# Patient Record
Sex: MALE | Race: WHITE | Employment: FULL TIME | URBAN - METROPOLITAN AREA
[De-identification: names, ages, dates, MRNs, and addresses within clinical notes are randomized per-mention and may not be internally consistent; named-entity substitution may affect disease eponyms.]

---

## 2017-12-01 ENCOUNTER — HOSPITAL ENCOUNTER (EMERGENCY)
Facility: HOSPITAL | Age: 52
Discharge: HOME | End: 2017-12-01
Attending: EMERGENCY MEDICINE
Payer: COMMERCIAL

## 2017-12-01 VITALS
HEART RATE: 89 BPM | WEIGHT: 231 LBS | RESPIRATION RATE: 20 BRPM | SYSTOLIC BLOOD PRESSURE: 175 MMHG | BODY MASS INDEX: 32.34 KG/M2 | TEMPERATURE: 98 F | OXYGEN SATURATION: 97 % | HEIGHT: 71 IN | DIASTOLIC BLOOD PRESSURE: 111 MMHG

## 2017-12-01 DIAGNOSIS — R43.8 METALLIC TASTE: Primary | ICD-10-CM

## 2017-12-01 PROCEDURE — 99282 EMERGENCY DEPT VISIT SF MDM: CPT

## 2017-12-01 RX ORDER — METHYLPREDNISOLONE 4 MG/1
TABLET ORAL AS DIRECTED
COMMUNITY
End: 2018-12-19

## 2017-12-01 RX ORDER — HYDROCODONE BITARTRATE AND ACETAMINOPHEN 5; 325 MG/1; MG/1
1 TABLET ORAL EVERY 6 HOURS PRN
COMMUNITY
End: 2018-12-19 | Stop reason: ALTCHOICE

## 2017-12-02 NOTE — ED PROVIDER NOTES
Patient Seen in: [facility] AND [facility] Emergency Department    History   Patient presents with:   Allergic Rxn Allergies (immune)    Stated Complaint: possible allergic reaction to steroid- c/o tongue and lip numbness     HPI    47 yo M with PMH HL, cervical st numbness  Other systems are as noted in HPI. Constitutional and vital signs reviewed. All other systems reviewed and negative except as noted above. PSFH elements reviewed from today and agreed except as otherwise stated in HPI.     Physical Exam

## 2017-12-02 NOTE — ED INITIAL ASSESSMENT (HPI)
Pt to ER with c/o numbness to tongue and lips that started prior to arrival. Pt started on Medrol dose pack and [employer] recently for cervical spine discomfort. No respiratory distress. 97% on room air. Lungs clear bilaterally.  Pt able to speak in full senten

## 2018-12-19 ENCOUNTER — TELEPHONE (OUTPATIENT)
Dept: INTERNAL MEDICINE CLINIC | Facility: CLINIC | Age: 53
End: 2018-12-19

## 2018-12-19 ENCOUNTER — LAB ENCOUNTER (OUTPATIENT)
Dept: LAB | Age: 53
End: 2018-12-19
Attending: INTERNAL MEDICINE
Payer: COMMERCIAL

## 2018-12-19 ENCOUNTER — OFFICE VISIT (OUTPATIENT)
Dept: INTERNAL MEDICINE CLINIC | Facility: CLINIC | Age: 53
End: 2018-12-19
Payer: COMMERCIAL

## 2018-12-19 VITALS
DIASTOLIC BLOOD PRESSURE: 84 MMHG | TEMPERATURE: 99 F | SYSTOLIC BLOOD PRESSURE: 126 MMHG | BODY MASS INDEX: 32.2 KG/M2 | HEIGHT: 71 IN | WEIGHT: 230 LBS | HEART RATE: 80 BPM

## 2018-12-19 DIAGNOSIS — D11.0: ICD-10-CM

## 2018-12-19 DIAGNOSIS — Z00.00 ROUTINE GENERAL MEDICAL EXAMINATION AT A HEALTH CARE FACILITY: Primary | ICD-10-CM

## 2018-12-19 DIAGNOSIS — R53.83 FATIGUE: ICD-10-CM

## 2018-12-19 DIAGNOSIS — E78.5 HYPERLIPEMIA: ICD-10-CM

## 2018-12-19 DIAGNOSIS — Z12.5 SCREENING FOR PROSTATE CANCER: ICD-10-CM

## 2018-12-19 DIAGNOSIS — R53.83 FATIGUE, UNSPECIFIED TYPE: ICD-10-CM

## 2018-12-19 DIAGNOSIS — M54.12 CERVICAL RADICULOPATHY: ICD-10-CM

## 2018-12-19 DIAGNOSIS — Z00.00 ANNUAL PHYSICAL EXAM: Primary | ICD-10-CM

## 2018-12-19 DIAGNOSIS — E78.5 DYSLIPIDEMIA: ICD-10-CM

## 2018-12-19 DIAGNOSIS — Z00.00 ANNUAL PHYSICAL EXAM: ICD-10-CM

## 2018-12-19 DIAGNOSIS — N52.9 ERECTILE DYSFUNCTION, UNSPECIFIED ERECTILE DYSFUNCTION TYPE: ICD-10-CM

## 2018-12-19 DIAGNOSIS — Z82.49 FAMILY HISTORY OF EARLY CAD: ICD-10-CM

## 2018-12-19 PROCEDURE — 80053 COMPREHEN METABOLIC PANEL: CPT

## 2018-12-19 PROCEDURE — 99386 PREV VISIT NEW AGE 40-64: CPT | Performed by: INTERNAL MEDICINE

## 2018-12-19 PROCEDURE — 85025 COMPLETE CBC W/AUTO DIFF WBC: CPT

## 2018-12-19 PROCEDURE — 80061 LIPID PANEL: CPT

## 2018-12-19 PROCEDURE — 36415 COLL VENOUS BLD VENIPUNCTURE: CPT

## 2018-12-19 RX ORDER — ATORVASTATIN CALCIUM 40 MG/1
TABLET, FILM COATED ORAL
COMMUNITY
Start: 2018-12-10 | End: 2020-05-14

## 2018-12-19 RX ORDER — SILDENAFIL 25 MG/1
25 TABLET, FILM COATED ORAL
Qty: 10 TABLET | Refills: 3 | Status: SHIPPED | OUTPATIENT
Start: 2018-12-19 | End: 2021-09-03

## 2018-12-19 NOTE — TELEPHONE ENCOUNTER
Please notify patient that I reviewed his records;    I would recommend that he do the heart scan in February. Mary Gold his MRI results of his cervical spine, I would recommend seeing a spine surgeon for evaluation as he still has L arm weakness.  I wo

## 2018-12-19 NOTE — PROGRESS NOTES
Maycol Whipple is a 48year old male. Patient presents with:  Establish Care: Previous patient of Dr. Soraya Blum seeking PCP closer to home. Hx of High Cholesterol. Last routine labs 1+ years ago, pt has been fasting this morning.  Pts last COLON May '1 SYSTEMS:   GENERAL: feels well otherwise  SKIN: denies any unusual skin lesions  HEENT: denies nasal congestion, sinus pain, ST, sore throat, ear pain  LUNGS: denies shortness of breath with exertion, wheezing, cough, or sputum production  CARDIOVASCULAR:

## 2018-12-19 NOTE — TELEPHONE ENCOUNTER
Elias chavarria \"[facility]\"  was Full and CANNOT accept messages. Attempted to reach pt. Clinical Staff to f/up 12/20.     PLEASE SEE MD MSG BELOW ALONG WITH DR Dumont Pretty CONTACT INFO    Geno James MD   Neurological Surgery, Spine Surgery   Neurological Surger

## 2018-12-20 ENCOUNTER — TELEPHONE (OUTPATIENT)
Dept: INTERNAL MEDICINE CLINIC | Facility: CLINIC | Age: 53
End: 2018-12-20

## 2018-12-26 NOTE — TELEPHONE ENCOUNTER
Patient called back - relayed DR. GAONA message and gave DR. Castano information - verbalized understanding

## 2020-02-28 ENCOUNTER — APPOINTMENT (OUTPATIENT)
Dept: LAB | Age: 55
End: 2020-02-28
Attending: INTERNAL MEDICINE
Payer: COMMERCIAL

## 2020-02-28 ENCOUNTER — OFFICE VISIT (OUTPATIENT)
Dept: INTERNAL MEDICINE CLINIC | Facility: CLINIC | Age: 55
End: 2020-02-28
Payer: COMMERCIAL

## 2020-02-28 VITALS
WEIGHT: 242 LBS | HEIGHT: 71.1 IN | OXYGEN SATURATION: 99 % | TEMPERATURE: 99 F | BODY MASS INDEX: 33.51 KG/M2 | DIASTOLIC BLOOD PRESSURE: 92 MMHG | SYSTOLIC BLOOD PRESSURE: 140 MMHG | HEART RATE: 71 BPM

## 2020-02-28 DIAGNOSIS — Z00.00 ANNUAL PHYSICAL EXAM: Primary | ICD-10-CM

## 2020-02-28 DIAGNOSIS — D11.0: ICD-10-CM

## 2020-02-28 DIAGNOSIS — Z00.00 ANNUAL PHYSICAL EXAM: ICD-10-CM

## 2020-02-28 DIAGNOSIS — M54.12 CERVICAL RADICULOPATHY: ICD-10-CM

## 2020-02-28 DIAGNOSIS — R03.0 ELEVATED BLOOD PRESSURE READING: ICD-10-CM

## 2020-02-28 DIAGNOSIS — Z82.49 FAMILY HISTORY OF EARLY CAD: ICD-10-CM

## 2020-02-28 DIAGNOSIS — E78.5 DYSLIPIDEMIA: ICD-10-CM

## 2020-02-28 LAB
ALBUMIN SERPL-MCNC: 4.1 G/DL (ref 3.4–5)
ALBUMIN/GLOB SERPL: 1.2 {RATIO} (ref 1–2)
ALP LIVER SERPL-CCNC: 98 U/L (ref 45–117)
ALT SERPL-CCNC: 68 U/L (ref 16–61)
ANION GAP SERPL CALC-SCNC: 5 MMOL/L (ref 0–18)
AST SERPL-CCNC: 21 U/L (ref 15–37)
BASOPHILS # BLD AUTO: 0.06 X10(3) UL (ref 0–0.2)
BASOPHILS NFR BLD AUTO: 0.9 %
BILIRUB SERPL-MCNC: 1.1 MG/DL (ref 0.1–2)
BUN BLD-MCNC: 14 MG/DL (ref 7–18)
BUN/CREAT SERPL: 13.1 (ref 10–20)
CALCIUM BLD-MCNC: 9.4 MG/DL (ref 8.5–10.1)
CHLORIDE SERPL-SCNC: 108 MMOL/L (ref 98–112)
CHOLEST SMN-MCNC: 192 MG/DL (ref ?–200)
CO2 SERPL-SCNC: 27 MMOL/L (ref 21–32)
COMPLEXED PSA SERPL-MCNC: 1.51 NG/ML (ref ?–4)
CREAT BLD-MCNC: 1.07 MG/DL (ref 0.7–1.3)
DEPRECATED RDW RBC AUTO: 40.9 FL (ref 35.1–46.3)
EOSINOPHIL # BLD AUTO: 0.22 X10(3) UL (ref 0–0.7)
EOSINOPHIL NFR BLD AUTO: 3.2 %
ERYTHROCYTE [DISTWIDTH] IN BLOOD BY AUTOMATED COUNT: 12.5 % (ref 11–15)
GLOBULIN PLAS-MCNC: 3.3 G/DL (ref 2.8–4.4)
GLUCOSE BLD-MCNC: 99 MG/DL (ref 70–99)
HCT VFR BLD AUTO: 45.6 % (ref 39–53)
HDLC SERPL-MCNC: 63 MG/DL (ref 40–59)
HGB BLD-MCNC: 16 G/DL (ref 13–17.5)
IMM GRANULOCYTES # BLD AUTO: 0.04 X10(3) UL (ref 0–1)
IMM GRANULOCYTES NFR BLD: 0.6 %
LDLC SERPL CALC-MCNC: 115 MG/DL (ref ?–100)
LYMPHOCYTES # BLD AUTO: 2.31 X10(3) UL (ref 1–4)
LYMPHOCYTES NFR BLD AUTO: 33.4 %
M PROTEIN MFR SERPL ELPH: 7.4 G/DL (ref 6.4–8.2)
MCH RBC QN AUTO: 31.8 PG (ref 26–34)
MCHC RBC AUTO-ENTMCNC: 35.1 G/DL (ref 31–37)
MCV RBC AUTO: 90.7 FL (ref 80–100)
MONOCYTES # BLD AUTO: 0.68 X10(3) UL (ref 0.1–1)
MONOCYTES NFR BLD AUTO: 9.8 %
NEUTROPHILS # BLD AUTO: 3.61 X10 (3) UL (ref 1.5–7.7)
NEUTROPHILS # BLD AUTO: 3.61 X10(3) UL (ref 1.5–7.7)
NEUTROPHILS NFR BLD AUTO: 52.1 %
NONHDLC SERPL-MCNC: 129 MG/DL (ref ?–130)
OSMOLALITY SERPL CALC.SUM OF ELEC: 291 MOSM/KG (ref 275–295)
PATIENT FASTING Y/N/NP: YES
PATIENT FASTING Y/N/NP: YES
PLATELET # BLD AUTO: 228 10(3)UL (ref 150–450)
POTASSIUM SERPL-SCNC: 4.5 MMOL/L (ref 3.5–5.1)
RBC # BLD AUTO: 5.03 X10(6)UL (ref 4.3–5.7)
SODIUM SERPL-SCNC: 140 MMOL/L (ref 136–145)
TRIGL SERPL-MCNC: 70 MG/DL (ref 30–149)
VLDLC SERPL CALC-MCNC: 14 MG/DL (ref 0–30)
WBC # BLD AUTO: 6.9 X10(3) UL (ref 4–11)

## 2020-02-28 PROCEDURE — 80053 COMPREHEN METABOLIC PANEL: CPT | Performed by: INTERNAL MEDICINE

## 2020-02-28 PROCEDURE — 99396 PREV VISIT EST AGE 40-64: CPT | Performed by: INTERNAL MEDICINE

## 2020-02-28 PROCEDURE — 36415 COLL VENOUS BLD VENIPUNCTURE: CPT | Performed by: INTERNAL MEDICINE

## 2020-02-28 PROCEDURE — 80061 LIPID PANEL: CPT | Performed by: INTERNAL MEDICINE

## 2020-02-28 PROCEDURE — 85025 COMPLETE CBC W/AUTO DIFF WBC: CPT | Performed by: INTERNAL MEDICINE

## 2020-02-28 NOTE — PROGRESS NOTES
Joshua Flores is a 47year old male. Patient presents with:  Physical: Pt here for annual physical exam. Last colonoscopy done in 2016. Cough: Pt c/o productive cough that began 2 weeks ago. Recent travel to [place], [place] for work.  Taking OTC Mucinex, has History   Problem Relation Age of Onset   • Cancer Father         bladder, prostate   • Heart Disorder Father         MI at age 36   • Cancer Mother 80        lung, smoker   • Other (hypothyroidism) Mother    • Cancer Sister    • Other (Rheumatoid Arthriti patient asymptomatic today. 7. Elevated blood pressure reading  Advised to check at home for the next 2 weeks, then send me [employer] message with the readings.        Patria Whitmore, DO

## 2020-03-02 ENCOUNTER — TELEPHONE (OUTPATIENT)
Dept: INTERNAL MEDICINE CLINIC | Facility: CLINIC | Age: 55
End: 2020-03-02

## 2020-03-02 ENCOUNTER — PATIENT MESSAGE (OUTPATIENT)
Dept: INTERNAL MEDICINE CLINIC | Facility: CLINIC | Age: 55
End: 2020-03-02

## 2020-03-02 DIAGNOSIS — R74.01 ELEVATED TRANSAMINASE LEVEL: Primary | ICD-10-CM

## 2020-03-02 NOTE — TELEPHONE ENCOUNTER
From: Karolina Dao  Sent: 3/2/2020 2:41 PM CST  To: Em [facility] Clinical Staff  Subject: RE:results    Hi Dr. Reddy Balbuena,     Thanks for the response.  In regard to my liver test I did have a few cocktails the evening before the test. For the new blood test carlin

## 2020-03-05 ENCOUNTER — APPOINTMENT (OUTPATIENT)
Dept: LAB | Age: 55
End: 2020-03-05
Attending: INTERNAL MEDICINE
Payer: COMMERCIAL

## 2020-03-05 ENCOUNTER — PATIENT MESSAGE (OUTPATIENT)
Dept: INTERNAL MEDICINE CLINIC | Facility: CLINIC | Age: 55
End: 2020-03-05

## 2020-03-05 DIAGNOSIS — R74.01 ELEVATED TRANSAMINASE LEVEL: ICD-10-CM

## 2020-03-05 LAB
ALBUMIN SERPL-MCNC: 4.1 G/DL (ref 3.4–5)
ALP LIVER SERPL-CCNC: 83 U/L (ref 45–117)
ALT SERPL-CCNC: 57 U/L (ref 16–61)
AST SERPL-CCNC: 18 U/L (ref 15–37)
BILIRUB DIRECT SERPL-MCNC: 0.3 MG/DL (ref 0–0.2)
BILIRUB SERPL-MCNC: 1.2 MG/DL (ref 0.1–2)
M PROTEIN MFR SERPL ELPH: 7.2 G/DL (ref 6.4–8.2)

## 2020-03-05 PROCEDURE — 36415 COLL VENOUS BLD VENIPUNCTURE: CPT

## 2020-03-05 PROCEDURE — 80076 HEPATIC FUNCTION PANEL: CPT

## 2020-03-05 NOTE — TELEPHONE ENCOUNTER
From: Paul Blas  Sent: 3/5/2020 5:13 PM CST  To: [facility] Clinical Staff  Subject: RE:results    Will do.  Thanks  ----- Message -----  From: Chuy Garcia DO  Sent: 3/5/2020 5:05 PM CST  To: Yasmine Shah  Subject: results  Valerie Marcial,  Your liver annette

## 2020-03-20 RX ORDER — AMLODIPINE BESYLATE 2.5 MG/1
2.5 TABLET ORAL DAILY
Qty: 30 TABLET | Refills: 1 | Status: SHIPPED | OUTPATIENT
Start: 2020-03-20 | End: 2020-05-14

## 2020-05-11 NOTE — TELEPHONE ENCOUNTER
Please ask pt for recent blood pressure readings; need this in order to determine whether to refill same dose or if we need to increase

## 2020-05-14 RX ORDER — AMLODIPINE BESYLATE 2.5 MG/1
TABLET ORAL
Qty: 90 TABLET | Refills: 3 | Status: SHIPPED | OUTPATIENT
Start: 2020-05-14 | End: 2021-05-13

## 2020-05-14 RX ORDER — ATORVASTATIN CALCIUM 40 MG/1
40 TABLET, FILM COATED ORAL DAILY
Qty: 90 TABLET | Refills: 3 | Status: SHIPPED | OUTPATIENT
Start: 2020-05-14 | End: 2021-05-24

## 2020-05-14 NOTE — TELEPHONE ENCOUNTER
Patient reports SBP ranges from 127-133; DBP 74-83. He's been taking his blood pressure periodically.      Patient also request refill for Atorvastatin; last filled by former PCP, RX pended    To Dr. Valerie Carson

## 2020-05-14 NOTE — TELEPHONE ENCOUNTER
Left message to call back  Left detailed message asking patient to please call back with BP readings so correct dose for amlodipine can be refilled

## 2021-05-12 NOTE — TELEPHONE ENCOUNTER
Patient last saw Ignacio Collins on 2/28/2020. Needs appointment for annual PE.  To EMA  to please call patient and assist with scheduling then back to Rx group for refill

## 2021-05-13 RX ORDER — AMLODIPINE BESYLATE 2.5 MG/1
TABLET ORAL
Qty: 30 TABLET | Refills: 0 | Status: SHIPPED | OUTPATIENT
Start: 2021-05-13 | End: 2021-05-28

## 2021-05-13 NOTE — TELEPHONE ENCOUNTER
Patient was called per request below. Patient was given Dr Rodger Gonzalez next availably (July 1, 2021) but patient did not want to wait that long to come into the office. Patient scheduled an annual physical with Dr Oxana Mackey on Friday, 5/28/2021 at 0900.     Routed

## 2021-05-23 NOTE — H&P
Isaac Benjamin is a 54year old male. HPI:   Patient presents with:  Physical: Annual Px    Mr. Ruth Joy is a 54year old male coming in for annual physical examination. Has been having issues with his hip, ongoing for 4-5 years. Intermittent.  May b Surgical History:   Procedure Laterality Date   • OTHER SURGICAL HISTORY  02/10/2018    tumor removal carotid      Family History   Problem Relation Age of Onset   • Cancer Father         bladder, prostate   • Heart Disorder Father         MI at age 36   • Pain  Musculoskeletal: Arthralgias, Myalgias, Joint Swelling, Joint Stiffness, Back Pain, Neck Pain, Joint pain radiating groin pain  Integumentary: Skin Lesions, Pruritis, Hair Changes, Jaundice, Nail changes  Neuro: Weakness, Numbness, Paresthesias, Loss 61  –PSA 1.51  –CBC within normal limits    3/5/2020  –LFTs within normal limits    Imaging:  MRI cervical spine 12/28/2017  FINDINGS:   The cranio-cervical junction is unremarkable. There is mild grade 1 anterolisthesis of C3 on C4.  There is trace   r soft tissue neck with contrast would be helpful   for further assessment. Multilevel spondylosis is most advanced at C3-4, C5-6 and C6-7.  No   abnormal cord signal.     Pathology:  Surgical pathology 2/13/2018 -care everywhere  FINAL DIAGNOSIS   RIGHT discussed nutrition counseling including portion control, opting for high-fiber foods such as fruits and vegetables  -He is motivated to lower the weight over time, this can take weeks to months and patient is aware that the the goal is gradual weight loss mcg/0.5 ml 04/04/2021, 05/02/2021   • FLUZONE 6 months and older PFS 0.5 ml (33679) 09/26/2018, 10/26/2019   • Flucelvax 0.5ml Vaccine 10/04/2017   • Fluvirin, 3 Years & >, Im 12/11/2012, 12/18/2013, 10/05/2017   • Influenza 01/05/2013, 10/23/2019   • TDAP

## 2021-05-23 NOTE — PATIENT INSTRUCTIONS
- You were seen in clinic for regular annual check-up.  We have ordered labs for you and we will call you with the results.  -Check your blood pressures at home, and let us know if they are persistently elevated  -For your hip pain, we should obtain an x-ra

## 2021-05-24 RX ORDER — ATORVASTATIN CALCIUM 40 MG/1
TABLET, FILM COATED ORAL
Qty: 30 TABLET | Refills: 0 | Status: SHIPPED | OUTPATIENT
Start: 2021-05-24 | End: 2021-06-18

## 2021-05-28 ENCOUNTER — LAB ENCOUNTER (OUTPATIENT)
Dept: LAB | Age: 56
End: 2021-05-28
Attending: INTERNAL MEDICINE
Payer: COMMERCIAL

## 2021-05-28 ENCOUNTER — OFFICE VISIT (OUTPATIENT)
Dept: INTERNAL MEDICINE CLINIC | Facility: CLINIC | Age: 56
End: 2021-05-28
Payer: COMMERCIAL

## 2021-05-28 ENCOUNTER — TELEPHONE (OUTPATIENT)
Dept: INTERNAL MEDICINE CLINIC | Facility: CLINIC | Age: 56
End: 2021-05-28

## 2021-05-28 VITALS
DIASTOLIC BLOOD PRESSURE: 86 MMHG | BODY MASS INDEX: 34.41 KG/M2 | WEIGHT: 245.81 LBS | OXYGEN SATURATION: 98 % | HEART RATE: 81 BPM | HEIGHT: 71 IN | TEMPERATURE: 98 F | SYSTOLIC BLOOD PRESSURE: 132 MMHG

## 2021-05-28 DIAGNOSIS — Z00.00 ANNUAL PHYSICAL EXAM: ICD-10-CM

## 2021-05-28 DIAGNOSIS — M54.12 CERVICAL RADICULOPATHY: ICD-10-CM

## 2021-05-28 DIAGNOSIS — M25.559 HIP PAIN: ICD-10-CM

## 2021-05-28 DIAGNOSIS — Z13.220 SCREENING FOR LIPOID DISORDERS: ICD-10-CM

## 2021-05-28 DIAGNOSIS — Z00.00 ROUTINE GENERAL MEDICAL EXAMINATION AT A HEALTH CARE FACILITY: Primary | ICD-10-CM

## 2021-05-28 DIAGNOSIS — R03.0 ELEVATED BLOOD PRESSURE READING: ICD-10-CM

## 2021-05-28 DIAGNOSIS — D11.0 BENIGN TUMOR OF PAROTID GLAND: ICD-10-CM

## 2021-05-28 DIAGNOSIS — Z13.0 SCREENING FOR IRON DEFICIENCY ANEMIA: ICD-10-CM

## 2021-05-28 DIAGNOSIS — Z12.5 SPECIAL SCREENING FOR MALIGNANT NEOPLASM OF PROSTATE: ICD-10-CM

## 2021-05-28 DIAGNOSIS — E78.5 DYSLIPIDEMIA: ICD-10-CM

## 2021-05-28 DIAGNOSIS — Z13.29 SCREENING FOR THYROID DISORDER: ICD-10-CM

## 2021-05-28 DIAGNOSIS — D11.0: ICD-10-CM

## 2021-05-28 DIAGNOSIS — Z12.11 SPECIAL SCREENING FOR MALIGNANT NEOPLASMS, COLON: ICD-10-CM

## 2021-05-28 DIAGNOSIS — Z82.49 FAMILY HISTORY OF HEART DISEASE: ICD-10-CM

## 2021-05-28 DIAGNOSIS — R10.31 RIGHT INGUINAL PAIN: ICD-10-CM

## 2021-05-28 DIAGNOSIS — E03.8 SUBCLINICAL HYPOTHYROIDISM: Primary | ICD-10-CM

## 2021-05-28 PROCEDURE — 85025 COMPLETE CBC W/AUTO DIFF WBC: CPT | Performed by: INTERNAL MEDICINE

## 2021-05-28 PROCEDURE — 36415 COLL VENOUS BLD VENIPUNCTURE: CPT | Performed by: INTERNAL MEDICINE

## 2021-05-28 PROCEDURE — 80053 COMPREHEN METABOLIC PANEL: CPT | Performed by: INTERNAL MEDICINE

## 2021-05-28 PROCEDURE — 3075F SYST BP GE 130 - 139MM HG: CPT | Performed by: INTERNAL MEDICINE

## 2021-05-28 PROCEDURE — 3008F BODY MASS INDEX DOCD: CPT | Performed by: INTERNAL MEDICINE

## 2021-05-28 PROCEDURE — 3079F DIAST BP 80-89 MM HG: CPT | Performed by: INTERNAL MEDICINE

## 2021-05-28 PROCEDURE — 84443 ASSAY THYROID STIM HORMONE: CPT | Performed by: INTERNAL MEDICINE

## 2021-05-28 PROCEDURE — 99396 PREV VISIT EST AGE 40-64: CPT | Performed by: INTERNAL MEDICINE

## 2021-05-28 PROCEDURE — 84439 ASSAY OF FREE THYROXINE: CPT | Performed by: INTERNAL MEDICINE

## 2021-05-28 PROCEDURE — 80061 LIPID PANEL: CPT | Performed by: INTERNAL MEDICINE

## 2021-05-28 PROCEDURE — 81003 URINALYSIS AUTO W/O SCOPE: CPT | Performed by: INTERNAL MEDICINE

## 2021-05-28 RX ORDER — AMLODIPINE BESYLATE 2.5 MG/1
2.5 TABLET ORAL DAILY
Qty: 90 TABLET | Refills: 3 | Status: SHIPPED | OUTPATIENT
Start: 2021-05-28 | End: 2021-10-01

## 2021-05-28 RX ORDER — AMLODIPINE BESYLATE 2.5 MG/1
2.5 TABLET ORAL DAILY
Qty: 30 TABLET | Refills: 0 | Status: SHIPPED | OUTPATIENT
Start: 2021-05-28 | End: 2021-05-28

## 2021-05-28 NOTE — TELEPHONE ENCOUNTER
Reviewed the letter from 5/28/2021:    Liver, kidney, electrolytes, blood counts all within normal    Lipid panel: HDL 65,  - ASCVD 4.9% on high dose statin    PSA normal    TFTs: Subclinical hypothyroidism TSH 4.930, free T4 normal    Recommendatio

## 2021-06-01 NOTE — TELEPHONE ENCOUNTER
I called the patient with the results as below. I left a voicemail, placed an order for TFTs to repeat in 6 to 12 months.   Letter know symptoms to monitor for concerning of ongoing low thyroid levels such as this generalized slowing down the body, leg s

## 2021-06-18 RX ORDER — ATORVASTATIN CALCIUM 40 MG/1
40 TABLET, FILM COATED ORAL DAILY
Qty: 90 TABLET | Refills: 3 | Status: SHIPPED | OUTPATIENT
Start: 2021-06-18 | End: 2022-01-19

## 2021-07-26 ENCOUNTER — HOSPITAL ENCOUNTER (OUTPATIENT)
Dept: GENERAL RADIOLOGY | Age: 56
Discharge: HOME | End: 2021-07-26
Attending: INTERNAL MEDICINE
Payer: COMMERCIAL

## 2021-07-26 DIAGNOSIS — M25.559 HIP PAIN: ICD-10-CM

## 2021-07-26 PROCEDURE — 73502 X-RAY EXAM HIP UNI 2-3 VIEWS: CPT | Performed by: INTERNAL MEDICINE

## 2021-07-27 ENCOUNTER — TELEPHONE (OUTPATIENT)
Dept: INTERNAL MEDICINE CLINIC | Facility: CLINIC | Age: 56
End: 2021-07-27

## 2021-07-27 DIAGNOSIS — M25.551 BILATERAL HIP PAIN: Primary | ICD-10-CM

## 2021-07-27 DIAGNOSIS — M25.552 BILATERAL HIP PAIN: Primary | ICD-10-CM

## 2021-07-27 NOTE — TELEPHONE ENCOUNTER
I reviewed the X-ray from 7/26:    There is mild narrowing with minimal subchondral sclerosis and soteophytic spurring, minimal narrowing of Superior L hip joint space with minimal osteophytic spurring. R > L Mild DJD of b/l hips    Condition update:   B

## 2021-07-30 ENCOUNTER — HOSPITAL ENCOUNTER (OUTPATIENT)
Dept: CT IMAGING | Age: 56
Discharge: HOME | End: 2021-07-30
Attending: INTERNAL MEDICINE

## 2021-07-30 DIAGNOSIS — E78.5 DYSLIPIDEMIA: ICD-10-CM

## 2021-07-30 DIAGNOSIS — Z82.49 FAMILY HISTORY OF HEART DISEASE: ICD-10-CM

## 2021-08-02 ENCOUNTER — TELEPHONE (OUTPATIENT)
Dept: INTERNAL MEDICINE CLINIC | Facility: CLINIC | Age: 56
End: 2021-08-02

## 2021-08-02 DIAGNOSIS — I25.10 CORONARY ARTERY DISEASE INVOLVING NATIVE CORONARY ARTERY OF NATIVE HEART WITHOUT ANGINA PECTORIS: Primary | ICD-10-CM

## 2021-08-02 DIAGNOSIS — M25.552 CHRONIC PAIN OF BOTH HIPS: ICD-10-CM

## 2021-08-02 DIAGNOSIS — G89.29 CHRONIC PAIN OF BOTH HIPS: ICD-10-CM

## 2021-08-02 DIAGNOSIS — M25.551 CHRONIC PAIN OF BOTH HIPS: ICD-10-CM

## 2021-08-02 NOTE — TELEPHONE ENCOUNTER
Patient noted. Coronary artery calcium score is 504. Mostly in the LAD. According to 's last note May 2021 patient asymptomatic. Okay to wait for Dr. Tawanna Shepherd. [place], [id number] [address].  Jerri Cooper

## 2021-08-05 NOTE — TELEPHONE ENCOUNTER
Reviewed CT calcium score 7/30/2021:    Overread: Dense coronary artery calcifications. No lymphadenopathy. Mild bibasilar atelectasis or scarring.     CT calcium score: 504, notable LAD for 79 with high likelihood of at least 1 significant coronary narro

## 2021-08-24 ENCOUNTER — PATIENT MESSAGE (OUTPATIENT)
Dept: INTERNAL MEDICINE CLINIC | Facility: CLINIC | Age: 56
End: 2021-08-24

## 2021-08-24 NOTE — TELEPHONE ENCOUNTER
From: Linda Shah  To: Lisha Kaba MD  Sent: 8/24/2021 11:54 AM CDT  Subject: Test Results Question    Hi Dr. Tawanna Shepherd,     As I was reviewing information from my test results the following comments about my lungs jumped out at me.  Will you please exp

## 2021-09-03 ENCOUNTER — OFFICE VISIT (OUTPATIENT)
Dept: CARDIOLOGY CLINIC | Facility: CLINIC | Age: 56
End: 2021-09-03
Payer: COMMERCIAL

## 2021-09-03 VITALS
OXYGEN SATURATION: 97 % | HEART RATE: 81 BPM | SYSTOLIC BLOOD PRESSURE: 138 MMHG | RESPIRATION RATE: 17 BRPM | WEIGHT: 251.19 LBS | HEIGHT: 71 IN | DIASTOLIC BLOOD PRESSURE: 88 MMHG | BODY MASS INDEX: 35.17 KG/M2

## 2021-09-03 DIAGNOSIS — I10 HTN (HYPERTENSION), BENIGN: Primary | ICD-10-CM

## 2021-09-03 DIAGNOSIS — R93.1 AGATSTON CORONARY ARTERY CALCIUM SCORE GREATER THAN 400: ICD-10-CM

## 2021-09-03 DIAGNOSIS — E78.5 HYPERLIPIDEMIA LDL GOAL <100: ICD-10-CM

## 2021-09-03 PROCEDURE — 99204 OFFICE O/P NEW MOD 45 MIN: CPT | Performed by: INTERNAL MEDICINE

## 2021-09-03 PROCEDURE — 3008F BODY MASS INDEX DOCD: CPT | Performed by: INTERNAL MEDICINE

## 2021-09-03 PROCEDURE — 3075F SYST BP GE 130 - 139MM HG: CPT | Performed by: INTERNAL MEDICINE

## 2021-09-03 PROCEDURE — 3079F DIAST BP 80-89 MM HG: CPT | Performed by: INTERNAL MEDICINE

## 2021-09-03 PROCEDURE — 93000 ELECTROCARDIOGRAM COMPLETE: CPT | Performed by: INTERNAL MEDICINE

## 2021-09-03 NOTE — PATIENT INSTRUCTIONS
Continue current medications and start taking aspirin 81 mg once a day. Proceed with nuclear stress test as ordered. Follow-up with me or APN in 3 to 4 weeks to go over the results.

## 2021-09-03 NOTE — PROGRESS NOTES
Estelle Kimbrough is a 64year old male. Patient presents with:  Consult: Coronary artery disease involving native coronary artery o native heart without angina pectoris. HPI:   Patient is here for new patient appointment.   He denies any cardiac history b auscultation  CARDIO: regular rate and rhythm  GI: good BS's,no masses, HSM or tenderness  EXTREMITIES: no cyanosis, clubbing or edema    Assessment   ASSESSMENT AND PLAN:     Problem List Items Addressed This Visit     Hyperlipidemia LDL goal <100    Rele

## 2021-09-15 ENCOUNTER — HOSPITAL ENCOUNTER (OUTPATIENT)
Dept: NUCLEAR MEDICINE | Facility: HOSPITAL | Age: 56
Discharge: HOME | End: 2021-09-15
Attending: INTERNAL MEDICINE
Payer: COMMERCIAL

## 2021-09-15 ENCOUNTER — HOSPITAL ENCOUNTER (OUTPATIENT)
Dept: CV DIAGNOSTICS | Facility: HOSPITAL | Age: 56
Discharge: HOME | End: 2021-09-15
Attending: INTERNAL MEDICINE
Payer: COMMERCIAL

## 2021-09-15 DIAGNOSIS — R93.1 AGATSTON CORONARY ARTERY CALCIUM SCORE GREATER THAN 400: ICD-10-CM

## 2021-09-15 DIAGNOSIS — E78.5 HYPERLIPIDEMIA LDL GOAL <100: ICD-10-CM

## 2021-09-15 DIAGNOSIS — I10 HTN (HYPERTENSION), BENIGN: ICD-10-CM

## 2021-09-15 PROCEDURE — 78452 HT MUSCLE IMAGE SPECT MULT: CPT | Performed by: INTERNAL MEDICINE

## 2021-09-15 PROCEDURE — 93017 CV STRESS TEST TRACING ONLY: CPT | Performed by: INTERNAL MEDICINE

## 2021-09-15 PROCEDURE — 93016 CV STRESS TEST SUPVJ ONLY: CPT | Performed by: INTERNAL MEDICINE

## 2021-09-15 PROCEDURE — 93018 CV STRESS TEST I&R ONLY: CPT | Performed by: INTERNAL MEDICINE

## 2021-10-01 ENCOUNTER — OFFICE VISIT (OUTPATIENT)
Dept: CARDIOLOGY CLINIC | Facility: CLINIC | Age: 56
End: 2021-10-01
Payer: COMMERCIAL

## 2021-10-01 VITALS
WEIGHT: 249 LBS | HEIGHT: 71 IN | HEART RATE: 83 BPM | OXYGEN SATURATION: 97 % | DIASTOLIC BLOOD PRESSURE: 88 MMHG | BODY MASS INDEX: 34.86 KG/M2 | SYSTOLIC BLOOD PRESSURE: 141 MMHG | RESPIRATION RATE: 18 BRPM

## 2021-10-01 DIAGNOSIS — E78.5 HYPERLIPIDEMIA LDL GOAL <100: ICD-10-CM

## 2021-10-01 DIAGNOSIS — R93.1 AGATSTON CORONARY ARTERY CALCIUM SCORE GREATER THAN 400: ICD-10-CM

## 2021-10-01 DIAGNOSIS — I10 HTN (HYPERTENSION), BENIGN: Primary | ICD-10-CM

## 2021-10-01 PROCEDURE — 99214 OFFICE O/P EST MOD 30 MIN: CPT | Performed by: INTERNAL MEDICINE

## 2021-10-01 PROCEDURE — 3077F SYST BP >= 140 MM HG: CPT | Performed by: INTERNAL MEDICINE

## 2021-10-01 PROCEDURE — 3008F BODY MASS INDEX DOCD: CPT | Performed by: INTERNAL MEDICINE

## 2021-10-01 PROCEDURE — 3079F DIAST BP 80-89 MM HG: CPT | Performed by: INTERNAL MEDICINE

## 2021-10-01 RX ORDER — ASPIRIN 81 MG/1
81 TABLET ORAL DAILY
Qty: 30 TABLET | Refills: 11 | Status: SHIPPED | OUTPATIENT
Start: 2021-10-01

## 2021-10-01 RX ORDER — LOSARTAN POTASSIUM 25 MG/1
25 TABLET ORAL DAILY
Qty: 30 TABLET | Refills: 5 | Status: SHIPPED | OUTPATIENT
Start: 2021-10-01

## 2021-10-01 NOTE — PROGRESS NOTES
Isaac Benjamin is a 64year old male. Patient presents with: Follow - Up:  4 WEEKS    HPI:   Patient is here for follow-up appointment after recent stress test.  Had multiple risk factors and had elevated calcium score.   He underwent nuclear stress test Orders    LIPID PANEL    BASIC METABOLIC PANEL (8)    Agatston coronary artery calcium score greater than 400    HTN (hypertension), benign - Primary    Relevant Medications    Losartan Potassium 25 MG Oral Tab    Other Relevant Orders    LIPID PANEL    BA

## 2021-10-01 NOTE — PATIENT INSTRUCTIONS
Discontinue amlodipine and start losartan 25 mg once a day. Continue aspirin and atorvastatin. Blood test in 3 months and follow-up with me in 3 months or sooner if needed.

## 2021-10-22 ENCOUNTER — LAB ENCOUNTER (OUTPATIENT)
Dept: LAB | Age: 56
End: 2021-10-22
Attending: INTERNAL MEDICINE
Payer: COMMERCIAL

## 2021-10-22 DIAGNOSIS — E78.5 HYPERLIPIDEMIA LDL GOAL <100: ICD-10-CM

## 2021-10-22 DIAGNOSIS — I10 HTN (HYPERTENSION), BENIGN: ICD-10-CM

## 2021-10-22 PROCEDURE — 36415 COLL VENOUS BLD VENIPUNCTURE: CPT

## 2021-10-22 PROCEDURE — 80048 BASIC METABOLIC PNL TOTAL CA: CPT

## 2021-12-16 ENCOUNTER — TELEPHONE (OUTPATIENT)
Dept: INTERNAL MEDICINE CLINIC | Facility: CLINIC | Age: 56
End: 2021-12-16

## 2021-12-16 NOTE — TELEPHONE ENCOUNTER
Patient is calling with an exposure on Saturday. Patient was at a wedding on Saturday not wearing a mask. Patient had heard from a few people that they have tested positive. Patient has been quarantining for five days to be safe.  Patient is not feeling gre

## 2021-12-16 NOTE — TELEPHONE ENCOUNTER
Pt's wife called to report possible direct exposure to +COVID persons when pt was at a wedding Saturday 12/11/21- pt learned that 8 people tested +COVID after wedding.  Pt is fully vaccinated-discussed CDC guidelines regarding current existing quarantine gu

## 2021-12-22 ENCOUNTER — TELEPHONE (OUTPATIENT)
Dept: INTERNAL MEDICINE CLINIC | Facility: CLINIC | Age: 56
End: 2021-12-22

## 2021-12-22 NOTE — TELEPHONE ENCOUNTER
As FYI to DR. WHITE - called spouse per hipaa who states patient went for covid test - awaiting results - explained to wait for results , if high fever SOB , chest pain or oxygen below 90 % to go to ER , also if Covid test comes back negative would have to go t

## 2021-12-23 ENCOUNTER — HOSPITAL ENCOUNTER (OUTPATIENT)
Age: 56
Discharge: HOME | End: 2021-12-23
Payer: COMMERCIAL

## 2021-12-23 VITALS
OXYGEN SATURATION: 98 % | DIASTOLIC BLOOD PRESSURE: 80 MMHG | TEMPERATURE: 97 F | SYSTOLIC BLOOD PRESSURE: 138 MMHG | RESPIRATION RATE: 18 BRPM | HEART RATE: 80 BPM

## 2021-12-23 DIAGNOSIS — U07.1 COVID-19 VIRUS INFECTION: Primary | ICD-10-CM

## 2021-12-23 DIAGNOSIS — J02.0 ACUTE STREPTOCOCCAL PHARYNGITIS: ICD-10-CM

## 2021-12-23 PROCEDURE — 87880 STREP A ASSAY W/OPTIC: CPT

## 2021-12-23 PROCEDURE — 99213 OFFICE O/P EST LOW 20 MIN: CPT

## 2021-12-23 PROCEDURE — 99214 OFFICE O/P EST MOD 30 MIN: CPT

## 2021-12-23 RX ORDER — PENICILLIN V POTASSIUM 500 MG/1
500 TABLET ORAL 2 TIMES DAILY
Qty: 20 TABLET | Refills: 0 | Status: SHIPPED | OUTPATIENT
Start: 2021-12-23 | End: 2022-01-02

## 2021-12-23 RX ORDER — ALBUTEROL SULFATE 90 UG/1
2 AEROSOL, METERED RESPIRATORY (INHALATION) EVERY 4 HOURS PRN
Qty: 1 EACH | Refills: 0 | Status: SHIPPED | OUTPATIENT
Start: 2021-12-23 | End: 2022-01-22

## 2021-12-23 RX ORDER — BENZONATATE 100 MG/1
100 CAPSULE ORAL 3 TIMES DAILY PRN
Qty: 30 CAPSULE | Refills: 0 | Status: SHIPPED | OUTPATIENT
Start: 2021-12-23 | End: 2022-01-19

## 2021-12-23 RX ORDER — CODEINE PHOSPHATE AND GUAIFENESIN 10; 100 MG/5ML; MG/5ML
5 SOLUTION ORAL EVERY 6 HOURS PRN
Qty: 50 ML | Refills: 0 | Status: SHIPPED | OUTPATIENT
Start: 2021-12-23 | End: 2022-01-19 | Stop reason: ALTCHOICE

## 2021-12-23 NOTE — TELEPHONE ENCOUNTER
We need to await covid results---I would recommend taking tylenol 500mg every 6 hours as needed for fever; use cepacol lozenges to help with sore throat. Continue hydration as best as possible.    Keep me posted on the covid results and call if any worsenin

## 2021-12-23 NOTE — TELEPHONE ENCOUNTER
Discussed with Nichols Aurelia. Patient was seen in urgent care today. He had cough fever sore throat and chest congestion. I did review the notes from urgent care. He did turn positive for strep throat and Covid. Blood pressure was 138/80.   Respiratory rat

## 2021-12-23 NOTE — ED PROVIDER NOTES
Patient Seen in: [facility]    History   Patient presents with:  Cough/URI    Stated Complaint: Cough, fever, sore throat, chest congestion     HPI    Pilar Sun is a 64year old male presents with chief complaint of sore throat.   Onset Family History   Problem Relation Age of Onset   • Cancer Father         bladder, prostate   • Heart Disorder Father         MI at age 36   • Cancer Mother 80        lung, smoker   • Other (hypothyroidism) Mother    • Cancer Sister    • Other (Rheumatoid bilaterally. Respiratory: Respiratory effort was normal.  There is no rales, wheezes, or rhonchi. There is no stridor. Air entry is equal.  Cardiovascular: Regular rate and rhythm, no murmurs, gallops, rubs. Capillary refill is brisk.   No extremity patti List    START taking these medications    penicillin v potassium 500 MG Oral Tab  Take 1 tablet (500 mg total) by mouth 2 (two) times daily for 10 days.   Qty: 20 tablet Refills: 0    guaiFENesin-codeine (CHERATUSSIN AC) 100-10 MG/5ML Oral Solution  Take 5

## 2022-01-18 ENCOUNTER — TELEPHONE (OUTPATIENT)
Dept: INTERNAL MEDICINE CLINIC | Facility: CLINIC | Age: 57
End: 2022-01-18

## 2022-01-18 NOTE — TELEPHONE ENCOUNTER
Would be best to see in clinic as needs a full exam. Urgent care can see today Can schedule with me or Dr. Annelise Sherman tomorrow.     Bradley Sherman

## 2022-01-18 NOTE — TELEPHONE ENCOUNTER
Called patient and relayed DR. JENSEN message - transferred to [facility] to schedule chuck with  or DR. JENSEN for 1/19/22

## 2022-01-18 NOTE — TELEPHONE ENCOUNTER
Pt went to UC yesterday but did not stay as there was a 2 1/2 hour wait    Would like to see Dr Sandhya Yeung (or Rhonda Iraheta) for a lingering cough he has since diagnosed with strep/covid on 12/23  Pt states he will be fine for 5-6 hours and then has a coughing fit  Co

## 2022-01-19 ENCOUNTER — OFFICE VISIT (OUTPATIENT)
Dept: INTERNAL MEDICINE CLINIC | Facility: CLINIC | Age: 57
End: 2022-01-19
Payer: COMMERCIAL

## 2022-01-19 VITALS
OXYGEN SATURATION: 98 % | HEIGHT: 71 IN | WEIGHT: 250 LBS | TEMPERATURE: 99 F | SYSTOLIC BLOOD PRESSURE: 134 MMHG | DIASTOLIC BLOOD PRESSURE: 86 MMHG | BODY MASS INDEX: 35 KG/M2 | HEART RATE: 80 BPM

## 2022-01-19 DIAGNOSIS — Z86.16 PERSONAL HISTORY OF COVID-19: ICD-10-CM
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
## 2022-01-19 DIAGNOSIS — R05.9 COUGH: Primary | ICD-10-CM

## 2022-01-19 DIAGNOSIS — E78.5 DYSLIPIDEMIA: ICD-10-CM

## 2022-01-19 DIAGNOSIS — I25.10 CORONARY ARTERY DISEASE INVOLVING NATIVE CORONARY ARTERY OF NATIVE HEART WITHOUT ANGINA PECTORIS: ICD-10-CM

## 2022-01-19 PROCEDURE — 3075F SYST BP GE 130 - 139MM HG: CPT | Performed by: INTERNAL MEDICINE

## 2022-01-19 PROCEDURE — 99214 OFFICE O/P EST MOD 30 MIN: CPT | Performed by: INTERNAL MEDICINE

## 2022-01-19 PROCEDURE — 3008F BODY MASS INDEX DOCD: CPT | Performed by: INTERNAL MEDICINE

## 2022-01-19 PROCEDURE — 3079F DIAST BP 80-89 MM HG: CPT | Performed by: INTERNAL MEDICINE

## 2022-01-19 RX ORDER — AMLODIPINE BESYLATE 2.5 MG/1
TABLET ORAL
COMMUNITY
Start: 2021-11-25 | End: 2022-01-19

## 2022-01-19 RX ORDER — FLUTICASONE PROPIONATE 50 MCG
1 SPRAY, SUSPENSION (ML) NASAL DAILY
Qty: 3 EACH | Refills: 3 | Status: SHIPPED | OUTPATIENT
Start: 2022-01-19 | End: 2023-01-14

## 2022-01-19 RX ORDER — ATORVASTATIN CALCIUM 40 MG/1
40 TABLET, FILM COATED ORAL DAILY
Qty: 90 TABLET | Refills: 3 | Status: SHIPPED | OUTPATIENT
Start: 2022-01-19

## 2022-01-19 NOTE — PROGRESS NOTES
Chief Complaint:   No chief complaint on file. HPI:     Mr. Avery Garcia is a 64year old male PMHX coronary artery disease, dyslipidemia, hypertension, cervical radiculopathy coming in for cough and fall. Last seen by me 5/28/2021.   Interval history Allergies  Current Meds:  Current Outpatient Medications   Medication Sig Dispense Refill   • guaiFENesin-codeine (CHERATUSSIN AC) 100-10 MG/5ML Oral Solution Take 5 mL by mouth every 6 (six) hours as needed for cough.  50 mL 0   • benzonatate 100 MG Oral C Depression, Insomnia, Suicidal Ideation, Homicidal ideation, Memory Changes  Heme/Lymph: Bruising, Bleeding, Lymphadenopathy  Endocrine: Polyuria, Polydipsia, Temperature Intolerance    EXAM:   Vital Signs: There were no vitals taken for this visit.      C have not been displayed. A complete set of results can be found in Results Review.        Recent Results (from the past 8760 hour(s))   CBC W/ DIFFERENTIAL    Collection Time: 05/28/21  9:44 AM   Result Value Ref Range    WBC 6.2 4.0 - 11.0 x10(3) uL    RBC ***  -We will check a chest x-ray  – Continue with albuterol inhaler on as-needed basis  – Symptomatic relief with promethazine with codeine, Flonase, Zyrtec    Recent fall  Accidental fall on ice.   Exam notable for ***  -We will check x-ray of the right e

## 2022-01-19 NOTE — PROGRESS NOTES
Chief Complaint:   Patient presents with:  Checkup: Reports persistent dry cough s/p covid infection. Denies wheezing or SOB.  Pt reports falling 2 weeks ago on ice and hit elbow on side of car, reports R. index and middle finger of right hand pins and need 02/10/2018    tumor removal carotid     Social History:  Social History    Tobacco Use      Smoking status: Never Smoker      Smokeless tobacco: Never Used    Vaping Use      Vaping Use: Never used    Alcohol use: Yes      Comment: 1-2 glass of wine everyd Pain, Heartburn, Dysphagia, Bloody stools, Tarry stools  Genitourinary: Dysmenorrhea, Dysuria, Urinary Frequency, Hematuria, Urinary Incontinence, Urgency,  Flank Pain  Musculoskeletal: Arthralgias, Myalgias, Joint Swelling, Joint Stiffness, Back Pain, Nec mmol/L    Potassium 4.7 3.5 - 5.1 mmol/L    Chloride 107 98 - 112 mmol/L    CO2 29.0 21.0 - 32.0 mmol/L    Anion Gap 5 0 - 18 mmol/L    BUN 15 7 - 18 mg/dL    Creatinine 1.14 0.70 - 1.30 mg/dL    BUN/CREA Ratio 13.2 10.0 - 20.0    Calcium, Total 9.5 8.5 - Regadenoson ECG   Rare PVC's   Baseline hypertension   See nuclear report for findings Electronically signed on September 15, 2021 at 16:24 by Sorin Haile MD      ASSESSMENT AND PLAN:       Mr. Erika Cavazos is a 64year old male PMHX coronary artery disease,

## 2022-01-19 NOTE — PATIENT INSTRUCTIONS
You were seen in clinic today for:    Cough  Likely postinfectious cough from COVID infection  – We will check a chest x-ray  -We will do a trial of allergy medications.   Common cause of postinfectious cough is upper airway cough syndrome as well as conges

## 2022-01-20 ENCOUNTER — TELEPHONE (OUTPATIENT)
Dept: INTERNAL MEDICINE CLINIC | Facility: CLINIC | Age: 57
End: 2022-01-20

## 2022-01-20 ENCOUNTER — HOSPITAL ENCOUNTER (OUTPATIENT)
Dept: GENERAL RADIOLOGY | Age: 57
Discharge: HOME | End: 2022-01-20
Attending: INTERNAL MEDICINE
Payer: COMMERCIAL

## 2022-01-20 DIAGNOSIS — R05.9 COUGH: ICD-10-CM

## 2022-01-20 DIAGNOSIS — Z86.16 PERSONAL HISTORY OF COVID-19: ICD-10-CM

## 2022-01-20 PROCEDURE — 71046 X-RAY EXAM CHEST 2 VIEWS: CPT | Performed by: INTERNAL MEDICINE

## 2022-01-20 NOTE — TELEPHONE ENCOUNTER
Please notify the patient that the chest x-ray from today came back normal.  No evidence of scarring in the lungs.   No new recommendations at this time, lets give the medication recommendations from yesterday little bit more time

## 2022-03-20 ENCOUNTER — HOSPITAL ENCOUNTER (OUTPATIENT)
Age: 57
Discharge: HOME | End: 2022-03-20
Payer: COMMERCIAL

## 2022-03-20 VITALS
DIASTOLIC BLOOD PRESSURE: 90 MMHG | HEART RATE: 99 BPM | BODY MASS INDEX: 33.86 KG/M2 | OXYGEN SATURATION: 99 % | WEIGHT: 250 LBS | RESPIRATION RATE: 16 BRPM | HEIGHT: 72 IN | TEMPERATURE: 99 F | SYSTOLIC BLOOD PRESSURE: 150 MMHG

## 2022-03-20 DIAGNOSIS — R09.81 NASAL CONGESTION: ICD-10-CM

## 2022-03-20 DIAGNOSIS — J06.9 UPPER RESPIRATORY TRACT INFECTION, UNSPECIFIED TYPE: Primary | ICD-10-CM

## 2022-03-20 PROCEDURE — 99213 OFFICE O/P EST LOW 20 MIN: CPT

## 2022-03-20 RX ORDER — BENZONATATE 100 MG/1
100 CAPSULE ORAL 3 TIMES DAILY PRN
Qty: 15 CAPSULE | Refills: 0 | Status: SHIPPED | OUTPATIENT
Start: 2022-03-20

## 2022-03-20 RX ORDER — LORATADINE 10 MG/1
10 TABLET ORAL DAILY
Qty: 30 TABLET | Refills: 0 | Status: SHIPPED | OUTPATIENT
Start: 2022-03-20 | End: 2022-03-30

## 2022-03-20 NOTE — ED INITIAL ASSESSMENT (HPI)
Pt presents to the IC with c/o sinus congestion and pressure for the last 1.5 weeks. Pt notes a cough that started Thursday, non-productive but reports chest congestion. No fevers.

## 2022-03-22 ENCOUNTER — APPOINTMENT (OUTPATIENT)
Dept: GENERAL RADIOLOGY | Facility: HOSPITAL | Age: 57
End: 2022-03-22
Payer: COMMERCIAL

## 2022-03-22 ENCOUNTER — HOSPITAL ENCOUNTER (EMERGENCY)
Facility: HOSPITAL | Age: 57
Discharge: HOME | End: 2022-03-22
Attending: EMERGENCY MEDICINE
Payer: COMMERCIAL

## 2022-03-22 VITALS
TEMPERATURE: 98 F | RESPIRATION RATE: 18 BRPM | HEART RATE: 88 BPM | DIASTOLIC BLOOD PRESSURE: 84 MMHG | SYSTOLIC BLOOD PRESSURE: 130 MMHG | OXYGEN SATURATION: 95 %

## 2022-03-22 DIAGNOSIS — J40 BRONCHITIS: Primary | ICD-10-CM

## 2022-03-22 DIAGNOSIS — J01.90 ACUTE SINUSITIS, RECURRENCE NOT SPECIFIED, UNSPECIFIED LOCATION: ICD-10-CM

## 2022-03-22 DIAGNOSIS — R06.2 WHEEZING: ICD-10-CM

## 2022-03-22 LAB
ANION GAP SERPL CALC-SCNC: 3 MMOL/L (ref 0–18)
BASOPHILS # BLD AUTO: 0.03 X10(3) UL (ref 0–0.2)
BASOPHILS NFR BLD AUTO: 0.3 %
BILIRUB UR QL: NEGATIVE
BUN BLD-MCNC: 14 MG/DL (ref 7–18)
BUN/CREAT SERPL: 12.7 (ref 10–20)
CALCIUM BLD-MCNC: 9.2 MG/DL (ref 8.5–10.1)
CHLORIDE SERPL-SCNC: 106 MMOL/L (ref 98–112)
CO2 SERPL-SCNC: 28 MMOL/L (ref 21–32)
COLOR UR: YELLOW
CREAT BLD-MCNC: 1.1 MG/DL
DEPRECATED RDW RBC AUTO: 43.8 FL (ref 35.1–46.3)
EOSINOPHIL # BLD AUTO: 0.18 X10(3) UL (ref 0–0.7)
EOSINOPHIL NFR BLD AUTO: 1.9 %
ERYTHROCYTE [DISTWIDTH] IN BLOOD BY AUTOMATED COUNT: 12.9 % (ref 11–15)
GLUCOSE BLD-MCNC: 98 MG/DL (ref 70–99)
GLUCOSE UR-MCNC: NEGATIVE MG/DL
HCT VFR BLD AUTO: 46 %
HGB BLD-MCNC: 15.5 G/DL
HGB UR QL STRIP.AUTO: NEGATIVE
IMM GRANULOCYTES # BLD AUTO: 0.03 X10(3) UL (ref 0–1)
IMM GRANULOCYTES NFR BLD: 0.3 %
KETONES UR-MCNC: NEGATIVE MG/DL
LEUKOCYTE ESTERASE UR QL STRIP.AUTO: NEGATIVE
LYMPHOCYTES # BLD AUTO: 1.72 X10(3) UL (ref 1–4)
LYMPHOCYTES NFR BLD AUTO: 18.4 %
MCH RBC QN AUTO: 31.3 PG (ref 26–34)
MCHC RBC AUTO-ENTMCNC: 33.7 G/DL (ref 31–37)
MCV RBC AUTO: 92.9 FL
MONOCYTES # BLD AUTO: 1.07 X10(3) UL (ref 0.1–1)
MONOCYTES NFR BLD AUTO: 11.5 %
NEUTROPHILS # BLD AUTO: 6.31 X10 (3) UL (ref 1.5–7.7)
NEUTROPHILS # BLD AUTO: 6.31 X10(3) UL (ref 1.5–7.7)
NEUTROPHILS NFR BLD AUTO: 67.6 %
NITRITE UR QL STRIP.AUTO: NEGATIVE
NT-PROBNP SERPL-MCNC: 10 PG/ML (ref ?–125)
OSMOLALITY SERPL CALC.SUM OF ELEC: 284 MOSM/KG (ref 275–295)
PH UR: 6 [PH] (ref 5–8)
PLATELET # BLD AUTO: 199 10(3)UL (ref 150–450)
POTASSIUM SERPL-SCNC: 4 MMOL/L (ref 3.5–5.1)
PROT UR-MCNC: NEGATIVE MG/DL
RBC # BLD AUTO: 4.95 X10(6)UL
SARS-COV-2 RNA RESP QL NAA+PROBE: NOT DETECTED
SODIUM SERPL-SCNC: 137 MMOL/L (ref 136–145)
SP GR UR STRIP: 1.02 (ref 1–1.03)
TROPONIN I HIGH SENSITIVITY: 8 NG/L
UROBILINOGEN UR STRIP-ACNC: <2
VIT C UR-MCNC: NEGATIVE MG/DL
WBC # BLD AUTO: 9.3 X10(3) UL (ref 4–11)

## 2022-03-22 PROCEDURE — 85025 COMPLETE CBC W/AUTO DIFF WBC: CPT | Performed by: EMERGENCY MEDICINE

## 2022-03-22 PROCEDURE — 99284 EMERGENCY DEPT VISIT MOD MDM: CPT

## 2022-03-22 PROCEDURE — 81003 URINALYSIS AUTO W/O SCOPE: CPT | Performed by: EMERGENCY MEDICINE

## 2022-03-22 PROCEDURE — 36415 COLL VENOUS BLD VENIPUNCTURE: CPT

## 2022-03-22 PROCEDURE — 93010 ELECTROCARDIOGRAM REPORT: CPT | Performed by: EMERGENCY MEDICINE

## 2022-03-22 PROCEDURE — 83880 ASSAY OF NATRIURETIC PEPTIDE: CPT | Performed by: EMERGENCY MEDICINE

## 2022-03-22 PROCEDURE — 84484 ASSAY OF TROPONIN QUANT: CPT | Performed by: EMERGENCY MEDICINE

## 2022-03-22 PROCEDURE — 94640 AIRWAY INHALATION TREATMENT: CPT

## 2022-03-22 PROCEDURE — 80048 BASIC METABOLIC PNL TOTAL CA: CPT | Performed by: EMERGENCY MEDICINE

## 2022-03-22 PROCEDURE — 71045 X-RAY EXAM CHEST 1 VIEW: CPT

## 2022-03-22 PROCEDURE — 93005 ELECTROCARDIOGRAM TRACING: CPT

## 2022-03-22 RX ORDER — ALBUTEROL SULFATE 90 UG/1
2 AEROSOL, METERED RESPIRATORY (INHALATION) EVERY 4 HOURS PRN
Qty: 1 EACH | Refills: 0 | Status: SHIPPED | OUTPATIENT
Start: 2022-03-22 | End: 2022-04-21

## 2022-03-22 RX ORDER — HYDROCODONE BITARTRATE AND HOMATROPINE METHYLBROMIDE ORAL SOLUTION 5; 1.5 MG/5ML; MG/5ML
5 LIQUID ORAL EVERY 8 HOURS PRN
Qty: 75 ML | Refills: 0 | Status: SHIPPED | OUTPATIENT
Start: 2022-03-22

## 2022-03-22 RX ORDER — PREDNISONE 20 MG/1
40 TABLET ORAL DAILY
Qty: 8 TABLET | Refills: 0 | Status: SHIPPED | OUTPATIENT
Start: 2022-03-22 | End: 2022-03-26

## 2022-03-22 RX ORDER — PREDNISONE 20 MG/1
60 TABLET ORAL ONCE
Status: COMPLETED | OUTPATIENT
Start: 2022-03-22 | End: 2022-03-22

## 2022-03-22 RX ORDER — AZITHROMYCIN 250 MG/1
TABLET, FILM COATED ORAL
Qty: 6 TABLET | Refills: 0 | Status: SHIPPED | OUTPATIENT
Start: 2022-03-22 | End: 2022-03-27

## 2022-03-22 RX ORDER — IPRATROPIUM BROMIDE AND ALBUTEROL SULFATE 2.5; .5 MG/3ML; MG/3ML
3 SOLUTION RESPIRATORY (INHALATION) ONCE
Status: COMPLETED | OUTPATIENT
Start: 2022-03-22 | End: 2022-03-22

## 2022-03-22 NOTE — ED INITIAL ASSESSMENT (HPI)
Patient presents with complaint of SOB and cough that started Friday. Pt stated that he went to an urgent care and was told it was allergies but last night had to sleep in a recliner d/t sob.

## 2022-03-28 ENCOUNTER — TELEPHONE (OUTPATIENT)
Dept: INTERNAL MEDICINE CLINIC | Facility: CLINIC | Age: 57
End: 2022-03-28

## 2022-03-28 NOTE — TELEPHONE ENCOUNTER
Pt called, was in ER last week for bronchitis  Pt was told to follow up with primary this week  TAN[facility] \[facility] [place] [facility]\"" SYSTEM for patient to be seen in office or should virtual visit be scheduled?   Tasked to nursing

## 2022-03-28 NOTE — TELEPHONE ENCOUNTER
To Dr. Matteo Swan-- rapid COVID test from ER visit 3/22/22 negative. OK to schedule in person or would you prefer virtual visit?

## 2022-03-29 NOTE — TELEPHONE ENCOUNTER
Ok to change to Dr. Aponte Roles (to Dr. Aponte Roles if he is ok with visit, otherwise I can see for the acute tomorrow in person)

## 2022-03-29 NOTE — TELEPHONE ENCOUNTER
Pt. Called back following up on call from yesterday. Advised him that we were waiting for a response from Dr. Armin Sun. Pt. Is requesting to change his primary to Dr. Chalo Lemus as he has seen him several times. Pt. Can be reached at [phone].

## 2022-03-30 ENCOUNTER — OFFICE VISIT (OUTPATIENT)
Dept: INTERNAL MEDICINE CLINIC | Facility: CLINIC | Age: 57
End: 2022-03-30
Payer: COMMERCIAL

## 2022-03-30 VITALS
SYSTOLIC BLOOD PRESSURE: 114 MMHG | WEIGHT: 244 LBS | OXYGEN SATURATION: 96 % | HEIGHT: 72 IN | TEMPERATURE: 99 F | HEART RATE: 96 BPM | BODY MASS INDEX: 33.05 KG/M2 | DIASTOLIC BLOOD PRESSURE: 82 MMHG

## 2022-03-30 DIAGNOSIS — I25.10 CORONARY ARTERY DISEASE INVOLVING NATIVE CORONARY ARTERY OF NATIVE HEART WITHOUT ANGINA PECTORIS: ICD-10-CM

## 2022-03-30 DIAGNOSIS — I10 HTN (HYPERTENSION), BENIGN: ICD-10-CM

## 2022-03-30 DIAGNOSIS — R05.9 COUGH: Primary | ICD-10-CM

## 2022-03-30 PROCEDURE — 3074F SYST BP LT 130 MM HG: CPT | Performed by: INTERNAL MEDICINE

## 2022-03-30 PROCEDURE — 3008F BODY MASS INDEX DOCD: CPT | Performed by: INTERNAL MEDICINE

## 2022-03-30 PROCEDURE — 99214 OFFICE O/P EST MOD 30 MIN: CPT | Performed by: INTERNAL MEDICINE

## 2022-03-30 PROCEDURE — 3079F DIAST BP 80-89 MM HG: CPT | Performed by: INTERNAL MEDICINE

## 2022-03-30 RX ORDER — AMOXICILLIN AND CLAVULANATE POTASSIUM 875; 125 MG/1; MG/1
1 TABLET, FILM COATED ORAL 2 TIMES DAILY
Qty: 20 TABLET | Refills: 0 | Status: SHIPPED | OUTPATIENT
Start: 2022-03-30 | End: 2022-04-09

## 2022-03-30 RX ORDER — CETIRIZINE HYDROCHLORIDE 5 MG/1
5 TABLET ORAL DAILY
Qty: 90 TABLET | Refills: 1 | Status: SHIPPED | OUTPATIENT
Start: 2022-03-30

## 2022-05-04 ENCOUNTER — LAB ENCOUNTER (OUTPATIENT)
Dept: LAB | Age: 57
End: 2022-05-04
Attending: INTERNAL MEDICINE
Payer: COMMERCIAL

## 2022-05-04 DIAGNOSIS — E78.5 DYSLIPIDEMIA: ICD-10-CM

## 2022-05-04 DIAGNOSIS — E78.5 HYPERLIPIDEMIA LDL GOAL <100: ICD-10-CM

## 2022-05-04 DIAGNOSIS — I10 HTN (HYPERTENSION): Primary | ICD-10-CM

## 2022-05-04 DIAGNOSIS — I10 HTN (HYPERTENSION), BENIGN: ICD-10-CM

## 2022-05-04 LAB
ANION GAP SERPL CALC-SCNC: 5 MMOL/L (ref 0–18)
BUN BLD-MCNC: 13 MG/DL (ref 7–18)
BUN/CREAT SERPL: 11.8 (ref 10–20)
CALCIUM BLD-MCNC: 8.9 MG/DL (ref 8.5–10.1)
CHLORIDE SERPL-SCNC: 108 MMOL/L (ref 98–112)
CHOLEST SERPL-MCNC: 183 MG/DL (ref ?–200)
CO2 SERPL-SCNC: 27 MMOL/L (ref 21–32)
CREAT BLD-MCNC: 1.1 MG/DL
FASTING PATIENT LIPID ANSWER: YES
FASTING STATUS PATIENT QL REPORTED: YES
GLUCOSE BLD-MCNC: 88 MG/DL (ref 70–99)
HDLC SERPL-MCNC: 58 MG/DL (ref 40–59)
LDLC SERPL CALC-MCNC: 110 MG/DL (ref ?–100)
NONHDLC SERPL-MCNC: 125 MG/DL (ref ?–130)
OSMOLALITY SERPL CALC.SUM OF ELEC: 290 MOSM/KG (ref 275–295)
POTASSIUM SERPL-SCNC: 4.4 MMOL/L (ref 3.5–5.1)
SODIUM SERPL-SCNC: 140 MMOL/L (ref 136–145)
TRIGL SERPL-MCNC: 84 MG/DL (ref 30–149)
VLDLC SERPL CALC-MCNC: 14 MG/DL (ref 0–30)

## 2022-05-04 PROCEDURE — 80061 LIPID PANEL: CPT

## 2022-05-04 PROCEDURE — 36415 COLL VENOUS BLD VENIPUNCTURE: CPT

## 2022-05-04 PROCEDURE — 80048 BASIC METABOLIC PNL TOTAL CA: CPT

## 2022-05-11 ENCOUNTER — OFFICE VISIT (OUTPATIENT)
Dept: INTERNAL MEDICINE CLINIC | Facility: CLINIC | Age: 57
End: 2022-05-11
Payer: COMMERCIAL

## 2022-05-11 ENCOUNTER — TELEPHONE (OUTPATIENT)
Dept: INTERNAL MEDICINE CLINIC | Facility: CLINIC | Age: 57
End: 2022-05-11

## 2022-05-11 VITALS
TEMPERATURE: 97 F | BODY MASS INDEX: 34 KG/M2 | SYSTOLIC BLOOD PRESSURE: 142 MMHG | OXYGEN SATURATION: 99 % | DIASTOLIC BLOOD PRESSURE: 88 MMHG | HEART RATE: 77 BPM | WEIGHT: 251 LBS | HEIGHT: 72 IN

## 2022-05-11 DIAGNOSIS — I25.10 CORONARY ARTERY DISEASE INVOLVING NATIVE CORONARY ARTERY OF NATIVE HEART WITHOUT ANGINA PECTORIS: ICD-10-CM

## 2022-05-11 DIAGNOSIS — M54.12 CERVICAL RADICULOPATHY: Primary | ICD-10-CM

## 2022-05-11 DIAGNOSIS — I10 HTN (HYPERTENSION), BENIGN: ICD-10-CM

## 2022-05-11 PROCEDURE — 3077F SYST BP >= 140 MM HG: CPT | Performed by: INTERNAL MEDICINE

## 2022-05-11 PROCEDURE — 3079F DIAST BP 80-89 MM HG: CPT | Performed by: INTERNAL MEDICINE

## 2022-05-11 PROCEDURE — 3008F BODY MASS INDEX DOCD: CPT | Performed by: INTERNAL MEDICINE

## 2022-05-11 PROCEDURE — 99214 OFFICE O/P EST MOD 30 MIN: CPT | Performed by: INTERNAL MEDICINE

## 2022-05-11 RX ORDER — METHYLPREDNISOLONE 4 MG/1
TABLET ORAL
Qty: 1 EACH | Refills: 0 | Status: SHIPPED | OUTPATIENT
Start: 2022-05-11

## 2022-05-11 RX ORDER — CYCLOBENZAPRINE HCL 5 MG
5 TABLET ORAL 3 TIMES DAILY PRN
Qty: 60 TABLET | Refills: 1 | Status: SHIPPED | OUTPATIENT
Start: 2022-05-11

## 2022-05-11 NOTE — TELEPHONE ENCOUNTER
Patient calling for muscle relaxer. Was bowling Thursday night, woke up Friday with pain. Patient feels he has pinched nerve in neck again. Taking 4 Advil every 6 hours. Pain goes from upper back shouting down into forearm. He has this in the past, went to physical therapy for it. Has been the exercises he was taught in PT. Exercises are not helping. Pain is 6-7 out of 10. Over the weekend was level 10.     [facility]    Best call back number [phone]

## 2022-05-29 RX ORDER — AMLODIPINE BESYLATE 2.5 MG/1
TABLET ORAL
Qty: 90 TABLET | Refills: 3 | OUTPATIENT
Start: 2022-05-29

## 2022-11-02 ENCOUNTER — PATIENT MESSAGE (OUTPATIENT)
Dept: INTERNAL MEDICINE CLINIC | Facility: CLINIC | Age: 57
End: 2022-11-02

## 2022-11-02 NOTE — TELEPHONE ENCOUNTER
From: Jocelyne Shah  To: Bianca Hills MD  Sent: 11/2/2022 2:49 PM CDT  Subject: Covid Booster and Shingles shot    Hi Dr. Beau Timmons,     Is it ok for me to receive my Covid booster and shingles shot on the same day or should I schedule them for different day?     Thanks, Elizabeth Al

## 2023-01-16 RX ORDER — ATORVASTATIN CALCIUM 40 MG/1
TABLET, FILM COATED ORAL
Qty: 90 TABLET | Refills: 0 | Status: SHIPPED | OUTPATIENT
Start: 2023-01-16

## 2023-05-08 RX ORDER — ATORVASTATIN CALCIUM 40 MG/1
TABLET, FILM COATED ORAL
Qty: 30 TABLET | Refills: 0 | Status: SHIPPED | OUTPATIENT
Start: 2023-05-08

## 2023-06-27 ENCOUNTER — LAB ENCOUNTER (OUTPATIENT)
Dept: LAB | Age: 58
End: 2023-06-27
Attending: INTERNAL MEDICINE
Payer: COMMERCIAL

## 2023-06-27 ENCOUNTER — OFFICE VISIT (OUTPATIENT)
Dept: INTERNAL MEDICINE CLINIC | Facility: CLINIC | Age: 58
End: 2023-06-27

## 2023-06-27 VITALS
TEMPERATURE: 98 F | SYSTOLIC BLOOD PRESSURE: 130 MMHG | RESPIRATION RATE: 20 BRPM | BODY MASS INDEX: 34.81 KG/M2 | WEIGHT: 257 LBS | DIASTOLIC BLOOD PRESSURE: 80 MMHG | HEART RATE: 80 BPM | OXYGEN SATURATION: 96 % | HEIGHT: 72 IN

## 2023-06-27 DIAGNOSIS — Z13.0 SCREENING FOR DEFICIENCY ANEMIA: ICD-10-CM

## 2023-06-27 DIAGNOSIS — Z13.29 SCREENING FOR THYROID DISORDER: ICD-10-CM

## 2023-06-27 DIAGNOSIS — M25.551 BILATERAL HIP PAIN: ICD-10-CM

## 2023-06-27 DIAGNOSIS — Z00.00 ANNUAL PHYSICAL EXAM: ICD-10-CM

## 2023-06-27 DIAGNOSIS — M25.50 ARTHRALGIA, UNSPECIFIED JOINT: ICD-10-CM

## 2023-06-27 DIAGNOSIS — G89.29 CHRONIC PAIN OF RIGHT KNEE: ICD-10-CM

## 2023-06-27 DIAGNOSIS — I25.10 CORONARY ARTERY DISEASE INVOLVING NATIVE CORONARY ARTERY OF NATIVE HEART WITHOUT ANGINA PECTORIS: ICD-10-CM

## 2023-06-27 DIAGNOSIS — E78.5 DYSLIPIDEMIA: ICD-10-CM

## 2023-06-27 DIAGNOSIS — Z12.5 SCREENING FOR PROSTATE CANCER: ICD-10-CM

## 2023-06-27 DIAGNOSIS — I10 HTN (HYPERTENSION), BENIGN: ICD-10-CM

## 2023-06-27 DIAGNOSIS — M25.561 CHRONIC PAIN OF RIGHT KNEE: ICD-10-CM

## 2023-06-27 DIAGNOSIS — Z13.1 SCREENING FOR DIABETES MELLITUS: ICD-10-CM

## 2023-06-27 DIAGNOSIS — Z00.00 ANNUAL PHYSICAL EXAM: Primary | ICD-10-CM

## 2023-06-27 DIAGNOSIS — M54.12 CERVICAL RADICULOPATHY: ICD-10-CM

## 2023-06-27 DIAGNOSIS — M25.552 BILATERAL HIP PAIN: ICD-10-CM

## 2023-06-27 LAB
ALBUMIN SERPL-MCNC: 4 G/DL (ref 3.4–5)
ALBUMIN/GLOB SERPL: 1.2 {RATIO} (ref 1–2)
ALP LIVER SERPL-CCNC: 85 U/L
ALT SERPL-CCNC: 76 U/L
ANION GAP SERPL CALC-SCNC: 8 MMOL/L (ref 0–18)
AST SERPL-CCNC: 21 U/L (ref 15–37)
BASOPHILS # BLD AUTO: 0.05 X10(3) UL (ref 0–0.2)
BASOPHILS NFR BLD AUTO: 0.8 %
BILIRUB SERPL-MCNC: 1.1 MG/DL (ref 0.1–2)
BUN BLD-MCNC: 13 MG/DL (ref 7–18)
BUN/CREAT SERPL: 12.6 (ref 10–20)
CALCIUM BLD-MCNC: 9.5 MG/DL (ref 8.5–10.1)
CHLORIDE SERPL-SCNC: 106 MMOL/L (ref 98–112)
CHOLEST SERPL-MCNC: 169 MG/DL (ref ?–200)
CO2 SERPL-SCNC: 26 MMOL/L (ref 21–32)
COMPLEXED PSA SERPL-MCNC: 2.2 NG/ML (ref ?–4)
CREAT BLD-MCNC: 1.03 MG/DL
CRP SERPL-MCNC: <0.29 MG/DL (ref ?–0.3)
DEPRECATED RDW RBC AUTO: 40.5 FL (ref 35.1–46.3)
EOSINOPHIL # BLD AUTO: 0.18 X10(3) UL (ref 0–0.7)
EOSINOPHIL NFR BLD AUTO: 2.7 %
ERYTHROCYTE [DISTWIDTH] IN BLOOD BY AUTOMATED COUNT: 12.2 % (ref 11–15)
FASTING PATIENT LIPID ANSWER: YES
FASTING STATUS PATIENT QL REPORTED: YES
GFR SERPLBLD BASED ON 1.73 SQ M-ARVRAT: 85 ML/MIN/1.73M2 (ref 60–?)
GLOBULIN PLAS-MCNC: 3.3 G/DL (ref 2.8–4.4)
GLUCOSE BLD-MCNC: 100 MG/DL (ref 70–99)
HCT VFR BLD AUTO: 45.5 %
HDLC SERPL-MCNC: 59 MG/DL (ref 40–59)
HGB BLD-MCNC: 15.5 G/DL
IMM GRANULOCYTES # BLD AUTO: 0.02 X10(3) UL (ref 0–1)
IMM GRANULOCYTES NFR BLD: 0.3 %
LDLC SERPL CALC-MCNC: 91 MG/DL (ref ?–100)
LYMPHOCYTES # BLD AUTO: 2.26 X10(3) UL (ref 1–4)
LYMPHOCYTES NFR BLD AUTO: 34 %
MCH RBC QN AUTO: 31.1 PG (ref 26–34)
MCHC RBC AUTO-ENTMCNC: 34.1 G/DL (ref 31–37)
MCV RBC AUTO: 91.4 FL
MONOCYTES # BLD AUTO: 0.58 X10(3) UL (ref 0.1–1)
MONOCYTES NFR BLD AUTO: 8.7 %
NEUTROPHILS # BLD AUTO: 3.56 X10 (3) UL (ref 1.5–7.7)
NEUTROPHILS # BLD AUTO: 3.56 X10(3) UL (ref 1.5–7.7)
NEUTROPHILS NFR BLD AUTO: 53.5 %
NONHDLC SERPL-MCNC: 110 MG/DL (ref ?–130)
OSMOLALITY SERPL CALC.SUM OF ELEC: 290 MOSM/KG (ref 275–295)
PLATELET # BLD AUTO: 227 10(3)UL (ref 150–450)
POTASSIUM SERPL-SCNC: 4 MMOL/L (ref 3.5–5.1)
PROT SERPL-MCNC: 7.3 G/DL (ref 6.4–8.2)
RBC # BLD AUTO: 4.98 X10(6)UL
RHEUMATOID FACT SERPL-ACNC: <10 IU/ML (ref ?–15)
SODIUM SERPL-SCNC: 140 MMOL/L (ref 136–145)
T4 FREE SERPL-MCNC: 0.9 NG/DL (ref 0.8–1.7)
TRIGL SERPL-MCNC: 105 MG/DL (ref 30–149)
TSI SER-ACNC: 5.33 MIU/ML (ref 0.36–3.74)
VLDLC SERPL CALC-MCNC: 17 MG/DL (ref 0–30)
WBC # BLD AUTO: 6.7 X10(3) UL (ref 4–11)

## 2023-06-27 PROCEDURE — 86225 DNA ANTIBODY NATIVE: CPT

## 2023-06-27 PROCEDURE — 86140 C-REACTIVE PROTEIN: CPT

## 2023-06-27 PROCEDURE — 3079F DIAST BP 80-89 MM HG: CPT | Performed by: INTERNAL MEDICINE

## 2023-06-27 PROCEDURE — 80053 COMPREHEN METABOLIC PANEL: CPT

## 2023-06-27 PROCEDURE — 3075F SYST BP GE 130 - 139MM HG: CPT | Performed by: INTERNAL MEDICINE

## 2023-06-27 PROCEDURE — 86038 ANTINUCLEAR ANTIBODIES: CPT

## 2023-06-27 PROCEDURE — 80061 LIPID PANEL: CPT

## 2023-06-27 PROCEDURE — 84439 ASSAY OF FREE THYROXINE: CPT

## 2023-06-27 PROCEDURE — 86431 RHEUMATOID FACTOR QUANT: CPT

## 2023-06-27 PROCEDURE — 36415 COLL VENOUS BLD VENIPUNCTURE: CPT

## 2023-06-27 PROCEDURE — 3008F BODY MASS INDEX DOCD: CPT | Performed by: INTERNAL MEDICINE

## 2023-06-27 PROCEDURE — 99396 PREV VISIT EST AGE 40-64: CPT | Performed by: INTERNAL MEDICINE

## 2023-06-27 PROCEDURE — 84443 ASSAY THYROID STIM HORMONE: CPT

## 2023-06-27 PROCEDURE — 85025 COMPLETE CBC W/AUTO DIFF WBC: CPT

## 2023-06-27 RX ORDER — TRIAMCINOLONE ACETONIDE 1 MG/G
CREAM TOPICAL 2 TIMES DAILY PRN
Qty: 60 G | Refills: 3 | Status: SHIPPED | OUTPATIENT
Start: 2023-06-27

## 2023-06-28 RX ORDER — ATORVASTATIN CALCIUM 40 MG/1
TABLET, FILM COATED ORAL
Qty: 90 TABLET | Refills: 3 | Status: SHIPPED | OUTPATIENT
Start: 2023-06-28

## 2023-06-29 LAB
DSDNA IGG SERPL IA-ACNC: 1.4 IU/ML
ENA AB SER QL IA: 0.3 UG/L
ENA AB SER QL IA: NEGATIVE

## 2023-07-10 ENCOUNTER — TELEPHONE (OUTPATIENT)
Dept: INTERNAL MEDICINE CLINIC | Facility: CLINIC | Age: 58
End: 2023-07-10

## 2023-07-10 NOTE — TELEPHONE ENCOUNTER
Please notify patient that I reviewed the blood work from 6/27:    Labs  1 liver test was slightly elevated with ALT 76, glucose at the borderline  Cholesterol remains well controlled on the statin  He does have subclinical hypothyroidism with slightly low levels. All other test including the arthritis testing came back normal        Recommendations  Lets obtain the x-rays as ordered, we can follow-up on these results. Hopefully symptoms are improving with time, Tylenol, and should start physical therapy.

## 2023-07-10 NOTE — TELEPHONE ENCOUNTER
Called patient relaying Ofelia Arango MD result message below. Patient said will go for Xrays as stated below.

## 2023-07-19 ENCOUNTER — HOSPITAL ENCOUNTER (OUTPATIENT)
Dept: GENERAL RADIOLOGY | Age: 58
Discharge: HOME | End: 2023-07-19
Attending: INTERNAL MEDICINE
Payer: COMMERCIAL

## 2023-07-19 DIAGNOSIS — M25.50 ARTHRALGIA, UNSPECIFIED JOINT: ICD-10-CM

## 2023-07-19 DIAGNOSIS — G89.29 CHRONIC PAIN OF RIGHT KNEE: ICD-10-CM

## 2023-07-19 DIAGNOSIS — M25.561 CHRONIC PAIN OF RIGHT KNEE: ICD-10-CM

## 2023-07-19 DIAGNOSIS — M25.551 BILATERAL HIP PAIN: ICD-10-CM

## 2023-07-19 DIAGNOSIS — M25.552 BILATERAL HIP PAIN: ICD-10-CM

## 2023-07-19 PROCEDURE — 73523 X-RAY EXAM HIPS BI 5/> VIEWS: CPT | Performed by: INTERNAL MEDICINE

## 2023-07-19 PROCEDURE — 73562 X-RAY EXAM OF KNEE 3: CPT | Performed by: INTERNAL MEDICINE

## 2023-08-02 ENCOUNTER — TELEPHONE (OUTPATIENT)
Dept: INTERNAL MEDICINE CLINIC | Facility: CLINIC | Age: 58
End: 2023-08-02

## 2023-08-02 DIAGNOSIS — M17.10 ARTHRITIS OF KNEE: ICD-10-CM

## 2023-08-02 DIAGNOSIS — M16.0 PRIMARY OSTEOARTHRITIS OF BOTH HIPS: Primary | ICD-10-CM

## 2023-08-02 NOTE — TELEPHONE ENCOUNTER
Please supply patient with the x-rays from 7/19:    X-ray of the right knee showed no fracture nor dislocation. Mild tricompartmental osteoarthritis  Similarly, no acute fracture nor dislocation of the hips. There is mild left hip degenerative changes, however the right hip seems to have progressed. Next Step would be physical therapy. If still no improvement, we may need to see an orthopedic specialist as this arthritis can progress over time. To schedule Physical Therapy at any of the [facility] facilities, please call   [phone].      [employer] in [facility] SYSTEM OF [facility]  [id number] [facility] in [address] [place] [address], [address] [place] [address] Service in [address] [place] [address], [facility] Surgeons Dr Rylee Adrian in [address] [place] [address]  [employer] in [place] [address].[facility]

## 2023-08-04 NOTE — TELEPHONE ENCOUNTER
Spoke with patient. Relayed MD message. Pt verbalized understanding. [employer] message sent to pt with PT referral info.

## 2023-12-29 NOTE — TELEPHONE ENCOUNTER
Called and Relayed MD's message to patient---verbalized understanding Dl seen today to follow up on his occipital wound. No family at the bedside. Seen with nursing.         With Assessment: Only assessed his head today, he was seen earlier this week for all body areas. He is intubated in a critical care crib with developmental positioners, he is turned to the right side, head on float positioner. Head with dark hair on top, EVD in place, head palpated. Back of head with vertical surgical incision, larger open area noted today, see below. Did am dressing change of Aquacel Ag and Mepilex lite at the site with Nursing. Discussed wound area with nursing. Repositioned with nursing.      12/29/23 1044   Wound 12/15/23 Head Dorsal   Date First Assessed: 12/15/23   Present on Original Admission: No  Wound Approximate Age at First Assessment (Weeks): 0 weeks  Hand Hygiene Completed: Yes  Location: Head  Wound Location Orientation: Dorsal   Wound Image Images linked   Wound Length (cm) 8 cm   Wound Width (cm) 1 cm   Wound Surface Area (cm^2) 8 cm^2   Wound Depth (cm) 0.1 cm   Wound Volume (cm^3) 0.8 cm^3      Photo: Back of head when turned to right; <--feet -->top of head      Wound location: Posterior occiput     size: Vertical open area is inferior; 8cm x 1cm x shallow(0.1cm); superior 2cm is intact and adherent scabbing                          undermining: none      tracking: none    Wound type: Surgical area opening  Wound bed: Occipital vertical incision with lower portion open pink/dried yellow/dark scabbing, top is adherent dark scabbing  Draining: Scant yellow drainage on removed dressing  Periwound skin: Intact, boggy, normopigmented dry skin  Therapeutic surface: Float positioner, critical care crib     Recommendation: Agree with neurosurgery recs for using Aquacel Ag Dressing and Mepilex Lite over the posterior head wound area, change daily and as needed if saturated. If needed: Aquacel Ag dressing is  #[id number] and Mepilex Lite is  #[id number]. Agree with neurosurgery recs  for turning side to side off of the back of the head for pressure relief of the site. Appreciate surgical recommendations. Cleanse and moisturize per division standards. Monitor skin.   Positioning: Turn and reposition at least every 2 hours, utilize float positioners for positioning if unable to turn.     Supplies are available at the bedside.     Bedside RN and PICU team Resident aware of recommendations.      Plan:  call with questions or if condition changes.      Gracy Landa APRN-CNP [facility]  Certified Wound and Ostomy Nurse   Secure Chat  Pager #[phone]      I spent 35 minutes in the care of this patient.

## 2024-01-15 ENCOUNTER — TELEPHONE (OUTPATIENT)
Dept: INTERNAL MEDICINE CLINIC | Facility: CLINIC | Age: 59
End: 2024-01-15

## 2024-01-15 NOTE — TELEPHONE ENCOUNTER
Respiratory infection triage:    Fever:  [x]  No fever  []  Fever>100.4    Cough:  [] Tight cough  [] Cough with exertion  [x] Dry cough  [] Sputum production, Color:     Breathing:  [] Mild shortness of breath interfering with activity  [] Wheezing:  [] Pain with deep breathing  [] Using inhaler    Other symptoms:  [x] Sore throat:last week resolved   [] Difficulty swallowing  [x] Nasal drainage/congestion  [x] Sinus congestion/pressure  [x] Ear pain:right,itch  [] Body aches  [] Poor appetite  [] Loss of sense of smell   [] Loss of sense of taste  []Conjunctivitis?    [] Any recent travel?  [] Any sick contacts?  [] Are you a healthcare worker?        ADDITIONAL NOTES:  Please advise -called patient who states he has the cough for about 2.5 - 3 months,and other SX for 2 weeks.took covid test 8 days ago which was negative. Will take another covid test and call back . Has used tylenol . Will be using [facility] in [place] . He will take another covid test and call back - to           Notifnato patient that we will route this message to the doctor and see what their recommendations would be. In the meantime, if anything worsens, they were advised to call back or seek emergent evaluation.

## 2024-01-15 NOTE — TELEPHONE ENCOUNTER
Please call patient  He has been sick for about 2 1/2 weeks feels like flu, tested negative for COVDI  Prolonged cough for about 2-3 months  Coughs 15-20 times a day, increased over time, not short of breath, but when he swallows sometimes feels like a lump  Does patient need to be seen?  No openings this week  Does patient need a chest xray?  Tasked to nursing

## 2024-01-16 ENCOUNTER — HOSPITAL ENCOUNTER (OUTPATIENT)
Dept: GENERAL RADIOLOGY | Age: 59
Discharge: HOME | End: 2024-01-16
Attending: INTERNAL MEDICINE
Payer: COMMERCIAL

## 2024-01-16 ENCOUNTER — OFFICE VISIT (OUTPATIENT)
Dept: INTERNAL MEDICINE CLINIC | Facility: CLINIC | Age: 59
End: 2024-01-16

## 2024-01-16 VITALS
TEMPERATURE: 98 F | HEART RATE: 92 BPM | WEIGHT: 263 LBS | OXYGEN SATURATION: 99 % | SYSTOLIC BLOOD PRESSURE: 120 MMHG | HEIGHT: 72 IN | BODY MASS INDEX: 35.62 KG/M2 | DIASTOLIC BLOOD PRESSURE: 82 MMHG

## 2024-01-16 DIAGNOSIS — B96.89 ACUTE BACTERIAL RHINOSINUSITIS: ICD-10-CM

## 2024-01-16 DIAGNOSIS — J01.90 ACUTE BACTERIAL RHINOSINUSITIS: ICD-10-CM

## 2024-01-16 DIAGNOSIS — I25.10 CORONARY ARTERY DISEASE INVOLVING NATIVE CORONARY ARTERY OF NATIVE HEART WITHOUT ANGINA PECTORIS: ICD-10-CM

## 2024-01-16 DIAGNOSIS — R05.3 CHRONIC COUGH: ICD-10-CM

## 2024-01-16 DIAGNOSIS — I10 HTN (HYPERTENSION), BENIGN: ICD-10-CM

## 2024-01-16 DIAGNOSIS — R05.3 CHRONIC COUGH: Primary | ICD-10-CM

## 2024-01-16 DIAGNOSIS — E78.5 DYSLIPIDEMIA: ICD-10-CM

## 2024-01-16 PROCEDURE — 3008F BODY MASS INDEX DOCD: CPT | Performed by: INTERNAL MEDICINE

## 2024-01-16 PROCEDURE — 3074F SYST BP LT 130 MM HG: CPT | Performed by: INTERNAL MEDICINE

## 2024-01-16 PROCEDURE — 99214 OFFICE O/P EST MOD 30 MIN: CPT | Performed by: INTERNAL MEDICINE

## 2024-01-16 PROCEDURE — 71046 X-RAY EXAM CHEST 2 VIEWS: CPT | Performed by: INTERNAL MEDICINE

## 2024-01-16 PROCEDURE — 3079F DIAST BP 80-89 MM HG: CPT | Performed by: INTERNAL MEDICINE

## 2024-01-16 RX ORDER — BENZONATATE 200 MG/1
200 CAPSULE ORAL 3 TIMES DAILY PRN
Qty: 30 CAPSULE | Refills: 0 | Status: SHIPPED | OUTPATIENT
Start: 2024-01-16

## 2024-01-16 RX ORDER — AMOXICILLIN AND CLAVULANATE POTASSIUM 875; 125 MG/1; MG/1
1 TABLET, FILM COATED ORAL 2 TIMES DAILY
Qty: 20 TABLET | Refills: 0 | Status: SHIPPED | OUTPATIENT
Start: 2024-01-16 | End: 2024-01-26

## 2024-01-16 NOTE — PROGRESS NOTES
Elias Shah is a 58 year old male.    HPI:     Chief Complaint   Patient presents with    Checkup     Here today for checkup; Congestion & Cold-like symptoms x 2 weeks, Cough x 3 months. Tried OTC cough drops, tylenol, oj, water for hydration. Test -negative for COVID 2 x.      Mr. Shah is a 58 year old male hypertension, hyperlipidemia who presents today for sick visit.    Said a cough over the last 2-1/2-3 months.  Symptoms worsened over the last 2 weeks, COVID test negative.  Has tried Tylenol.  Did have associated nasal congestion, sinus pressure, right ear pain.  Did have a sore throat but this has resolved.    Recalls cough for 3 months, annoying dry cough, No inciting event. The cough occurred 5-10 times a day. Has tried mucinex and cough drops. 2.5 weeks ago, standard cough stuffy nose, body aches, fatigue. Cough worsened more violent.     Uses zyrtec/claritin, Tries to avoid the use flonase/afrin.     HISTORY:  Past Medical History:   Diagnosis Date    Cervical radiculopathy     Essential hypertension     Hyperlipidemia       Past Surgical History:   Procedure Laterality Date    COLONOSCOPY  5/2015    OTHER SURGICAL HISTORY  02/10/2018    tumor removal carotid    VASECTOMY  2002      Family History   Problem Relation Age of Onset    Cancer Father         bladder, prostate    Heart Disorder Father         MI at age 40    Cancer Mother 81        lung, smoker    Other (hypothyroidism) Mother     Cancer Sister     Other (Rheumatoid Arthritis) Sister     Hypertension Brother     Stroke Brother         age 56 (second stroke)    Heart Attack Paternal Grandfather         age 50      Social History:   Social History     Socioeconomic History    Marital status:      Spouse name: Juana     Number of children: 2   Occupational History    Occupation:     Tobacco Use    Smoking status: Never     Passive exposure: Never    Smokeless tobacco: Never    Tobacco comments:     may have a cigar  once or twice a year   Vaping Use    Vaping Use: Never used   Substance and Sexual Activity    Alcohol use: Yes     Alcohol/week: 9.0 standard drinks of alcohol     Types: 5 Glasses of wine, 4 Cans of beer per week     Comment: usually have a glass or two of wine at night    Drug use: No        Medications (Active prior to today's visit):  Current Outpatient Medications   Medication Sig Dispense Refill    atorvastatin 40 MG Oral Tab TAKE 1 TABLET BY MOUTH EVERY DAY 90 tablet 3    triamcinolone 0.1 % External Cream Apply topically 2 (two) times daily as needed. 60 g 3    cyclobenzaprine 5 MG Oral Tab Take 1 tablet (5 mg total) by mouth 3 (three) times daily as needed for Muscle spasms. 60 tablet 1    aspirin (ASPIR-LOW) 81 MG Oral Tab EC Take 1 tablet (81 mg total) by mouth daily. 30 tablet 11    Losartan Potassium 25 MG Oral Tab Take 1 tablet (25 mg total) by mouth daily. 30 tablet 5       Allergies:  No Known Allergies      ROS:   Positive Findings indicated in BOLD    Constitutional: Fever, Chills, Weight Gain, Weight Loss, Night Sweats, Fatigue, Malaise  ENT/Mouth:  Hearing Changes, Ear Pain, Nasal Congestion, Sinus Pain, Hoarseness, Sore throat, Rhinorrhea, Swallowing Difficulty  Eyes: Eye Pain, Swelling, Redness, Foreign Body, Discharge, Vision Changes  Cardiovascular: Chest Pain, SOB, PND, Dyspnea on Exertion, Orthopnea, Claudication, Edema, Palpitations  Respiratory: Cough, Sputum, Wheezing, Shortness of breath  Gastrointestinal: Nausea, Vomiting, Diarrhea, Constipation, Pain, Heartburn, Dysphagia, Bloody stools, Tarry stools  Genitourinary: Dysmenorrhea, Dysuria, Urinary Frequency, Hematuria, Urinary Incontinence, Urgency,  Flank Pain  Musculoskeletal: Arthralgias, Myalgias, Joint Swelling, Joint Stiffness, Back Pain, Neck Pain, Joint pain radiating groin pain  Integumentary: Skin Lesions, Pruritis, Hair Changes, Jaundice, Nail changes  Neuro: Weakness, Numbness, Paresthesias, Loss of Consciousness, Syncope,  Dizziness, Headache, Falls  Psych: Anxiety, Depression, Insomnia, Suicidal Ideation, Homicidal ideation, Memory Changes  Heme/Lymph: Bruising, Bleeding, Lymphadenopathy  Endocrine: Polyuria, Polydipsia, Temperature Intolerance    PHYSICAL EXAM:   Vital Signs:  Blood pressure 120/82, pulse 92, temperature 97.7 °F (36.5 °C), height 6' (1.829 m), weight 263 lb (119.3 kg), SpO2 99%.     Constitutional: No acute distress. Alert and oriented x 3.  Eyes: EOMI, PERRLA, clear sclera b/l  HENT: NCAT, Moist mucous membranes, Oropharynx without erythema or exudates  Neck: No JVD, no thyromegaly  Cardiovascular: S1, S2, no S3, no S4, Regular rate and rhythm, No murmurs/gallops/rubs.   Vascular: Equal pulses 2+ carotids without bruits or thrills/DP/PT  Respiratory: Clear to auscultation bilaterally.  No wheezes/rales/rhonchi  Gastrointestinal: Soft, nontender, nondistended. Positive bowel sounds x 4. No rebound tenderness. No hepatomegaly, No splenomegaly  Genitourinary: No CVA tenderness bilaterally  Neurologic: No focal neurological deficits, CN II-XII intact, light touch intact, MSK Strength 5/5 and symmetric in all extremities, normal gait  Musculoskeletal: Full range of motion of all extremities, no clubbing/swelling/edema;  Psychiatric: Appropriate mood and affect  Heme/Lymph/Immune: No cervical/inguinal LAD      DATA REVIEWED   Labs:  Recent Results (from the past 8760 hour(s))   CBC W/ DIFFERENTIAL    Collection Time: 06/27/23  2:37 PM   Result Value Ref Range    WBC 6.7 4.0 - 11.0 x10(3) uL    RBC 4.98 4.30 - 5.70 x10(6)uL    HGB 15.5 13.0 - 17.5 g/dL    HCT 45.5 39.0 - 53.0 %    MCV 91.4 80.0 - 100.0 fL    MCH 31.1 26.0 - 34.0 pg    MCHC 34.1 31.0 - 37.0 g/dL    RDW-SD 40.5 35.1 - 46.3 fL    RDW 12.2 11.0 - 15.0 %    .0 150.0 - 450.0 10(3)uL    Neutrophil Absolute Prelim 3.56 1.50 - 7.70 x10 (3) uL    Neutrophil Absolute 3.56 1.50 - 7.70 x10(3) uL    Lymphocyte Absolute 2.26 1.00 - 4.00 x10(3) uL    Monocyte  Absolute 0.58 0.10 - 1.00 x10(3) uL    Eosinophil Absolute 0.18 0.00 - 0.70 x10(3) uL    Basophil Absolute 0.05 0.00 - 0.20 x10(3) uL    Immature Granulocyte Absolute 0.02 0.00 - 1.00 x10(3) uL    Neutrophil % 53.5 %    Lymphocyte % 34.0 %    Monocyte % 8.7 %    Eosinophil % 2.7 %    Basophil % 0.8 %    Immature Granulocyte % 0.3 %     *Note: Due to a large number of results and/or encounters for the requested time period, some results have not been displayed. A complete set of results can be found in Results Review.       Recent Results (from the past 8760 hour(s))   Comp Metabolic Panel (14)    Collection Time: 06/27/23  2:37 PM   Result Value Ref Range    Glucose 100 (H) 70 - 99 mg/dL    Sodium 140 136 - 145 mmol/L    Potassium 4.0 3.5 - 5.1 mmol/L    Chloride 106 98 - 112 mmol/L    CO2 26.0 21.0 - 32.0 mmol/L    Anion Gap 8 0 - 18 mmol/L    BUN 13 7 - 18 mg/dL    Creatinine 1.03 0.70 - 1.30 mg/dL    BUN/CREA Ratio 12.6 10.0 - 20.0    Calcium, Total 9.5 8.5 - 10.1 mg/dL    Calculated Osmolality 290 275 - 295 mOsm/kg    eGFR-Cr 85 >=60 mL/min/1.73m2    ALT 76 (H) 16 - 61 U/L    AST 21 15 - 37 U/L    Alkaline Phosphatase 85 45 - 117 U/L    Bilirubin, Total 1.1 0.1 - 2.0 mg/dL    Total Protein 7.3 6.4 - 8.2 g/dL    Albumin 4.0 3.4 - 5.0 g/dL    Globulin  3.3 2.8 - 4.4 g/dL    A/G Ratio 1.2 1.0 - 2.0    Patient Fasting for CMP? Yes      *Note: Due to a large number of results and/or encounters for the requested time period, some results have not been displayed. A complete set of results can be found in Results Review.         Imaging:  MRI cervical spine 12/28/2017  FINDINGS:   The cranio-cervical junction is unremarkable.     There is mild grade 1 anterolisthesis of C3 on C4. There is trace   retrolisthesis of C5 on C6.     There is normal height and signal intensity of the vertebral bodies.     There is moderate loss of C5-6 and C6-7 intervertebral disc height   and signal. There is moderate loss of C3-4  intervertebral disc height   anteriorly.     There is normal signal intensity of the cervical cord.     A 2.4 cm T2 hyperintense signal abnormality in the superficial lobe   of the right parotid gland cannot be fully assessed on the current   study.     At C2-C3: There is very minimal right neural foraminal stenosis. The   left neural foramen and central spinal canal are widely patent.     At C3-C4: A disc osteophyte complex deforms the ventral cord and   causes moderate to severe central spinal narrowing. No associated   cord signal abnormality. There is severe left and moderate/severe   right neural foraminal stenosis.     At C4-C5: A small disc osteophyte complex is seen without cord   deformity. There is severe left and moderate/severe right neural   foraminal stenosis     At C5-C6:  A disc osteophyte complex deforms the cord and causes   markedly severe central spinal narrowing. Severe bilateral neural   foraminal stenosis is more advanced on the left     At C6-C7: A disc osteophyte complex deforms the right anterior cord   and causes severe central spinal narrowing. Severe bilateral neural   foraminal stenosis is present.       At C7-T1:  A small disc osteophyte complex is seen without cord   deformity. Severe left and mild to moderate right neural foraminal   stenosis       IMPRESSION:     A 2.4 cm T2 hyperintense signal abnormality in the superficial lobe   of the right parotid gland cannot be fully assessed on the current   study. Dedicated CT soft tissue neck with contrast would be helpful   for further assessment.     Multilevel spondylosis is most advanced at C3-4, C5-6 and C6-7. No   abnormal cord signal.     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  Worsening over the last 2 weeks concerning for symptoms of acute bacterial rhinosinusitis.  -We will check a chest x-ray given the duration of cough  - Trial of flonase 1 spray each nostril until symptoms improve  - Trial of antihistamine Zyrtec/Claritin/Allegra  - Antibiotics: augmentin 1 tablet twice a day for 10 days   -Pepcid 1 tablet twice a day for 2 weeks even if symptoms improve  - Tessalon Perles 1 tablet 3 times a day as needed    Neck pain  Cervical radiculopathy  Inciting event was bowling last Thursday, then acute onset the next day.  Exam notable for: Spurling test, remains neurologically intact  - Since last visit, this has improved.  There may be some involvement of the right hand with some numbness and tingling  - Physical therapy will also help open up these areas    Psoriasis versus dermatitis  Localized specifically to knuckles of digits 2, 3, 4.  - Continue with home moisturizing cream 1-2 times a day especially after showering  - Trial of triamcinolone 1 application twice a day for 7 days    Ventral hernia  Only with recumbency, some enlargement but otherwise asymptomatic  - We will clinically monitor for now  - If changes in bowel habits, GI symptoms occur within require surgical intervention.    Left thigh meralgia paresthetica  Intermittent burning sensation of the left thigh.  Possibly related to cervical radiculopathy, versus meralgia paresthetica  - Provided exercises to perform at home for both cervical radiculopathy and meralgia paresthetica  - We will monitor     Coronary artery disease  Recent stress test normal  - On aspirin, losartan, statin  -Established with cardiology, Dr. Marion     HTN  - BP slightly elevated, possibly due to pain  - Pain management as above  - We will continue current management for now and monitor over time       Orders This Visit:  No orders of the defined types were placed in this encounter.      Meds This  Visit:  Requested Prescriptions      No prescriptions requested or ordered in this encounter       Imaging & Referrals:  None       Health Maintenance  Return to clinic in 5-6 months for annual physical examination    Spent 30 minutes obtaining history, evaluating patient, discussing treatment options, diet, exercise, review of available labs and radiology reports, and completing documentation.     Bobby Swift MD, 01/16/24, 2:05 PM

## 2024-01-16 NOTE — PATIENT INSTRUCTIONS
You are seen in clinic today for evaluation of sinus congestion, cough.  Based on examination, this may be related to acute bacterial rhinosinusitis with an element of seasonal allergies that could have precipitated the symptoms.  Very mild allergies can result in postnasal drip, nasal leakage that could trigger the cough reflex.  Similarly acid reflux can work from the bottom up with micro droplets that can trigger the cough reflex    We recommended:  -We will start with a chest x-ray to rule out for intrapulmonary causes  - Trial of flonase 1 spray each nostril until symptoms improve with regards to nasal congestion  - Trial of antihistamine Zyrtec/Claritin/Allegra once a day  - Antibiotics: augmentin 1 tablet twice a day for 10 days  - Tessalon Perles 1 capsule 3 times a day as needed for cough.  You may also use Mucinex/Robitussin as needed    Please also consistently take Pepcid/famotidine 1 tablet twice a day for 14 days even if symptoms improve.  Take to completion for 2-week trial then use on an as-needed basis    Notify us if still no improvement of your cough, we can consider alternative medication    Return to clinic in 5-6 months for annual physical examination

## 2024-01-16 NOTE — TELEPHONE ENCOUNTER
Please notify the patient that the CXR thankfully that is normal. No changes to our plan today - will try to control the congestion and possible GERD symptoms as discussed

## 2024-02-12 NOTE — TELEPHONE ENCOUNTER
Pt spouse called regarding   He is not feeling well  Extremely bad sore throat, fever 100.6, has been taking Advil/Tylenol  Went for a COVID test this morning, will not have results for several days  Could this be strep?   Tasked to nursing Your current Orthopaedic problem we are working together to treat is:  left shoulder .    CARE:  May consider continued HEP/PT.   Can consider NSAIDs/steroid inj.   Surgical evaluation if not improving with conservative.   Finally, may continue to give it time.   May call in 4 weeks and if still doing well may close case.   If worsening, call for procedure visit for left GH joint inj with US.   Work status: May continue to work without restrictions. If symptoms change, may call for update.     It is recommended you schedule a follow-up appointment with Sohail Phillips MD in As needed.      Office hours are 8:00 am to 5:00 pm Monday through Friday. If it is urgent that you speak with someone outside of these hours, our [facility] Call Center will be able to assist you. You can reach the office by calling the [facility] at [phone], [place] at [phone], or [place] Office at [phone].     You will receive results for imaging, labs, and other studies through the [email or web address]. While the results will occur as quickly as they are finalized, a note or comment from our office will be sent within 72 hours of the study. Thank you for your patience with the delivery of this information. If you need to receive information sooner for a pressing matter, or have further questions, please call to discuss.    If you have concerns that need to be addressed quickly and are told that it will be weeks or longer to obtain an appointment, please ask to speak directly to my clinic staff to assistance. Alternatively, you may send a message through the [email or web address] directly to the physician, nurse, or .  This should allow us to triage and likely expedite addressing your concerns.    We do highly recommend [facility], if you do not already have this. You can request access via the internet or by simply talking with a  at any of the clinics.    [email or web address].      Thank you for choosing [facility] as your Orthopedic provider!

## 2024-07-08 ENCOUNTER — TELEPHONE (OUTPATIENT)
Dept: INTERNAL MEDICINE CLINIC | Facility: CLINIC | Age: 59
End: 2024-07-08

## 2024-07-08 NOTE — TELEPHONE ENCOUNTER
Patient sent a my chart email request for an appointment with   Dr. Swift for:     Would prefer Wednesday morning July 10 or Friday morning, July 12 if available; otherwise, please let me know other days with available appointments. Thanks, Moises Shah

## 2024-07-10 NOTE — TELEPHONE ENCOUNTER
Unfortunately no availability this week.  If Fridays are preferred:    Friday 7/19 - 12  7:30 am    Otherwise can schedule 7/22 or 7/23

## 2024-07-22 NOTE — PATIENT INSTRUCTIONS
- You were seen in clinic for regular annual check-up. We have ordered labs for you and we will call you with the results. Please obtain the bloodwork fasting for 10**12 hours. OK to drink water the day of your blood draw.      We have the lab downstairs on the first floor.  No appointment is necessary.  Lab hours are Monday-Friday 7:30 AM to 4:45 PM.  There may be Saturday hours 7 AM-11:00 AM as well.     Otherwise you can obtain the blood tests on the weekends at the main hospital or local immediate care/urgent care within the [facility].    -We did review your prior x-ray, no significant abnormality.  We should continue with your management of cough:    - Trial of flonase 1 spray each nostril until symptoms improve  - Trial of antihistamine Zyrtec/Claritin/Allegra  -Notify us of the cough is persistent as we can try alternative medications specifically for allergies.    Please notify patient that we will consider a trial of Wegovy/Mounjaro/Zep bound    This is a once a week injection that will help improve the metabolism of sugar levels as well as help target weight loss over time.  This is in addition to optimizing nutrition, exercise as the medication will have no effect without these lifestyle changes in place.    Common side effects include upset stomach, diarrhea, constipation.  In rare cases inflammation of the pancreas can occur.  We have to be careful in patients with family history of thyroid cancer/parathyroid cancer/adrenal gland cancer consistent with a syndrome called M EN 2.  Though rare, this medication should not be used in patients with such family history.    Insurance coverage is pending, there may still be a high co-pay if this medication is covered.  We will start a low-dose, and should check in through MyChart with us in 3 weeks for any side effects and how the medication is working.  Will try to maximize the dosage of the medication as tolerated.    An alternative medications  phentermine 15 mg once a day.  This medication is a stimulant that decreases appetite and can help promote weight loss.  I have to be careful as this can have stimulant effects of the body including chest pain, palpitations, insomnia, anxiety.      Medication is only as good as our target weight loss with good nutrition and exercise.  Lets make sure we have strong foundation Of these lifestyle changes prior to starting medication.    Lets first try fluocinolone 1 application thin layer to the right eyebrow and right leg area.  - You may use this 1 application twice a day for 7 days, if these clear up sooner you may discontinue the medication sooner  - If still no improvement, consider dermatology evaluation    -Monitor for any changes of your arthritis pains  -Please continue checking your blood pressures at home and notify us if they are increasing.  They were borderline elevated on our visit  - Please continue following up with Dr. Marion with cardiology  - Next colonoscopy may be due in 2026  - Vaccines may be due for: Prevnar 20, Tdap/tetanus shot, COVID dose #4  - Please continue to eat a varied diet including recommended servings of vegetables, fruits, and low fat dairy. Minimize high saturated fats (such as fast foods) and high sugar intake (such as soda)  - We recommend 150 minutes of moderate intensity exercise (brisk walking, swimming) weekly to maintain your current weight.  Targeted weight loss will require more vigorous exercise or more than 150 minutes/week.    Return to clinic in 6 months for follow-up

## 2024-07-22 NOTE — H&P
Elias Shah is a 58 year old male.    HPI:     Chief Complaint   Patient presents with    Physical     ANNUAL PHYSICAL       Mr. Shah is a 58 year old male coming in for annual physical examination.    Since the last visit, the cough 2-3 times a day. Worse with the morning. May be due to post nasal drip. The mucus that comes up is clear or greenish tint at times. No cough in the night. No wheezing. Some acid reflux - pin point.     No stress, nor anxiety. Normal work stress. Sleep 5-6 hours, nocturia and night. 7-8 hours. Of sleep on weekends. 6-7 am wake up, sleeps about 930-1030.     2 weeks of a lump in the R eyelid. No inciting. No pain but a little itchy.     R leg bump, has had for a year. Never noticed it. It was itchy 3-4 weeks back. Scratching the the area    I did review patient's past medical history, surgical history, family history, social history and updated as below.    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 Veggies - salads, grean beans, smoothies in the morning. ; fruits - bananas, apples. Water is about 150 oz  - Physical Activity: Not as much, 1-2x a week. Golfing 8-10 hours    HISTORY:  Past Medical History:    Cervical radiculopathy    Essential hypertension    Hyperlipidemia      Past Surgical History:   Procedure Laterality Date    Colonoscopy  5/2015    Other surgical history  02/10/2018    tumor removal carotid    Vasectomy  2002      Family History   Problem Relation Age of Onset    Cancer Father         bladder, prostate    Heart Disorder Father         MI at age 40    Cancer Mother 81        lung, smoker    Other (hypothyroidism) Mother     Cancer Sister     Other (Rheumatoid Arthritis) Sister     Hypertension Brother     Stroke Brother         age 56 (second stroke)    Heart Attack  Paternal Grandfather         age 50      Social History:   Social History     Socioeconomic History    Marital status:      Spouse name: Juana     Number of children: 2   Occupational History    Occupation:     Tobacco Use    Smoking status: Never     Passive exposure: Never    Smokeless tobacco: Never    Tobacco comments:     may have a cigar once or twice a year   Vaping Use    Vaping status: Never Used   Substance and Sexual Activity    Alcohol use: Yes     Alcohol/week: 9.0 standard drinks of alcohol     Types: 5 Glasses of wine, 4 Cans of beer per week     Comment: usually have a glass or two of wine at night    Drug use: No        Medications (Active prior to today's visit):  Current Outpatient Medications   Medication Sig Dispense Refill    fluocinolone 0.01 % External Cream 1 thin application to the affected areas twice a day as needed. 60 g 0    atorvastatin 40 MG Oral Tab TAKE 1 TABLET BY MOUTH EVERY DAY 90 tablet 3    aspirin (ASPIR-LOW) 81 MG Oral Tab EC Take 1 tablet (81 mg total) by mouth daily. 30 tablet 11    Losartan Potassium 25 MG Oral Tab Take 1 tablet (25 mg total) by mouth daily. 30 tablet 5    benzonatate 200 MG Oral Cap Take 1 capsule (200 mg total) by mouth 3 (three) times daily as needed for cough. (Patient not taking: Reported on 7/23/2024) 30 capsule 0    triamcinolone 0.1 % External Cream Apply topically 2 (two) times daily as needed. (Patient not taking: Reported on 7/23/2024) 60 g 3    cyclobenzaprine 5 MG Oral Tab Take 1 tablet (5 mg total) by mouth 3 (three) times daily as needed for Muscle spasms. (Patient not taking: Reported on 7/23/2024) 60 tablet 1       Allergies:  No Known Allergies      ROS:   Positive Findings indicated in BOLD    Constitutional: Fever, Chills, Weight Gain, Weight Loss, Night Sweats, Fatigue, Malaise  ENT/Mouth:  Hearing Changes, Ear Pain, Nasal Congestion, Sinus Pain, Hoarseness, Sore throat, Rhinorrhea, Swallowing Difficulty  Eyes:  Eye Pain, Swelling, Redness, Foreign Body, Discharge, Vision Changes  Cardiovascular: Chest Pain, SOB, PND, Dyspnea on Exertion, Orthopnea, Claudication, Edema, Palpitations  Respiratory: Cough, Sputum, Wheezing, Shortness of breath  Gastrointestinal: Nausea, Vomiting, Diarrhea, Constipation, Pain, Heartburn, Dysphagia, Bloody stools, Tarry stools  Genitourinary: Dysmenorrhea, Dysuria, Urinary Frequency, Hematuria, Urinary Incontinence, Urgency,  Flank Pain  Musculoskeletal: Arthralgias, Myalgias, Joint Swelling, Joint Stiffness, Back Pain, Neck Pain,   Integumentary: Skin Lesions, Pruritis, Hair Changes, Jaundice, Nail changes  Neuro: Weakness, Numbness, Paresthesias, Loss of Consciousness, Syncope, Dizziness, Headache, Falls  Psych: Anxiety, Depression, Insomnia, Suicidal Ideation, Homicidal ideation, Memory Changes  Heme/Lymph: Bruising, Bleeding, Lymphadenopathy  Endocrine: Polyuria, Polydipsia, Temperature Intolerance    PHYSICAL EXAM:   Vital Signs:  Blood pressure 140/82, pulse 90, temperature 97.7 °F (36.5 °C), temperature source Oral, resp. rate 18, height 5' 11\" (1.803 m), weight 263 lb (119.3 kg), SpO2 97%.     Constitutional: No acute distress. Alert and oriented x 3.  Eyes: EOMI, PERRLA, clear sclera b/l  HENT: NCAT, Moist mucous membranes, Oropharynx without erythema or exudates  Neck: No JVD, no thyromegaly  Cardiovascular: S1, S2, no S3, no S4, Regular rate and rhythm, No murmurs/gallops/rubs.   Vascular: Equal pulses 2+ carotids without bruits or thrills/DP/PT  Respiratory: Clear to auscultation bilaterally.  No wheezes/rales/rhonchi  Gastrointestinal: Soft, nontender, nondistended. Positive bowel sounds x 4. No rebound tenderness. No hepatomegaly, No splenomegaly  Genitourinary: No CVA tenderness bilaterally  + Internal hemorrhoids noted, no significant BPH  Neurologic: No focal neurological deficits, CN II-XII intact, light touch intact, MSK Strength 5/5 and symmetric in all extremities, normal  gait  Musculoskeletal: Full range of motion of all extremities, no clubbing/swelling/edema; + crepitus bilateral knees  + Ventral hernia noted with recumbency, reducible spontaneously  Skin: + Dry scaly rash on the right eyebrow + right lower extremity with brown-tinged flat lesion.  Psychiatric: Appropriate mood and affect  Heme/Lymph/Immune: No cervical/inguinal LAD      DATA REVIEWED   Labs:  No results found for this or any previous visit (from the past 8760 hour(s)).    No results found for this or any previous visit (from the past 8760 hour(s)).      Imaging:  MRI cervical spine 12/28/2017  FINDINGS:   The cranio-cervical junction is unremarkable.     There is mild grade 1 anterolisthesis of C3 on C4. There is trace   retrolisthesis of C5 on C6.     There is normal height and signal intensity of the vertebral bodies.     There is moderate loss of C5-6 and C6-7 intervertebral disc height   and signal. There is moderate loss of C3-4 intervertebral disc height   anteriorly.     There is normal signal intensity of the cervical cord.     A 2.4 cm T2 hyperintense signal abnormality in the superficial lobe   of the right parotid gland cannot be fully assessed on the current   study.     At C2-C3: There is very minimal right neural foraminal stenosis. The   left neural foramen and central spinal canal are widely patent.     At C3-C4: A disc osteophyte complex deforms the ventral cord and   causes moderate to severe central spinal narrowing. No associated   cord signal abnormality. There is severe left and moderate/severe   right neural foraminal stenosis.     At C4-C5: A small disc osteophyte complex is seen without cord   deformity. There is severe left and moderate/severe right neural   foraminal stenosis     At C5-C6:  A disc osteophyte complex deforms the cord and causes   markedly severe central spinal narrowing. Severe bilateral neural   foraminal stenosis is more advanced on the left     At C6-C7: A disc  osteophyte complex deforms the right anterior cord   and causes severe central spinal narrowing. Severe bilateral neural   foraminal stenosis is present.       At C7-T1:  A small disc osteophyte complex is seen without cord   deformity. Severe left and mild to moderate right neural foraminal   stenosis       IMPRESSION:     A 2.4 cm T2 hyperintense signal abnormality in the superficial lobe   of the right parotid gland cannot be fully assessed on the current   study. Dedicated CT soft tissue neck with contrast would be helpful   for further assessment.     Multilevel spondylosis is most advanced at C3-4, C5-6 and C6-7. No   abnormal cord signal.     X-rays 7/19/2023  Right hip    Impression   CONCLUSION:  1. No acute fracture dislocation.  2. Moderate right hip osteoarthritis has progressed.  Mild left hip degenerative changes..       Right knee    Impression   CONCLUSION:  1. No acute fracture dislocation.  2. Mild tricompartment osteoarthritis.          Chest x-ray 1/16/2024    Impression   CONCLUSION: No acute cardiopulmonary disease.          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  Worsening over the last 2 weeks concerning for symptoms of acute bacterial rhinosinusitis.  -Chest x-ray without significant abnormality  - Trial of flonase 1 spray each nostril until symptoms improve  - Trial of antihistamine Zyrtec/Claritin/Allegra  -Seems to be improved without evidence of infection.  Will consider alternative medication such as Singulair if allergic rhinitis is the predominant cause.    Skin lesions  - Dry scaly papule on the right eyebrow  -Right lower extremity with brown flat patch  -Suspect possible dermatitis we will try fluocinolone that is  safe in the right eyebrow area 1 application twice a day for 7 days.  Can similarly try on the right lower extremity  - If no improvement, should see dermatology, Dr. Hoover     Neck pain  Cervical radiculopathy  Inciting event was bowling last Thursday, then acute onset the next day.  Exam notable for: Spurling test, remains neurologically intact  - Since last visit, this has improved.  There may be some involvement of the right hand with some numbness and tingling  - Physical therapy will also help open up these areas     Psoriasis versus dermatitis  Localized specifically to knuckles of digits 2, 3, 4.  - Continue with home moisturizing cream 1-2 times a day especially after showering  - Trial of triamcinolone 1 application twice a day for 7 days     Ventral hernia  Only with recumbency, some enlargement but otherwise asymptomatic  - We will clinically monitor for now  - If changes in bowel habits, GI symptoms occur within require surgical intervention.     Left thigh meralgia paresthetica  Intermittent burning sensation of the left thigh.  Possibly related to cervical radiculopathy, versus meralgia paresthetica  - Provided exercises to perform at home for both cervical radiculopathy and meralgia paresthetica  - We will monitor     Coronary artery disease  .  Calcium score 504 elevated.   - On aspirin, losartan, statin  -Established with cardiology, Dr. Marion, most recent visit 3/5/2024.  Seems to be stable.  Follow-up in 12 months     HTN  - BP slightly elevated, possibly due to pain  - Pain management as above  - We will continue current management for now and monitor over time    Weight management  Concern for weight gain without other associated symptoms concerning for secondary causes.     Wt Readings from Last 6 Encounters:   07/23/24 263 lb (119.3 kg)   01/16/24 263 lb (119.3 kg)   06/27/23 257 lb (116.6 kg)   05/11/22 251 lb (113.9 kg)   03/30/22 244 lb (110.7 kg)   03/20/22 250 lb (113.4 kg)          We  will obtain fasting blood work to rule out secondary causes, though low suspicion for secondary cause     We discussed the importance of net caloric reduction by:  -Optimizing nutrition.  Would benefit to focus on high-fiber intake with fruits, vegetables.  Opt for leaner meats such as chicken/fish/turkey/pork, baked rather than fried/use of high oil content.  Low-fat dairy can be incorporated.  - The goal for nutrition is to gradually decrease calories per day.  We discussed other methods to achieve this such as decreasing excessive snacking, decreasing portions, decrease the frequency of eating out.  - Weight gain is best obtained by modifying food intake.  However, exercise can help achieve caloric reduction by burning off calories.  Recommend at least 150 minutes of moderate intensity exercise for weight maintenance.  Higher intensity exercise for longer periods of time can also promote weight loss   -These lifestyle changes should be viewed as long-term even with weight loss.  This promotes overall general health and decrease risk for chronic diseases in the future.  -We will attempt a trial with conservative measures within 3 months.  If no improvement, we can consider medications or possibly referral to a specialist in the future         Orders This Visit:  Orders Placed This Encounter   Procedures    CBC With Differential With Platelet    Comp Metabolic Panel (14)    Lipid Panel    PSA Total, Screen    TSH W Reflex To Free T4    Urinalysis with Culture Reflex       Meds This Visit:  Requested Prescriptions     Signed Prescriptions Disp Refills    fluocinolone 0.01 % External Cream 60 g 0     Si thin application to the affected areas twice a day as needed.       Imaging & Referrals:  [facility] - INTERNAL       Health Maintenance  HTN Screen: BP at goal  DM Screen: Check fasting sugar  HLD Screen: Check fasting lipid panel  HCV Screen: Considered low risk  HIV Screen: considered low risk  G/C/Syphilis:  Considered low risk    Colon Cancer Screening (45-70): Last colonoscopy reported to be 8/2016, - due in 2026  Prostate Cancer Screening: (50-70): Check PSA   Lung Cancer Screening (55-79 with 30 p/year and active < 15 years): Not indicated  AAA Screening (65-75 Hx of smoking): Not indicated    Influenza: Annually - utd  Td/Tdap: Last Tdap 2012, due   Zoster (60+): Recommended 2 doses Shringrix, should check with insurance for coverage and obtain at the pharmacy.  HPV (19-26): Not indicated  Pneumococcal: Due for Prevnar 20    Immunization History   Administered Date(s) Administered    Covid-19 Vaccine Moderna 100 mcg/0.5 ml 04/04/2021, 05/02/2021    Covid-19 Vaccine Moderna Bivalent 50mcg/0.5mL 12+ years 11/14/2022    FLUZONE 6 months and older PFS 0.5 ml (06391) 09/26/2018, 10/26/2019, 09/30/2020, 10/30/2021, 11/09/2022, 10/25/2023    Flucelvax 0.5ml Vaccine 10/04/2017    Fluvirin, 3 Years & >, Im 12/11/2012, 12/18/2013, 10/05/2017    Influenza 01/05/2013, 10/23/2019    Moderna Covid-19 Vaccine 50mcg/0.5ml 12yrs+ (2616-6356) 10/25/2023    TDAP 12/11/2012     Return to clinic in 1 year for annual physical examination    Bobby Swift MD, 07/23/24, 3:00 PM

## 2024-07-23 ENCOUNTER — OFFICE VISIT (OUTPATIENT)
Dept: INTERNAL MEDICINE CLINIC | Facility: CLINIC | Age: 59
End: 2024-07-23

## 2024-07-23 VITALS
RESPIRATION RATE: 18 BRPM | WEIGHT: 263 LBS | SYSTOLIC BLOOD PRESSURE: 140 MMHG | HEART RATE: 90 BPM | HEIGHT: 71 IN | OXYGEN SATURATION: 97 % | DIASTOLIC BLOOD PRESSURE: 82 MMHG | TEMPERATURE: 98 F | BODY MASS INDEX: 36.82 KG/M2

## 2024-07-23 DIAGNOSIS — L98.9 SKIN LESION: ICD-10-CM

## 2024-07-23 DIAGNOSIS — Z13.1 SCREENING FOR DIABETES MELLITUS: ICD-10-CM

## 2024-07-23 DIAGNOSIS — M16.0 PRIMARY OSTEOARTHRITIS OF BOTH HIPS: ICD-10-CM

## 2024-07-23 DIAGNOSIS — Z12.5 SCREENING FOR PROSTATE CANCER: ICD-10-CM

## 2024-07-23 DIAGNOSIS — I25.10 CORONARY ARTERY DISEASE INVOLVING NATIVE CORONARY ARTERY OF NATIVE HEART WITHOUT ANGINA PECTORIS: ICD-10-CM

## 2024-07-23 DIAGNOSIS — M54.12 CERVICAL RADICULOPATHY: ICD-10-CM

## 2024-07-23 DIAGNOSIS — I10 HTN (HYPERTENSION), BENIGN: ICD-10-CM

## 2024-07-23 DIAGNOSIS — Z00.00 ANNUAL PHYSICAL EXAM: Primary | ICD-10-CM

## 2024-07-23 DIAGNOSIS — R05.3 CHRONIC COUGH: ICD-10-CM

## 2024-07-23 DIAGNOSIS — M17.10 ARTHRITIS OF KNEE: ICD-10-CM

## 2024-07-23 DIAGNOSIS — Z13.29 SCREENING FOR THYROID DISORDER: ICD-10-CM

## 2024-07-23 DIAGNOSIS — Z80.52 FAMILY HISTORY OF BLADDER CANCER: ICD-10-CM

## 2024-07-23 DIAGNOSIS — E78.5 DYSLIPIDEMIA: ICD-10-CM

## 2024-07-23 DIAGNOSIS — Z13.0 SCREENING FOR DEFICIENCY ANEMIA: ICD-10-CM

## 2024-07-23 PROCEDURE — 99396 PREV VISIT EST AGE 40-64: CPT | Performed by: INTERNAL MEDICINE

## 2024-08-02 ENCOUNTER — LAB ENCOUNTER (OUTPATIENT)
Dept: LAB | Age: 59
End: 2024-08-02
Attending: INTERNAL MEDICINE
Payer: COMMERCIAL

## 2024-08-02 DIAGNOSIS — E78.5 DYSLIPIDEMIA: ICD-10-CM

## 2024-08-02 DIAGNOSIS — Z13.0 SCREENING FOR DEFICIENCY ANEMIA: ICD-10-CM

## 2024-08-02 DIAGNOSIS — Z13.29 SCREENING FOR THYROID DISORDER: ICD-10-CM

## 2024-08-02 DIAGNOSIS — Z00.00 ANNUAL PHYSICAL EXAM: ICD-10-CM

## 2024-08-02 DIAGNOSIS — Z80.52 FAMILY HISTORY OF BLADDER CANCER: ICD-10-CM

## 2024-08-02 DIAGNOSIS — Z12.5 SCREENING FOR PROSTATE CANCER: ICD-10-CM

## 2024-08-02 LAB
ALBUMIN SERPL-MCNC: 4.7 G/DL (ref 3.2–4.8)
ALBUMIN/GLOB SERPL: 1.7 {RATIO} (ref 1–2)
ALP LIVER SERPL-CCNC: 96 U/L
ALT SERPL-CCNC: 47 U/L
ANION GAP SERPL CALC-SCNC: 4 MMOL/L (ref 0–18)
AST SERPL-CCNC: 25 U/L (ref ?–34)
BASOPHILS # BLD AUTO: 0.07 X10(3) UL (ref 0–0.2)
BASOPHILS NFR BLD AUTO: 1 %
BILIRUB SERPL-MCNC: 1.3 MG/DL (ref 0.3–1.2)
BILIRUB UR QL: NEGATIVE
BUN BLD-MCNC: 12 MG/DL (ref 9–23)
BUN/CREAT SERPL: 10.8 (ref 10–20)
CALCIUM BLD-MCNC: 10.3 MG/DL (ref 8.7–10.4)
CHLORIDE SERPL-SCNC: 105 MMOL/L (ref 98–112)
CHOLEST SERPL-MCNC: 191 MG/DL (ref ?–200)
CLARITY UR: CLEAR
CO2 SERPL-SCNC: 29 MMOL/L (ref 21–32)
COMPLEXED PSA SERPL-MCNC: 1.79 NG/ML (ref ?–4)
CREAT BLD-MCNC: 1.11 MG/DL
DEPRECATED RDW RBC AUTO: 41.5 FL (ref 35.1–46.3)
EGFRCR SERPLBLD CKD-EPI 2021: 77 ML/MIN/1.73M2 (ref 60–?)
EOSINOPHIL # BLD AUTO: 0.32 X10(3) UL (ref 0–0.7)
EOSINOPHIL NFR BLD AUTO: 4.6 %
ERYTHROCYTE [DISTWIDTH] IN BLOOD BY AUTOMATED COUNT: 12.4 % (ref 11–15)
FASTING PATIENT LIPID ANSWER: YES
FASTING STATUS PATIENT QL REPORTED: YES
GLOBULIN PLAS-MCNC: 2.8 G/DL (ref 2–3.5)
GLUCOSE BLD-MCNC: 103 MG/DL (ref 70–99)
GLUCOSE UR-MCNC: NORMAL MG/DL
HCT VFR BLD AUTO: 47.2 %
HDLC SERPL-MCNC: 48 MG/DL (ref 40–59)
HGB BLD-MCNC: 16.3 G/DL
HGB UR QL STRIP.AUTO: NEGATIVE
IMM GRANULOCYTES # BLD AUTO: 0.03 X10(3) UL (ref 0–1)
IMM GRANULOCYTES NFR BLD: 0.4 %
KETONES UR-MCNC: NEGATIVE MG/DL
LDLC SERPL CALC-MCNC: 121 MG/DL (ref ?–100)
LEUKOCYTE ESTERASE UR QL STRIP.AUTO: NEGATIVE
LYMPHOCYTES # BLD AUTO: 2.04 X10(3) UL (ref 1–4)
LYMPHOCYTES NFR BLD AUTO: 29.3 %
MCH RBC QN AUTO: 31.7 PG (ref 26–34)
MCHC RBC AUTO-ENTMCNC: 34.5 G/DL (ref 31–37)
MCV RBC AUTO: 91.8 FL
MONOCYTES # BLD AUTO: 0.78 X10(3) UL (ref 0.1–1)
MONOCYTES NFR BLD AUTO: 11.2 %
NEUTROPHILS # BLD AUTO: 3.73 X10 (3) UL (ref 1.5–7.7)
NEUTROPHILS # BLD AUTO: 3.73 X10(3) UL (ref 1.5–7.7)
NEUTROPHILS NFR BLD AUTO: 53.5 %
NITRITE UR QL STRIP.AUTO: NEGATIVE
NONHDLC SERPL-MCNC: 143 MG/DL (ref ?–130)
OSMOLALITY SERPL CALC.SUM OF ELEC: 286 MOSM/KG (ref 275–295)
PH UR: 5.5 [PH] (ref 5–8)
PLATELET # BLD AUTO: 242 10(3)UL (ref 150–450)
POTASSIUM SERPL-SCNC: 4.3 MMOL/L (ref 3.5–5.1)
PROT SERPL-MCNC: 7.5 G/DL (ref 5.7–8.2)
PROT UR-MCNC: NEGATIVE MG/DL
RBC # BLD AUTO: 5.14 X10(6)UL
SODIUM SERPL-SCNC: 138 MMOL/L (ref 136–145)
SP GR UR STRIP: 1.01 (ref 1–1.03)
T4 FREE SERPL-MCNC: 1.1 NG/DL (ref 0.8–1.7)
TRIGL SERPL-MCNC: 122 MG/DL (ref 30–149)
TSI SER-ACNC: 5.87 MIU/ML (ref 0.55–4.78)
UROBILINOGEN UR STRIP-ACNC: NORMAL
VLDLC SERPL CALC-MCNC: 21 MG/DL (ref 0–30)
WBC # BLD AUTO: 7 X10(3) UL (ref 4–11)

## 2024-08-02 PROCEDURE — 84439 ASSAY OF FREE THYROXINE: CPT

## 2024-08-02 PROCEDURE — 36415 COLL VENOUS BLD VENIPUNCTURE: CPT

## 2024-08-02 PROCEDURE — 80061 LIPID PANEL: CPT

## 2024-08-02 PROCEDURE — 85025 COMPLETE CBC W/AUTO DIFF WBC: CPT

## 2024-08-02 PROCEDURE — 80053 COMPREHEN METABOLIC PANEL: CPT

## 2024-08-02 PROCEDURE — 81003 URINALYSIS AUTO W/O SCOPE: CPT

## 2024-08-02 PROCEDURE — 84443 ASSAY THYROID STIM HORMONE: CPT

## 2024-08-26 ENCOUNTER — TELEPHONE (OUTPATIENT)
Dept: INTERNAL MEDICINE CLINIC | Facility: CLINIC | Age: 59
End: 2024-08-26

## 2024-08-26 DIAGNOSIS — E03.9 HYPOTHYROIDISM, UNSPECIFIED TYPE: Primary | ICD-10-CM

## 2024-08-26 RX ORDER — LEVOTHYROXINE SODIUM 25 UG/1
25 TABLET ORAL
Qty: 90 TABLET | Refills: 3 | Status: SHIPPED | OUTPATIENT
Start: 2024-08-26

## 2024-08-26 NOTE — TELEPHONE ENCOUNTER
----- Message from [employer]  sent at 8/26/2024 11:29 AM CDT -----  Regarding: Test Results and potential Wegovy start date   Contact: [phone]  Hi Dr. Swift,     Just following up to see if you received approval for me to start the Wegovy?  Also, I just found out both my sister and brother are on medicine for hypothyroidism.  Apparently my mother had the same issue but I wasn't aware.  I know you stated we should wait on putting me on some medicine; however, I'm wondering if we should look into my starting something sooner or later.  For the past month or so my voice has been coming in and out, raspy.  When I looked up hypothyroidism it noted this is one of the symptoms.  Please let me know your thoughts.     ThanksMoises

## 2024-08-26 NOTE — TELEPHONE ENCOUNTER
Spoke to pt - we received a message back on the electronic PA that it should be processed manually.   I recommended to pt to call insurance to find out the 1-800# or have them fax directly to our office PA form

## 2024-08-26 NOTE — TELEPHONE ENCOUNTER
[employer] message sent, start levothyroxine with parameters placed.  Repeat levels in 6-8 weeks.    To Sandy, assistance with Wegovy.  He may be candidate as he does have establish heart disease with risk factors.  I have not heard anything about prior authorizations or denials to this point

## 2024-08-29 NOTE — TELEPHONE ENCOUNTER
Spoke to pt - we discussed mounjaro will not be covered unless DM;  pt did not check specifically if zepbound is covered;  he will reach out to insurance again and let me know what he finds out

## 2024-08-29 NOTE — TELEPHONE ENCOUNTER
[facility] faxing alternative request Mounjaro    Medication is on backorder prescribed strength is out of stock or higher strengths face supply issues.  Consider potential alternatives listed and evaluate based on patient's specific needs and tolerance    Placed in green folder

## 2024-12-04 ENCOUNTER — HOSPITAL ENCOUNTER (OUTPATIENT)
Age: 59
Discharge: HOME | End: 2024-12-04
Attending: EMERGENCY MEDICINE
Payer: COMMERCIAL

## 2024-12-04 ENCOUNTER — TELEPHONE (OUTPATIENT)
Dept: INTERNAL MEDICINE CLINIC | Facility: CLINIC | Age: 59
End: 2024-12-04

## 2024-12-04 ENCOUNTER — APPOINTMENT (OUTPATIENT)
Dept: GENERAL RADIOLOGY | Age: 59
End: 2024-12-04
Attending: EMERGENCY MEDICINE
Payer: COMMERCIAL

## 2024-12-04 VITALS
TEMPERATURE: 98 F | OXYGEN SATURATION: 96 % | HEART RATE: 95 BPM | DIASTOLIC BLOOD PRESSURE: 89 MMHG | RESPIRATION RATE: 20 BRPM | SYSTOLIC BLOOD PRESSURE: 149 MMHG

## 2024-12-04 DIAGNOSIS — J01.40 ACUTE NON-RECURRENT PANSINUSITIS: Primary | ICD-10-CM

## 2024-12-04 PROCEDURE — 71046 X-RAY EXAM CHEST 2 VIEWS: CPT | Performed by: EMERGENCY MEDICINE

## 2024-12-04 PROCEDURE — 99213 OFFICE O/P EST LOW 20 MIN: CPT

## 2024-12-04 PROCEDURE — 99214 OFFICE O/P EST MOD 30 MIN: CPT

## 2024-12-04 NOTE — ED INITIAL ASSESSMENT (HPI)
Patient reports hx of cough, sinus congestion x 1 month.  Reports worsening sinus pain and pressure over the last 5 days and worsening cough over the last 3 days. Hx of bronchitis in the past.  Denies fevers.  + body aches.

## 2024-12-04 NOTE — ED PROVIDER NOTES
Patient Seen in: [facility]      History     Chief Complaint   Patient presents with    Cough/URI     Stated Complaint: Head& Chest  Congested ,Cough /Xray    Subjective:   HPI      The patient is a 59-year-old male with past history of hypertension who presents now with nasal congestion/sinus pain and pressure, cough.  Patient states he has had sinus pain and pressure associated with cough for over a month.  However, over the last 5 days, the symptoms have worsened.  Patient describes worsening sinus pain and pressure associated with nasal discharge.  The patient has also had a mild cough.  The patient denies any fever.  Patient denies any COVID concerns.  Patient denies any chest pain.    Objective:     Past Medical History:    Cervical radiculopathy    Essential hypertension    Hyperlipidemia              Past Surgical History:   Procedure Laterality Date    Colonoscopy  5/2015    Other surgical history  02/10/2018    tumor removal carotid    Vasectomy  2002                Social History     Socioeconomic History    Marital status:      Spouse name: Juana     Number of children: 2   Occupational History    Occupation:     Tobacco Use    Smoking status: Never     Passive exposure: Never    Smokeless tobacco: Never    Tobacco comments:     may have a cigar once or twice a year   Vaping Use    Vaping status: Never Used   Substance and Sexual Activity    Alcohol use: Yes     Alcohol/week: 9.0 standard drinks of alcohol     Types: 5 Glasses of wine, 4 Cans of beer per week     Comment: usually have a glass or two of wine at night    Drug use: No              Review of Systems    Positive for stated complaint: Head& Chest  Congested ,Cough /Xray  Other systems are as noted in HPI.  Constitutional and vital signs reviewed.      All other systems reviewed and negative except as noted above.    Physical Exam     ED Triage Vitals [12/04/24 1419]   /89   Pulse 95   Resp 20   Temp  98.2 °F (36.8 °C)   Temp src Oral   SpO2 96 %   O2 Device None (Room air)       Current Vitals:   Vital Signs  BP: 149/89  Pulse: 95  Resp: 20  Temp: 98.2 °F (36.8 °C)  Temp src: Oral    Oxygen Therapy  SpO2: 96 %  O2 Device: None (Room air)        Physical Exam    Constitutional: Well-developed well-nourished in no acute distress  Head: Normocephalic, no swelling or tenderness  Eyes: Nonicteric sclera, no conjunctival injection  ENT: TMs are clear and flat bilaterally.  There is no posterior pharyngeal erythema  Chest: Clear to auscultation, no tenderness  Cardiovascular: Regular rate and rhythm without murmur  Abdomen: Soft, nontender and nondistended  Neurologic: Patient is awake, alert and oriented ×3.  The patient's motor strength is 5 out of 5 and symmetric in the upper and lower extremities bilaterally  Extremities: No focal swelling or tenderness  Skin: No pallor, no redness or warmth to the touch      ED Course   Labs Reviewed - No data to display  Patient's chest x-ray was independently reviewed by myself.  There is no acute infiltrate noted   Patient denies any COVID concerns.  Possibility of other viral etiologies was discussed.  Patient has had symptoms for almost a month which are now worsening.  He prefers treatment.  Will initiate Augmentin.       MDM      Viral URI versus COVID versus pneumonia versus sinusitis        Medical Decision Making      Disposition and Plan     Clinical Impression:  1. Acute non-recurrent pansinusitis         Disposition:  Discharge  12/4/2024  3:05 pm    Follow-up:  Bobby Swift MD  [address] [place] [id number]  [phone]    In 3 days  If symptoms worsen          Medications Prescribed:  Current Discharge Medication List        START taking these medications    Details   amoxicillin clavulanate 875-125 MG Oral Tab Take 1 tablet by mouth 2 (two) times daily for 10 days.  Qty: 20 tablet, Refills: 0                 Supplementary Documentation:

## 2024-12-04 NOTE — TELEPHONE ENCOUNTER
Patient called to schedule same day appointment.   Symptoms include below:  Shortness of Breath   Congestion in both head and chest  Deep Cough with mild white phlegm - has had cough for last couple of months but it is getting worse.  Body aches  Headache  Fatigue    No Fever, No chills, No sore throat, no loss of appetite  No Covid Test taken  Schedule appointment with Dr. Black today at 1:30pm   Patient advised to wear mask  Please confirm appointment is ok.

## 2024-12-04 NOTE — TELEPHONE ENCOUNTER
Called and spoke with patient. Per Dr. Black asked patient to go to UC today instead of the same day sick appointment. Patient agreeable to go to [facility] today.

## 2025-01-09 ENCOUNTER — OFFICE VISIT (OUTPATIENT)
Dept: INTERNAL MEDICINE CLINIC | Facility: CLINIC | Age: 60
End: 2025-01-09

## 2025-01-09 VITALS
HEART RATE: 90 BPM | BODY MASS INDEX: 37.66 KG/M2 | DIASTOLIC BLOOD PRESSURE: 84 MMHG | OXYGEN SATURATION: 95 % | HEIGHT: 71 IN | WEIGHT: 269 LBS | TEMPERATURE: 99 F | SYSTOLIC BLOOD PRESSURE: 152 MMHG

## 2025-01-09 DIAGNOSIS — G47.33 OSA (OBSTRUCTIVE SLEEP APNEA): ICD-10-CM

## 2025-01-09 DIAGNOSIS — R05.3 CHRONIC COUGH: ICD-10-CM

## 2025-01-09 DIAGNOSIS — E03.9 HYPOTHYROIDISM, UNSPECIFIED TYPE: ICD-10-CM

## 2025-01-09 DIAGNOSIS — I25.10 CORONARY ARTERY DISEASE INVOLVING NATIVE CORONARY ARTERY OF NATIVE HEART WITHOUT ANGINA PECTORIS: ICD-10-CM

## 2025-01-09 DIAGNOSIS — M54.50 MIDLINE LOW BACK PAIN, UNSPECIFIED CHRONICITY, UNSPECIFIED WHETHER SCIATICA PRESENT: Primary | ICD-10-CM

## 2025-01-09 DIAGNOSIS — E78.5 DYSLIPIDEMIA: ICD-10-CM

## 2025-01-09 DIAGNOSIS — M54.12 CERVICAL RADICULOPATHY: ICD-10-CM

## 2025-01-09 PROCEDURE — 99214 OFFICE O/P EST MOD 30 MIN: CPT | Performed by: INTERNAL MEDICINE

## 2025-01-09 RX ORDER — PREDNISONE 10 MG/1
TABLET ORAL
Qty: 30 TABLET | Refills: 0 | Status: SHIPPED | OUTPATIENT
Start: 2025-01-09 | End: 2025-01-21

## 2025-01-09 RX ORDER — ALBUTEROL SULFATE 90 UG/1
2 INHALANT RESPIRATORY (INHALATION) EVERY 6 HOURS PRN
Qty: 1 EACH | Refills: 1 | Status: SHIPPED | OUTPATIENT
Start: 2025-01-09

## 2025-01-09 RX ORDER — AZITHROMYCIN 250 MG/1
TABLET, FILM COATED ORAL
Qty: 6 TABLET | Refills: 0 | Status: SHIPPED | OUTPATIENT
Start: 2025-01-09 | End: 2025-01-14

## 2025-01-09 RX ORDER — CYCLOBENZAPRINE HCL 5 MG
5 TABLET ORAL 3 TIMES DAILY PRN
Qty: 30 TABLET | Refills: 0 | Status: SHIPPED | OUTPATIENT
Start: 2025-01-09

## 2025-01-09 NOTE — PROGRESS NOTES
Elias Shah is a 59 year old male.    HPI:     Chief Complaint   Patient presents with    Low Back Pain     Low back, buttocks and hamstrings  Shooting pain with movement.   Onset: 1.5  Pain is constant.   8-9/10 (at it's worse)  Otc: Advil 200 mg 4 tablets BID. Icy hot or biofreeze   Tried heat  No fall or injury.      Mr. Shah is a 59 year old male past medical history of hypertension, hyperlipidemia, cervical radiculopathy who presents today for back pain.    Back pain started 2nd week in December with stiffness. Got sick Thanksgiving weekend. He has had this cough around the time. He was seen in the Urgent care 12/4. Treated with Augmentin, Robitussin. The back has tightened up. Was taking some stretches. Worsened the last few days    He is still having cough, harsh. A little mucus though not consistent. Glass of milk. OK with water. Mucus is clear.    Also with snoring, apnea episodes.  Wife has been noticing this as well.    No diarrhea, fevers, chills. He has been stuffy lately.     He was also volunteering, he did have an out door golf outing.    Has been taking Advil, heating pad. Used voltaren gel x 3-4 days. This helped.  Has had a prior back flareup in the past.    HISTORY:  Past Medical History:    Cervical radiculopathy    Essential hypertension    Hyperlipidemia      Past Surgical History:   Procedure Laterality Date    Colonoscopy  5/2015    Other surgical history  02/10/2018    tumor removal carotid    Vasectomy  2002      Family History   Problem Relation Age of Onset    Cancer Father         bladder, prostate    Heart Disorder Father         MI at age 40    Cancer Mother 81        lung, smoker    Other (hypothyroidism) Mother     Cancer Sister     Other (Rheumatoid Arthritis) Sister     Hypertension Brother     Stroke Brother         age 56 (second stroke)    Heart Attack Paternal Grandfather         age 50      Social History:   Social History     Socioeconomic History    Marital status:       Spouse name: Juana     Number of children: 2   Occupational History    Occupation:     Tobacco Use    Smoking status: Never     Passive exposure: Never    Smokeless tobacco: Never    Tobacco comments:     may have a cigar once or twice a year   Vaping Use    Vaping status: Never Used   Substance and Sexual Activity    Alcohol use: Yes     Alcohol/week: 6.0 standard drinks of alcohol     Types: 5 Glasses of wine, 1 Cans of beer per week     Comment: Have cut back on alcohol    Drug use: No        Medications (Active prior to today's visit):  Current Outpatient Medications   Medication Sig Dispense Refill    levothyroxine 25 MCG Oral Tab Take 1 tablet (25 mcg total) by mouth before breakfast. 90 tablet 3    fluocinolone 0.01 % External Cream 1 thin application to the affected areas twice a day as needed. 60 g 0    atorvastatin 40 MG Oral Tab TAKE 1 TABLET BY MOUTH EVERY DAY 90 tablet 3    triamcinolone 0.1 % External Cream Apply topically 2 (two) times daily as needed. 60 g 3    aspirin (ASPIR-LOW) 81 MG Oral Tab EC Take 1 tablet (81 mg total) by mouth daily. 30 tablet 11    Losartan Potassium 25 MG Oral Tab Take 1 tablet (25 mg total) by mouth daily. 30 tablet 5       Allergies:  No Known Allergies      ROS:   Positive Findings indicated in BOLD    Constitutional: Fever, Chills, Weight Gain, Weight Loss, Night Sweats, Fatigue, Malaise  ENT/Mouth:  Hearing Changes, Ear Pain, Nasal Congestion, Sinus Pain, Hoarseness, Sore throat, Rhinorrhea, Swallowing Difficulty  Eyes: Eye Pain, Swelling, Redness, Foreign Body, Discharge, Vision Changes  Cardiovascular: Chest Pain, SOB, PND, Dyspnea on Exertion, Orthopnea, Claudication, Edema, Palpitations  Respiratory: Cough, Sputum, Wheezing, Shortness of breath  Gastrointestinal: Nausea, Vomiting, Diarrhea, Constipation, Pain, Heartburn, Dysphagia, Bloody stools, Tarry stools  Genitourinary: Dysmenorrhea, Dysuria, Urinary Frequency, Hematuria, Urinary  Incontinence, Urgency,  Flank Pain  Musculoskeletal: Arthralgias, Myalgias, Joint Swelling, Joint Stiffness, Back Pain, Neck Pain,   Integumentary: Skin Lesions, Pruritis, Hair Changes, Jaundice, Nail changes  Neuro: Weakness, Numbness, Paresthesias, Loss of Consciousness, Syncope, Dizziness, Headache, Falls  Psych: Anxiety, Depression, Insomnia, Suicidal Ideation, Homicidal ideation, Memory Changes  Heme/Lymph: Bruising, Bleeding, Lymphadenopathy  Endocrine: Polyuria, Polydipsia, Temperature Intolerance    PHYSICAL EXAM:   Vital Signs:  Height 5' 11\" (1.803 m), weight 269 lb (122 kg).     Constitutional: No acute distress. Alert and oriented x 3.  Eyes: EOMI, PERRLA, clear sclera b/l  HENT: NCAT, Moist mucous membranes, Oropharynx without erythema or exudates  Cardiovascular: S1, S2, no S3, no S4, Regular rate and rhythm, No murmurs/gallops/rubs.   Respiratory: Clear to auscultation bilaterally.  No wheezes/rales/rhonchi  Gastrointestinal: Soft, nontender, nondistended. Positive bowel sounds x 4. No rebound tenderness. No hepatomegaly, No splenomegaly  Genitourinary: No CVA tenderness bilaterally  Neurologic: No focal neurological deficits, CN II-XII intact, light touch intact, MSK Strength 5/5 and symmetric in all extremities, normal gait  Musculoskeletal: Full range of motion of all extremities, no clubbing/swelling/edema; +Straight leg raise b/l  Psychiatric: Appropriate mood and affect  Heme/Lymph/Immune: No cervical/inguinal LAD      DATA REVIEWED   Labs:  Recent Results (from the past 8760 hours)   CBC W/ DIFFERENTIAL    Collection Time: 08/02/24  7:42 AM   Result Value Ref Range    WBC 7.0 4.0 - 11.0 x10(3) uL    RBC 5.14 4.30 - 5.70 x10(6)uL    HGB 16.3 13.0 - 17.5 g/dL    HCT 47.2 39.0 - 53.0 %    MCV 91.8 80.0 - 100.0 fL    MCH 31.7 26.0 - 34.0 pg    MCHC 34.5 31.0 - 37.0 g/dL    RDW-SD 41.5 35.1 - 46.3 fL    RDW 12.4 11.0 - 15.0 %    .0 150.0 - 450.0 10(3)uL    Neutrophil Absolute Prelim 3.73 1.50  - 7.70 x10 (3) uL    Neutrophil Absolute 3.73 1.50 - 7.70 x10(3) uL    Lymphocyte Absolute 2.04 1.00 - 4.00 x10(3) uL    Monocyte Absolute 0.78 0.10 - 1.00 x10(3) uL    Eosinophil Absolute 0.32 0.00 - 0.70 x10(3) uL    Basophil Absolute 0.07 0.00 - 0.20 x10(3) uL    Immature Granulocyte Absolute 0.03 0.00 - 1.00 x10(3) uL    Neutrophil % 53.5 %    Lymphocyte % 29.3 %    Monocyte % 11.2 %    Eosinophil % 4.6 %    Basophil % 1.0 %    Immature Granulocyte % 0.4 %     *Note: Due to a large number of results and/or encounters for the requested time period, some results have not been displayed. A complete set of results can be found in Results Review.       Recent Results (from the past 8760 hours)   Comp Metabolic Panel (14)    Collection Time: 08/02/24  7:42 AM   Result Value Ref Range    Glucose 103 (H) 70 - 99 mg/dL    Sodium 138 136 - 145 mmol/L    Potassium 4.3 3.5 - 5.1 mmol/L    Chloride 105 98 - 112 mmol/L    CO2 29.0 21.0 - 32.0 mmol/L    Anion Gap 4 0 - 18 mmol/L    BUN 12 9 - 23 mg/dL    Creatinine 1.11 0.70 - 1.30 mg/dL    BUN/CREA Ratio 10.8 10.0 - 20.0    Calcium, Total 10.3 8.7 - 10.4 mg/dL    Calculated Osmolality 286 275 - 295 mOsm/kg    eGFR-Cr 77 >=60 mL/min/1.73m2    ALT 47 10 - 49 U/L    AST 25 <34 U/L    Alkaline Phosphatase 96 45 - 117 U/L    Bilirubin, Total 1.3 (H) 0.3 - 1.2 mg/dL    Total Protein 7.5 5.7 - 8.2 g/dL    Albumin 4.7 3.2 - 4.8 g/dL    Globulin  2.8 2.0 - 3.5 g/dL    A/G Ratio 1.7 1.0 - 2.0    Patient Fasting for CMP? Yes      *Note: Due to a large number of results and/or encounters for the requested time period, some results have not been displayed. A complete set of results can be found in Results Review.         Imaging:  MRI cervical spine 12/28/2017  FINDINGS:   The cranio-cervical junction is unremarkable.     There is mild grade 1 anterolisthesis of C3 on C4. There is trace   retrolisthesis of C5 on C6.     There is normal height and signal intensity of the vertebral bodies.      There is moderate loss of C5-6 and C6-7 intervertebral disc height   and signal. There is moderate loss of C3-4 intervertebral disc height   anteriorly.     There is normal signal intensity of the cervical cord.     A 2.4 cm T2 hyperintense signal abnormality in the superficial lobe   of the right parotid gland cannot be fully assessed on the current   study.     At C2-C3: There is very minimal right neural foraminal stenosis. The   left neural foramen and central spinal canal are widely patent.     At C3-C4: A disc osteophyte complex deforms the ventral cord and   causes moderate to severe central spinal narrowing. No associated   cord signal abnormality. There is severe left and moderate/severe   right neural foraminal stenosis.     At C4-C5: A small disc osteophyte complex is seen without cord   deformity. There is severe left and moderate/severe right neural   foraminal stenosis     At C5-C6:  A disc osteophyte complex deforms the cord and causes   markedly severe central spinal narrowing. Severe bilateral neural   foraminal stenosis is more advanced on the left     At C6-C7: A disc osteophyte complex deforms the right anterior cord   and causes severe central spinal narrowing. Severe bilateral neural   foraminal stenosis is present.       At C7-T1:  A small disc osteophyte complex is seen without cord   deformity. Severe left and mild to moderate right neural foraminal   stenosis       IMPRESSION:     A 2.4 cm T2 hyperintense signal abnormality in the superficial lobe   of the right parotid gland cannot be fully assessed on the current   study. Dedicated CT soft tissue neck with contrast would be helpful   for further assessment.     Multilevel spondylosis is most advanced at C3-4, C5-6 and C6-7. No   abnormal cord signal.     X-rays 7/19/2023  Right hip    Impression   CONCLUSION:  1. No acute fracture dislocation.  2. Moderate right hip osteoarthritis has progressed.  Mild left hip degenerative changes..        Right knee    Impression   CONCLUSION:  1. No acute fracture dislocation.  2. Mild tricompartment osteoarthritis.          Chest x-ray 1/16/2024    Impression   CONCLUSION: No acute cardiopulmonary disease.          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  - Clinically this could be related to upper respiratory bronchitis, possible reactive airway disease the COVID have flared up his back.  -Would recommend initial trial of conservative therapy:    -Acetaminophen 500-650 mg every 4-6 hours as needed for pain relief  - Avoid the use of NSAIDs such as Advil/ibuprofen, Aleve/naproxen while on steroids  - Proceed with prednisone 40 mg for 3 days, 30 mg for 3 days, 20 mg for 3 days, 10 mg for 3 days  -Ibuprofen 200-400 mg every 8 hours as needed for anti-inflammatory and pain relief only when steroids are completed  -For more severe pain, will try cyclobenzaprine 5 mg once a day.  Take first at nighttime as this can cause sleepiness, drowsiness.  If tolerated well, can use 3 times a day on an as-needed basis.  Be careful with driving or operating heavy machinery on this medication  -Trial of physical therapy can be considered if home stretches and exercises insufficient.    -Advised to notify clinic if still no improvement in 4 to 6 weeks.      Chronic cough  Lingering cough over the last 3 months, possibly related to allergic rhinitis.  Worsening over the last 2 weeks concerning for symptoms of acute bacterial rhinosinusitis.  -Chest x-ray without significant abnormality  - Trial of flonase 1 spray each nostril until symptoms improve  - Trial of antihistamine Zyrtec/Claritin/Allegra  -Concern for bronchitis, will proceed with albuterol 2 puffs every 4-6 hours as  needed.  - Will proceed with azithromycin given duration of symptoms, prednisone taper as above.  - We will refer to pulmonology.  We will need to delay JAMES evaluation until acute symptoms improved.     Neck pain  Cervical radiculopathy  Inciting event was bowling last Thursday, then acute onset the next day.  Exam notable for: Spurling test, remains neurologically intact  - Since last visit, this has improved.  There may be some involvement of the right hand with some numbness and tingling  - Physical therapy will also help open up these areas     Psoriasis versus dermatitis  Localized specifically to knuckles of digits 2, 3, 4.  - Continue with home moisturizing cream 1-2 times a day especially after showering  - Trial of triamcinolone 1 application twice a day for 7 days     Ventral hernia  Only with recumbency, some enlargement but otherwise asymptomatic  - We will clinically monitor for now  - If changes in bowel habits, GI symptoms occur within require surgical intervention.     Left thigh meralgia paresthetica  Intermittent burning sensation of the left thigh.  Possibly related to cervical radiculopathy, versus meralgia paresthetica  - Provided exercises to perform at home for both cervical radiculopathy and meralgia paresthetica  - We will monitor     Coronary artery disease  .  Calcium score 504 elevated.   - On aspirin, losartan, statin  -Established with cardiology, Dr. Marion, most recent visit 3/5/2024.  Seems to be stable.  Follow-up in 12 months     HTN  - BP slightly elevated, possibly due to pain  - Pain management as above  - We will continue current management for now and monitor over time    Weight management  Concern for weight gain without other associated symptoms concerning for secondary causes.     Wt Readings from Last 6 Encounters:   01/09/25 269 lb (122 kg)   07/23/24 263 lb (119.3 kg)   01/16/24 263 lb (119.3 kg)   06/27/23 257 lb (116.6 kg)   05/11/22 251 lb (113.9 kg)   03/30/22 244 lb  (110.7 kg)          We will obtain fasting blood work to rule out secondary causes, though low suspicion for secondary cause     We discussed the importance of net caloric reduction by:  -Optimizing nutrition.  Would benefit to focus on high-fiber intake with fruits, vegetables.  Opt for leaner meats such as chicken/fish/turkey/pork, baked rather than fried/use of high oil content.  Low-fat dairy can be incorporated.  - The goal for nutrition is to gradually decrease calories per day.  We discussed other methods to achieve this such as decreasing excessive snacking, decreasing portions, decrease the frequency of eating out.  - Weight gain is best obtained by modifying food intake.  However, exercise can help achieve caloric reduction by burning off calories.  Recommend at least 150 minutes of moderate intensity exercise for weight maintenance.  Higher intensity exercise for longer periods of time can also promote weight loss   -These lifestyle changes should be viewed as long-term even with weight loss.  This promotes overall general health and decrease risk for chronic diseases in the future.  -We will attempt a trial with conservative measures within 3 months.  If no improvement, we can consider medications or possibly referral to a specialist in the future         Orders This Visit:  No orders of the defined types were placed in this encounter.      Meds This Visit:  Requested Prescriptions      No prescriptions requested or ordered in this encounter       Imaging & Referrals:  None       Health Maintenance  HTN Screen: BP at goal  DM Screen: Check fasting sugar  HLD Screen: Check fasting lipid panel  HCV Screen: Considered low risk  HIV Screen: considered low risk  G/C/Syphilis: Considered low risk    Colon Cancer Screening (45-70): Last colonoscopy reported to be 8/2016, - due in 2026  Prostate Cancer Screening: (50-70): Check PSA   Lung Cancer Screening (55-79 with 30 p/year and active < 15 years): Not  indicated  AAA Screening (65-75 Hx of smoking): Not indicated    Influenza: Annually - utd  Td/Tdap: Last Tdap 2012, due   Zoster (60+): Recommended 2 doses Shringrix, should check with insurance for coverage and obtain at the pharmacy.  HPV (19-26): Not indicated  Pneumococcal: Due for Prevnar 20    Immunization History   Administered Date(s) Administered    Covid-19 Vaccine Moderna 100 mcg/0.5 ml 04/04/2021, 05/02/2021    Covid-19 Vaccine Moderna Bivalent 50mcg/0.5mL 12+ years 11/14/2022    FLUZONE 6 months and older PFS 0.5 ml (17675) 09/26/2018, 10/26/2019, 09/30/2020, 10/30/2021, 11/09/2022, 10/25/2023    Flucelvax 0.5ml Vaccine 10/04/2017    Fluvirin, 3 Years & >, Im 12/11/2012, 12/18/2013, 10/05/2017    Influenza 01/05/2013, 10/23/2019    Moderna Covid-19 Vaccine 50mcg/0.5ml 12yrs+ 10/25/2023    TDAP 12/11/2012     Return to clinic in 6 months for annual physical examination    Spent 30 minutes obtaining history, evaluating patient, discussing treatment options, diet, exercise, review of available labs and radiology reports, and completing documentation.     Bobby Swift MD, 01/09/25, 4:44 PM

## 2025-01-09 NOTE — PATIENT INSTRUCTIONS
You are seen in clinic today for back pain.  Today, I recommended:    Lets get a dedicated x-ray of the low back.    -Would recommend initial trial of conservative therapy:    -Acetaminophen 500-650 mg every 4-6 hours as needed for pain relief  - Avoid the use of NSAIDs such as Advil/ibuprofen, Aleve/naproxen while on steroids  - Proceed with prednisone 40 mg for 3 days, 30 mg for 3 days, 20 mg for 3 days, 10 mg for 3 days  -Ibuprofen 200-400 mg every 8 hours as needed for anti-inflammatory and pain relief only when steroids are completed  -For more severe pain, will try cyclobenzaprine 5 mg once a day.  Take first at nighttime as this can cause sleepiness, drowsiness.  If tolerated well, can use 3 times a day on an as-needed basis.  Be careful with driving or operating heavy machinery on this medication  -Trial of physical therapy can be considered if home stretches and exercises insufficient.    -Advised to notify clinic if still no improvement in 4 to 6 weeks.    For the cough, we recommend:  - Trial of flonase 1 spray each nostril until symptoms improve  - Trial of antihistamine Zyrtec/Claritin/Allegra  -Concern for bronchitis, will proceed with albuterol 2 puffs every 4-6 hours as needed.  - Will proceed with azithromycin given duration of symptoms, prednisone taper as above.  - We will refer to pulmonology.  We will need to delay JAMES evaluation until acute symptoms improved.    Monitor for any changes of the cervical neck pain    Please obtain the thyroid blood test to ensure we have normalization of  thyroid levels    Follow-up with cardiology, Dr. Marion    Return to clinic in 6 months for annual physical examination.

## 2025-01-13 ENCOUNTER — HOSPITAL ENCOUNTER (OUTPATIENT)
Dept: GENERAL RADIOLOGY | Age: 60
Discharge: HOME | End: 2025-01-13
Attending: INTERNAL MEDICINE
Payer: COMMERCIAL

## 2025-01-13 DIAGNOSIS — M54.50 MIDLINE LOW BACK PAIN, UNSPECIFIED CHRONICITY, UNSPECIFIED WHETHER SCIATICA PRESENT: ICD-10-CM

## 2025-01-13 PROCEDURE — 72100 X-RAY EXAM L-S SPINE 2/3 VWS: CPT | Performed by: INTERNAL MEDICINE

## 2025-01-16 ENCOUNTER — TELEPHONE (OUTPATIENT)
Dept: INTERNAL MEDICINE CLINIC | Facility: CLINIC | Age: 60
End: 2025-01-16

## 2025-01-16 NOTE — TELEPHONE ENCOUNTER
Please notify patient of the lumbar x-ray shows moderate degenerative changes in the lower thoracic and lumbar spine with evidence of more advanced osteoarthritis.  This is likely the cause of his ongoing pain symptoms.  Lets continue with the conservative management we discussed, if still ongoing may need to pursue physical therapy

## 2025-02-11 ENCOUNTER — HOSPITAL ENCOUNTER (OUTPATIENT)
Dept: GENERAL RADIOLOGY | Age: 60
Discharge: HOME | End: 2025-02-11
Attending: INTERNAL MEDICINE
Payer: COMMERCIAL

## 2025-02-11 DIAGNOSIS — M54.12 CERVICAL RADICULOPATHY: ICD-10-CM

## 2025-02-11 PROCEDURE — 72040 X-RAY EXAM NECK SPINE 2-3 VW: CPT | Performed by: INTERNAL MEDICINE

## 2025-02-18 ENCOUNTER — TELEPHONE (OUTPATIENT)
Dept: INTERNAL MEDICINE CLINIC | Facility: CLINIC | Age: 60
End: 2025-02-18

## 2025-02-18 NOTE — TELEPHONE ENCOUNTER
MyChart message sent.  Cervical spine showed multilevel cervical spondylosis.  There is some evidence of osteophyte formation.    Awaiting reply.

## 2025-02-26 ENCOUNTER — PATIENT MESSAGE (OUTPATIENT)
Dept: CASE MANAGEMENT | Age: 60
End: 2025-02-26

## 2025-02-26 NOTE — TELEPHONE ENCOUNTER
Refill request is for a maintenance medication and has met the criteria specified in the Ambulatory Medication Refill Standing Order for eligibility, visits, laboratory, alerts and was sent to the requested pharmacy.    Requested Prescriptions     Signed Prescriptions Disp Refills    DICLOFENAC 50 MG Oral Tab EC 90 tablet 0     Sig: TAKE 1 TABLET BY MOUTH 3 TIMES DAILY AS NEEDED.     Authorizing Provider: ANKIT WHELAN     Ordering User: NILAY DOWNEY      Patient sent [facility] message that he is still taking the prescription

## 2025-03-03 ENCOUNTER — PATIENT MESSAGE (OUTPATIENT)
Dept: INTERNAL MEDICINE CLINIC | Facility: CLINIC | Age: 60
End: 2025-03-03

## 2025-03-03 ENCOUNTER — TELEPHONE (OUTPATIENT)
Dept: CASE MANAGEMENT | Age: 60
End: 2025-03-03

## 2025-03-03 NOTE — TELEPHONE ENCOUNTER
[facility]    Placed in Dr Swift mail box

## 2025-03-03 NOTE — TELEPHONE ENCOUNTER
Hello     We have received correspondence from the MRI lumbar spine. The health plan has denied this request.     Clinical rationale:   Your doctor told us that you are having lower back pain that travels to your hip and/or leg. The request cannot be approved because:        Imaging requires six weeks of provider directed treatment to be completed. Supported treatments include (but are not limited to) drugs for swelling or pain, an in office workout (physical therapy), and/or oral or injected steroids. This must have been completed in the past three months without improved symptoms. Contact (via office visit, phone, email, or messaging) must occur after the treatment is completed. This has not been met because:  The notes sent to us do not show you have completed a full six weeks of this type of treatment.  Your treatment did not occur within the last 12 weeks.  Symptoms must be the same or worse after treatment to support imaging.  The notes sent to us do not show any contact with your provider after you completed your treatment. This contact is needed to plan your future care.      This finding was based on review of [facility] Guidelines Section(s): Lower Extremity Pain with Neurological Features (Radiculopathy, Radiculitis, or Plexopathy and Neuropathy) with or without Low Back (Lumbar Spine) Pain (SP 6.1) and 1.0 General Guidelines.    Additional information in media    You are welcome to peer to peer  Ph. [phone]  Case # [id number]    Thank you,  [place]   Referral specialist

## 2025-03-05 NOTE — TELEPHONE ENCOUNTER
For my own personal recall, back pain had been ongoing December 2024 after an illness from [place].  Attempted stretches, exercises.  Worsened over the last few days despite improvement of his respiratory symptoms.  Presented to see me 1/9/2025.  Attempted trial of conservative measures.  Home exercise regiment.  Recall lumbar spine 1/15 showed moderate degenerative changes.  We then proceed with the MRI lumbar spine due to worsening of symptoms.  After antibiotics.  Symptoms have been ongoing 8 weeks at that time, we pursued physical therapy leading to the MRI evaluation.  Originally was scheduled for March but delayed due to denial of authorization.    Authorization - [id number]  UNTIL 8/27/2025    Please notify the patient that we did get authorization, they needed clarification on duration of symptoms and we are within the timeframe.  Authorization number as above.    Please clarify with the patient and where he would like to get the scan completed

## 2025-03-06 NOTE — TELEPHONE ENCOUNTER
Patient returned our call, nurse unavailable.     Please call patient again.     Patient's #[phone]

## 2025-03-06 NOTE — TELEPHONE ENCOUNTER
Spoke to pt and advised on MD message. He will be going to [facility]. [employer] sent with info additionally.

## 2025-03-07 NOTE — TELEPHONE ENCOUNTER
Please fax over the MRI order from 1/29/2025 MRI lumbar spine as requested.  Notify patient once completed

## 2025-03-26 ENCOUNTER — LAB ENCOUNTER (OUTPATIENT)
Dept: LAB | Age: 60
End: 2025-03-26
Attending: INTERNAL MEDICINE
Payer: COMMERCIAL

## 2025-03-26 DIAGNOSIS — I10 ESSENTIAL HYPERTENSION, MALIGNANT: Primary | ICD-10-CM

## 2025-03-26 LAB
ALBUMIN SERPL-MCNC: 4.5 G/DL (ref 3.2–4.8)
ALBUMIN/GLOB SERPL: 2.3 {RATIO} (ref 1–2)
ALP LIVER SERPL-CCNC: 80 U/L
ALT SERPL-CCNC: 39 U/L
ANION GAP SERPL CALC-SCNC: 8 MMOL/L (ref 0–18)
AST SERPL-CCNC: 20 U/L (ref ?–34)
BILIRUB SERPL-MCNC: 1.1 MG/DL (ref 0.3–1.2)
BUN BLD-MCNC: 13 MG/DL (ref 9–23)
BUN/CREAT SERPL: 11.1 (ref 10–20)
CALCIUM BLD-MCNC: 9.2 MG/DL (ref 8.7–10.4)
CHLORIDE SERPL-SCNC: 106 MMOL/L (ref 98–112)
CHOLEST SERPL-MCNC: 166 MG/DL (ref ?–200)
CO2 SERPL-SCNC: 29 MMOL/L (ref 21–32)
CREAT BLD-MCNC: 1.17 MG/DL
EGFRCR SERPLBLD CKD-EPI 2021: 72 ML/MIN/1.73M2 (ref 60–?)
FASTING PATIENT LIPID ANSWER: YES
FASTING STATUS PATIENT QL REPORTED: YES
GLOBULIN PLAS-MCNC: 2 G/DL (ref 2–3.5)
GLUCOSE BLD-MCNC: 94 MG/DL (ref 70–99)
HDLC SERPL-MCNC: 48 MG/DL (ref 40–59)
LDLC SERPL CALC-MCNC: 95 MG/DL (ref ?–100)
NONHDLC SERPL-MCNC: 118 MG/DL (ref ?–130)
OSMOLALITY SERPL CALC.SUM OF ELEC: 296 MOSM/KG (ref 275–295)
POTASSIUM SERPL-SCNC: 4.9 MMOL/L (ref 3.5–5.1)
PROT SERPL-MCNC: 6.5 G/DL (ref 5.7–8.2)
SODIUM SERPL-SCNC: 143 MMOL/L (ref 136–145)
TRIGL SERPL-MCNC: 130 MG/DL (ref 30–149)
VLDLC SERPL CALC-MCNC: 21 MG/DL (ref 0–30)

## 2025-03-26 PROCEDURE — 36415 COLL VENOUS BLD VENIPUNCTURE: CPT

## 2025-03-26 PROCEDURE — 80061 LIPID PANEL: CPT

## 2025-03-26 PROCEDURE — 80053 COMPREHEN METABOLIC PANEL: CPT

## 2025-04-15 ENCOUNTER — PATIENT MESSAGE (OUTPATIENT)
Dept: INTERNAL MEDICINE CLINIC | Facility: CLINIC | Age: 60
End: 2025-04-15

## 2025-04-24 ENCOUNTER — PATIENT MESSAGE (OUTPATIENT)
Dept: INTERNAL MEDICINE CLINIC | Facility: CLINIC | Age: 60
End: 2025-04-24

## 2025-04-29 ENCOUNTER — OFFICE VISIT (OUTPATIENT)
Dept: PULMONOLOGY | Facility: CLINIC | Age: 60
End: 2025-04-29
Payer: COMMERCIAL

## 2025-04-29 VITALS
HEART RATE: 76 BPM | HEIGHT: 71 IN | BODY MASS INDEX: 38.5 KG/M2 | OXYGEN SATURATION: 97 % | RESPIRATION RATE: 14 BRPM | SYSTOLIC BLOOD PRESSURE: 143 MMHG | DIASTOLIC BLOOD PRESSURE: 95 MMHG | WEIGHT: 275 LBS

## 2025-04-29 DIAGNOSIS — G47.10 HYPERSOMNIA: Primary | ICD-10-CM

## 2025-04-29 PROCEDURE — 99204 OFFICE O/P NEW MOD 45 MIN: CPT | Performed by: INTERNAL MEDICINE

## 2025-04-29 NOTE — H&P
Referring Physician  Bobby Swift MD    Chief Complaint  Sleep apnea evaluation    History of Present Illness  Patient is a 59-year-old male presents pulmonary clinic for initial visit.  Admits to some hypersomnia and fatigue and episodes of snoring.  Concerned he may have underlying sleep apnea.  Denies previous polysomnography.  Also admits to chronic cough primarily in the morning nonproductive in nature.  Denies history of known GERD or lung disease.  Admits to occasional bouts of bronchitis.  Denies use of inhaler therapy.  States he may have some underlying postnasal drip.    Review of Systems  Constitutional: denies weight loss, fevers, chills, weakness, + fatigue and hypersomnia  HEENT: denies epistaxis, sore throat, postnasal drip  Cardio: denies chest pain, chest pressure, palpitations  Respiratory: denies dyspnea, +cough, denies wheezing, hemoptysis   GI: denies nausea, vomiting, abdominal pain  : denies dysuria, hematuria  Musculoskeletal: denies arthralgia, myalgia  Integumentary: denies rash, itching  Neurological: denies syncope, weakness, dizziness,   Psychiatric: denies depression, anxiety  Hematologic: denies bruising    Past Medical History  Past Medical History[1]     Surgical History  Past Surgical History[2]    Family History  Family History[3]     Social History  Tobacco: Denies  Alcohol: Denies significant intake  Illicit Drugs: Denies    Medications  Medications Ordered Prior to Encounter[4]    Allergies  Allergies[5]    Physical Exam  Constitutional: no acute distress  HEENT: PERRL  Neck: supple, no JVD  Cardio: RRR, S1 S2  Respiratory: clear to auscultation bilaterally, no wheezing, rales, rhonchi, crackles  GI: abdomen soft, non tender, active bowel sounds, no organomegaly  Extremities: no clubbing, cyanosis, edema  Neurologic: no gross motor deficits  Skin: warm, dry  Lymphatic: no supraclavicular lymphadenopathy     Assessment  1.  Likely JAMES  2.  Chronic cough    Plan  -Patient  presents today for pulm evaluation of underlying sleep apnea.  I do have high clinical suspicion.  I have placed order for home polysomnography.  Once I review results will set up with CPAP indicated.  - Patient with history of chronic cough likely secondary to upper airway cough syndrome may have component of reactive airway disease.  May use albuterol MDI as needed basis.  Advised patient to monitor response to Flonase as needed be.    Shubham Marley DO  Pulmonary Critical Care Medicine  [facility]  4/29/2025  2:42 PM        [1]   Past Medical History:   ALCOHOL USE    Cervical radiculopathy    Essential hypertension    High blood pressure    High cholesterol    Hyperlipidemia   [2]   Past Surgical History:  Procedure Laterality Date    Colonoscopy  5/2015    Other surgical history  02/10/2018    tumor removal carotid    Vasectomy  2002   [3]   Family History  Problem Relation Age of Onset    Cancer Father         bladder, prostate    Heart Disorder Father         MI at age 40    Cancer Mother 81        lung, smoker    Other (hypothyroidism) Mother     Cancer Sister     Other (Rheumatoid Arthritis) Sister     Hypertension Brother     Stroke Brother         age 56 (second stroke)    Heart Attack Paternal Grandfather         age 50   [4]   Current Outpatient Medications on File Prior to Visit   Medication Sig Dispense Refill    DICLOFENAC 50 MG Oral Tab EC TAKE 1 TABLET BY MOUTH 3 TIMES DAILY AS NEEDED. 90 tablet 0    cyclobenzaprine 5 MG Oral Tab Take 1 tablet (5 mg total) by mouth 3 (three) times daily as needed for Muscle spasms. 30 tablet 0    albuterol 108 (90 Base) MCG/ACT Inhalation Aero Soln Inhale 2 puffs into the lungs every 6 (six) hours as needed. 1 each 1    levothyroxine 25 MCG Oral Tab Take 1 tablet (25 mcg total) by mouth before breakfast. 90 tablet 3    fluocinolone 0.01 % External Cream 1 thin application to the affected areas twice a day as needed. 60 g 0    atorvastatin 40 MG Oral Tab TAKE  1 TABLET BY MOUTH EVERY DAY 90 tablet 3    triamcinolone 0.1 % External Cream Apply topically 2 (two) times daily as needed. 60 g 3    aspirin (ASPIR-LOW) 81 MG Oral Tab EC Take 1 tablet (81 mg total) by mouth daily. 30 tablet 11    Losartan Potassium 25 MG Oral Tab Take 1 tablet (25 mg total) by mouth daily. 30 tablet 5     No current facility-administered medications on file prior to visit.   [5] No Known Allergies

## 2025-05-06 ENCOUNTER — LAB ENCOUNTER (OUTPATIENT)
Dept: LAB | Age: 60
End: 2025-05-06
Attending: INTERNAL MEDICINE
Payer: COMMERCIAL

## 2025-05-06 DIAGNOSIS — Z01.812 PRE-OPERATIVE LABORATORY EXAMINATION: ICD-10-CM

## 2025-05-06 DIAGNOSIS — Z01.818 PREOP EXAMINATION: Primary | ICD-10-CM

## 2025-05-06 LAB
ANION GAP SERPL CALC-SCNC: 9 MMOL/L (ref 0–18)
BASOPHILS # BLD AUTO: 0.08 X10(3) UL (ref 0–0.2)
BASOPHILS NFR BLD AUTO: 1 %
BUN BLD-MCNC: 10 MG/DL (ref 9–23)
BUN/CREAT SERPL: 8.7 (ref 10–20)
CALCIUM BLD-MCNC: 9.6 MG/DL (ref 8.7–10.4)
CHLORIDE SERPL-SCNC: 104 MMOL/L (ref 98–112)
CO2 SERPL-SCNC: 28 MMOL/L (ref 21–32)
CREAT BLD-MCNC: 1.15 MG/DL (ref 0.7–1.3)
DEPRECATED RDW RBC AUTO: 40.8 FL (ref 35.1–46.3)
EGFRCR SERPLBLD CKD-EPI 2021: 73 ML/MIN/1.73M2 (ref 60–?)
EOSINOPHIL # BLD AUTO: 0.18 X10(3) UL (ref 0–0.7)
EOSINOPHIL NFR BLD AUTO: 2.3 %
ERYTHROCYTE [DISTWIDTH] IN BLOOD BY AUTOMATED COUNT: 12.2 % (ref 11–15)
FASTING STATUS PATIENT QL REPORTED: YES
GLUCOSE BLD-MCNC: 95 MG/DL (ref 70–99)
HCT VFR BLD AUTO: 44.1 % (ref 39–53)
HGB BLD-MCNC: 15 G/DL (ref 13–17.5)
IMM GRANULOCYTES # BLD AUTO: 0.05 X10(3) UL (ref 0–1)
IMM GRANULOCYTES NFR BLD: 0.6 %
LYMPHOCYTES # BLD AUTO: 2.26 X10(3) UL (ref 1–4)
LYMPHOCYTES NFR BLD AUTO: 29 %
MCH RBC QN AUTO: 30.9 PG (ref 26–34)
MCHC RBC AUTO-ENTMCNC: 34 G/DL (ref 31–37)
MCV RBC AUTO: 90.9 FL (ref 80–100)
MONOCYTES # BLD AUTO: 0.71 X10(3) UL (ref 0.1–1)
MONOCYTES NFR BLD AUTO: 9.1 %
NEUTROPHILS # BLD AUTO: 4.51 X10 (3) UL (ref 1.5–7.7)
NEUTROPHILS # BLD AUTO: 4.51 X10(3) UL (ref 1.5–7.7)
NEUTROPHILS NFR BLD AUTO: 58 %
OSMOLALITY SERPL CALC.SUM OF ELEC: 291 MOSM/KG (ref 275–295)
PLATELET # BLD AUTO: 234 10(3)UL (ref 150–450)
POTASSIUM SERPL-SCNC: 4.6 MMOL/L (ref 3.5–5.1)
RBC # BLD AUTO: 4.85 X10(6)UL (ref 4.3–5.7)
SODIUM SERPL-SCNC: 141 MMOL/L (ref 136–145)
WBC # BLD AUTO: 7.8 X10(3) UL (ref 4–11)

## 2025-05-06 PROCEDURE — 80048 BASIC METABOLIC PNL TOTAL CA: CPT

## 2025-05-06 PROCEDURE — 36415 COLL VENOUS BLD VENIPUNCTURE: CPT

## 2025-05-06 PROCEDURE — 85025 COMPLETE CBC W/AUTO DIFF WBC: CPT

## 2025-05-09 NOTE — H&P
The above referenced H&P was reviewed by Cesar Marion MD on 5/22/2025, the patient was examined and no significant changes have occurred in the patient's condition since the H&P was performed. The risks, benefits, and alternatives were discussed with the patient and/or the family who consented.  The patient had a screening history (including review of previous problems with anesthesia and history of heavy snoring or sleep apnea)  and exam completed and there are no contraindications to sedation.  ASA designation is ASA 3 - Patient with moderate systemic disease with functional limitations.  Mallampati score: I (soft palate, uvula, fauces, and tonsillar pillars visible).

## 2025-05-17 ENCOUNTER — TELEPHONE (OUTPATIENT)
Dept: INTERNAL MEDICINE CLINIC | Facility: CLINIC | Age: 60
End: 2025-05-17

## 2025-05-17 NOTE — TELEPHONE ENCOUNTER
Proceed [facility] report/20 5/2025  - Straightening of lumbar lordosis from L1-L2, L4-L5 through the lumbar spine with increased lordotic curvature L3-L4 through L5-S1.  Retrolisthesis at L4-L5 and borderline retrolisthesis at L5-S1    Multilevel disc desiccation and moderate disc space narrowing at L2-L5 with type I Modic endplate changes    L2-L3 3 mm central disc herniation with cranial migration disc bulge, L3-L4 12 mm central disc herniation with disc bulge and endplate spurring, L4-L5 3 mm central disc herniation    Of note, patient has interventional cardiology appointment 5/22/2025

## 2025-05-22 ENCOUNTER — HOSPITAL ENCOUNTER (INPATIENT)
Dept: INTERVENTIONAL RADIOLOGY/VASCULAR | Facility: HOSPITAL | Age: 60
LOS: 5 days | Discharge: HOME HEALTH | End: 2025-05-27
Attending: INTERNAL MEDICINE | Admitting: INTERNAL MEDICINE
Payer: COMMERCIAL

## 2025-05-22 ENCOUNTER — APPOINTMENT (OUTPATIENT)
Dept: PICC SERVICES | Facility: HOSPITAL | Age: 60
End: 2025-05-22
Attending: INTERNAL MEDICINE
Payer: COMMERCIAL

## 2025-05-22 ENCOUNTER — APPOINTMENT (OUTPATIENT)
Dept: CV DIAGNOSTICS | Facility: HOSPITAL | Age: 60
End: 2025-05-22
Attending: CLINICAL NURSE SPECIALIST
Payer: COMMERCIAL

## 2025-05-22 ENCOUNTER — ANESTHESIA EVENT (OUTPATIENT)
Dept: SURGERY | Facility: HOSPITAL | Age: 60
End: 2025-05-22
Payer: COMMERCIAL

## 2025-05-22 ENCOUNTER — APPOINTMENT (OUTPATIENT)
Dept: GENERAL RADIOLOGY | Facility: HOSPITAL | Age: 60
End: 2025-05-22
Attending: CLINICAL NURSE SPECIALIST
Payer: COMMERCIAL

## 2025-05-22 ENCOUNTER — APPOINTMENT (OUTPATIENT)
Dept: CT IMAGING | Facility: HOSPITAL | Age: 60
End: 2025-05-22
Attending: CLINICAL NURSE SPECIALIST
Payer: COMMERCIAL

## 2025-05-22 ENCOUNTER — APPOINTMENT (OUTPATIENT)
Dept: ULTRASOUND IMAGING | Facility: HOSPITAL | Age: 60
End: 2025-05-22
Attending: CLINICAL NURSE SPECIALIST
Payer: COMMERCIAL

## 2025-05-22 DIAGNOSIS — Z95.1 S/P CABG (CORONARY ARTERY BYPASS GRAFT): Primary | ICD-10-CM

## 2025-05-22 DIAGNOSIS — R93.1 ABNORMAL CARDIAC CT ANGIOGRAPHY: ICD-10-CM

## 2025-05-22 LAB
ALBUMIN SERPL-MCNC: 4.4 G/DL (ref 3.2–4.8)
ALBUMIN/GLOB SERPL: 1.8 {RATIO} (ref 1–2)
ALP LIVER SERPL-CCNC: 103 U/L (ref 45–117)
ALT SERPL-CCNC: 58 U/L (ref 10–49)
ANION GAP SERPL CALC-SCNC: 8 MMOL/L (ref 0–18)
ANTIBODY SCREEN: NEGATIVE
APTT PPP: 27.3 SECONDS (ref 23–36)
AST SERPL-CCNC: 25 U/L (ref ?–34)
BILIRUB SERPL-MCNC: 1 MG/DL (ref 0.3–1.2)
BUN BLD-MCNC: 10 MG/DL (ref 9–23)
BUN/CREAT SERPL: 9.9 (ref 10–20)
CALCIUM BLD-MCNC: 9.4 MG/DL (ref 8.7–10.4)
CHLORIDE SERPL-SCNC: 104 MMOL/L (ref 98–112)
CO2 SERPL-SCNC: 26 MMOL/L (ref 21–32)
CREAT BLD-MCNC: 1.01 MG/DL (ref 0.7–1.3)
DEPRECATED RDW RBC AUTO: 40.4 FL (ref 35.1–46.3)
EGFRCR SERPLBLD CKD-EPI 2021: 86 ML/MIN/1.73M2 (ref 60–?)
ERYTHROCYTE [DISTWIDTH] IN BLOOD BY AUTOMATED COUNT: 12.4 % (ref 11–15)
EST. AVERAGE GLUCOSE BLD GHB EST-MCNC: 103 MG/DL (ref 68–126)
GLOBULIN PLAS-MCNC: 2.4 G/DL (ref 2–3.5)
GLUCOSE BLD-MCNC: 96 MG/DL (ref 70–99)
HBA1C MFR BLD: 5.2 % (ref ?–5.7)
HCT VFR BLD AUTO: 43.9 % (ref 39–53)
HGB BLD-MCNC: 15.4 G/DL (ref 13–17.5)
INR BLD: 0.97 (ref 0.8–1.2)
MCH RBC QN AUTO: 31.4 PG (ref 26–34)
MCHC RBC AUTO-ENTMCNC: 35.1 G/DL (ref 31–37)
MCV RBC AUTO: 89.6 FL (ref 80–100)
OSMOLALITY SERPL CALC.SUM OF ELEC: 285 MOSM/KG (ref 275–295)
PLATELET # BLD AUTO: 228 10(3)UL (ref 150–450)
POTASSIUM SERPL-SCNC: 3.9 MMOL/L (ref 3.5–5.1)
PROT SERPL-MCNC: 6.8 G/DL (ref 5.7–8.2)
PROTHROMBIN TIME: 13.4 SECONDS (ref 11.6–14.8)
RBC # BLD AUTO: 4.9 X10(6)UL (ref 4.3–5.7)
RH BLOOD TYPE: POSITIVE
RH BLOOD TYPE: POSITIVE
SODIUM SERPL-SCNC: 138 MMOL/L (ref 136–145)
WBC # BLD AUTO: 6.7 X10(3) UL (ref 4–11)

## 2025-05-22 PROCEDURE — B2151ZZ FLUOROSCOPY OF LEFT HEART USING LOW OSMOLAR CONTRAST: ICD-10-PCS | Performed by: INTERNAL MEDICINE

## 2025-05-22 PROCEDURE — 71250 CT THORAX DX C-: CPT | Performed by: CLINICAL NURSE SPECIALIST

## 2025-05-22 PROCEDURE — 4A023N7 MEASUREMENT OF CARDIAC SAMPLING AND PRESSURE, LEFT HEART, PERCUTANEOUS APPROACH: ICD-10-PCS | Performed by: INTERNAL MEDICINE

## 2025-05-22 PROCEDURE — 93880 EXTRACRANIAL BILAT STUDY: CPT | Performed by: CLINICAL NURSE SPECIALIST

## 2025-05-22 PROCEDURE — B2111ZZ FLUOROSCOPY OF MULTIPLE CORONARY ARTERIES USING LOW OSMOLAR CONTRAST: ICD-10-PCS | Performed by: INTERNAL MEDICINE

## 2025-05-22 PROCEDURE — 93306 TTE W/DOPPLER COMPLETE: CPT | Performed by: CLINICAL NURSE SPECIALIST

## 2025-05-22 PROCEDURE — 71046 X-RAY EXAM CHEST 2 VIEWS: CPT | Performed by: CLINICAL NURSE SPECIALIST

## 2025-05-22 RX ORDER — LOSARTAN POTASSIUM 25 MG/1
25 TABLET ORAL DAILY
Status: DISCONTINUED | OUTPATIENT
Start: 2025-05-22 | End: 2025-05-27

## 2025-05-22 RX ORDER — MIDAZOLAM HYDROCHLORIDE 1 MG/ML
INJECTION INTRAMUSCULAR; INTRAVENOUS
Status: COMPLETED
Start: 2025-05-22 | End: 2025-05-22

## 2025-05-22 RX ORDER — SODIUM CHLORIDE 9 MG/ML
3 INJECTION, SOLUTION INTRAVENOUS
Status: COMPLETED | OUTPATIENT
Start: 2025-05-22 | End: 2025-05-22

## 2025-05-22 RX ORDER — LEVOTHYROXINE SODIUM 25 UG/1
25 TABLET ORAL
Status: DISCONTINUED | OUTPATIENT
Start: 2025-05-23 | End: 2025-05-24

## 2025-05-22 RX ORDER — IOPAMIDOL 612 MG/ML
90 INJECTION, SOLUTION INTRAVASCULAR
Status: COMPLETED | OUTPATIENT
Start: 2025-05-22 | End: 2025-05-22

## 2025-05-22 RX ORDER — CEFAZOLIN SODIUM IN 0.9 % NACL 3 G/100 ML
3 INTRAVENOUS SOLUTION, PIGGYBACK (ML) INTRAVENOUS ONCE
Status: COMPLETED | OUTPATIENT
Start: 2025-05-23 | End: 2025-05-23

## 2025-05-22 RX ORDER — VERAPAMIL HYDROCHLORIDE 2.5 MG/ML
INJECTION INTRAVENOUS
Status: COMPLETED
Start: 2025-05-22 | End: 2025-05-22

## 2025-05-22 RX ORDER — ATORVASTATIN CALCIUM 40 MG/1
40 TABLET, FILM COATED ORAL DAILY
Status: DISCONTINUED | OUTPATIENT
Start: 2025-05-22 | End: 2025-05-24

## 2025-05-22 RX ORDER — METOPROLOL TARTRATE 25 MG/1
25 TABLET, FILM COATED ORAL
Status: DISCONTINUED | OUTPATIENT
Start: 2025-05-22 | End: 2025-05-23

## 2025-05-22 RX ORDER — HYDRALAZINE HYDROCHLORIDE 20 MG/ML
10 INJECTION INTRAMUSCULAR; INTRAVENOUS ONCE
Status: COMPLETED | OUTPATIENT
Start: 2025-05-22 | End: 2025-05-22

## 2025-05-22 RX ORDER — NITROGLYCERIN 20 MG/100ML
INJECTION INTRAVENOUS
Status: COMPLETED
Start: 2025-05-22 | End: 2025-05-22

## 2025-05-22 RX ORDER — HYDRALAZINE HYDROCHLORIDE 20 MG/ML
INJECTION INTRAMUSCULAR; INTRAVENOUS
Status: COMPLETED
Start: 2025-05-22 | End: 2025-05-22

## 2025-05-22 RX ORDER — ASCORBIC ACID 500 MG
1000 TABLET ORAL
Status: COMPLETED | OUTPATIENT
Start: 2025-05-22 | End: 2025-05-22

## 2025-05-22 RX ORDER — LIDOCAINE HYDROCHLORIDE 20 MG/ML
INJECTION, SOLUTION EPIDURAL; INFILTRATION; INTRACAUDAL; PERINEURAL
Status: COMPLETED
Start: 2025-05-22 | End: 2025-05-22

## 2025-05-22 RX ORDER — CHLORHEXIDINE GLUCONATE 40 MG/ML
SOLUTION TOPICAL
Status: COMPLETED | OUTPATIENT
Start: 2025-05-22 | End: 2025-05-22

## 2025-05-22 RX ORDER — ASPIRIN 81 MG/1
324 TABLET, CHEWABLE ORAL ONCE
Status: DISCONTINUED | OUTPATIENT
Start: 2025-05-22 | End: 2025-05-22

## 2025-05-22 RX ORDER — ASPIRIN 81 MG/1
81 TABLET ORAL DAILY
Status: DISCONTINUED | OUTPATIENT
Start: 2025-05-23 | End: 2025-05-23

## 2025-05-22 RX ORDER — HEPARIN SODIUM 1000 [USP'U]/ML
INJECTION, SOLUTION INTRAVENOUS; SUBCUTANEOUS
Status: COMPLETED
Start: 2025-05-22 | End: 2025-05-22

## 2025-05-22 RX ADMIN — METOPROLOL TARTRATE 25 MG: 25 TABLET, FILM COATED ORAL at 19:18:00

## 2025-05-22 RX ADMIN — HYDRALAZINE HYDROCHLORIDE 10 MG: 20 INJECTION INTRAMUSCULAR; INTRAVENOUS at 20:18:00

## 2025-05-22 RX ADMIN — IOPAMIDOL 90 ML: 612 INJECTION, SOLUTION INTRAVASCULAR at 10:08:00

## 2025-05-22 RX ADMIN — Medication 3 MG: at 20:21:00

## 2025-05-22 RX ADMIN — CHLORHEXIDINE GLUCONATE: 40 SOLUTION TOPICAL at 08:15:00

## 2025-05-22 RX ADMIN — SODIUM CHLORIDE 3 ML/KG/HR: 9 INJECTION, SOLUTION INTRAVENOUS at 08:15:00

## 2025-05-22 RX ADMIN — ASCORBIC ACID 1000 MG: 500 MG TABLET ORAL at 20:17:00

## 2025-05-22 NOTE — CONSULTS
[facility]  part of [facility]  [facility]  Report of Consultation    Elias Shah Patient Status:  Outpatient    1965 MRN [id number]   Location [facility] INTERVENTIONAL SUITES Attending Cesar Marion MD   Hosp Day # 0 PCP Bobby Swift MD     Date of Admission:  2025  Date of Consult:  2025    Reason for Consultation:  Left main stenosis    History of Present Illness:  Elias Shah is a a(n) 59 year old male.  History of some intermittent shortness of breath and dyspnea on exertion.  Had seen pulmonary and was evaluated for sleep apnea with testing ongoing.  He has been following with cardiology for the last 5 years due to family history of coronary artery disease and some further coronary disease and he wanted to be proactive about it.  With the symptoms, he underwent a screening coronary CT which was abnormal in the left anterior descending artery distribution.  Angiogram was recommended and performed today demonstrating significant 80% left main stenosis but no disease in the right coronary artery and no disease in the LAD or circumflex branches.  Given these findings, he is being admitted for expedited workup and evaluation for surgical coronary revascularization.  Patient is here with his wife today.  Denies any chest pain, back pain, nausea vomiting or shortness of breath.    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  No history of varicose vein procedures    Drinks 1 to 3 glasses of wine per night, no tobacco use no illicits.  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     History:  Past Medical History[1]  Past Surgical History[2]  Family History[3]   reports that he has never smoked. He has never been exposed to tobacco smoke. He has never used smokeless tobacco. He reports current alcohol use of about 6.0  standard drinks of alcohol per week. He reports that he does not use drugs.    Allergies:  Allergies[4]    Medications:  Current Hospital Medications[5]    Review of Systems:  Pertinent items are noted in HPI.    Physical Exam:  Blood pressure (!) 152/96, pulse 78, temperature 98.1 °F (36.7 °C), temperature source Temporal, resp. rate 21, height 5' 11\" (1.803 m), weight 269 lb (122 kg), SpO2 94%.    GENERAL: No acute distress.  VITAL SIGNS: See above.  HEENT: Trachea midline.  No jugular venous distention.  No carotid bruit.  CHEST: Chest wall is nontender.  HEART: Regular rate and rhythm without murmurs.  LUNGS: Clear to auscultation bilaterally.  ABDOMEN: Soft, nontender nondistended.  VASCULAR: Palpable radial artery pulses 2+ bilateral.  SKIN: No rash, no excessive bruising, petechiae, or purpura.  NEUROLOGIC: Cranial nerves II-XII intact without motor/sensory deficit.      Laboratory Data:  White blood cell count 7.8 hematocrit 44.1 platelet count 234 creatinine 1.15    Echocardiogram pending  Carotid duplex pending  CT chest pending      Angiogram reviewed, 80% left main stenosis    Impression and Plan:  59-year-old male with shortness of breath and dyspnea on exertion, 80% left main stenosis    Patient will benefit from revascularization.  I had a long discussion with the patient, wife, and daughter who was on the phone.  Using pictures, I explained the nature of his heart disease.  I explained his current disease.  I explained treatement options of medical management, high risk PCI and surgical revascularization.  I explained the operation, median sternotomy, use of cardiopulmonary bypass machine, and cardiac arrest.  I explained the bypass operation, conduit selection, long term patency rates of mammary artery and reverse saphenous vein.  We discussed benefits. We discussed risks including but not limited to: bleeding, coagulopathy, transfusion (to which he agrees to accept after discussing risks of  transfusion), infection, sternal dehiscence, prolonged ventilation, myocardial infarction, stroke, arrhythmia, atrial fibrillation, kidney injury, dialysis, multisystem organ dysfunction, imponderables and death.      Patient voiced understanding wished to proceed with surgery.  Will plan for surgery tomorrow, 5/23/2025 at 7 AM.    To complete his workup will obtain CT chest, echocardiogram and carotid duplex.    Many the patient's and his family's excellent questions are answered to their satisfaction.    Monico Robbins MD  Cardiothoracic Surgery  [facility]      Time spent on counseling/coordination of care:  20425- 80 min    Total time spent with patient:  1 Hour    Monico Robbins MD  5/22/2025  11:27 AM       [1]   Past Medical History:   ALCOHOL USE    Cervical radiculopathy    Essential hypertension    High blood pressure    High cholesterol    Hyperlipidemia   [2]   Past Surgical History:  Procedure Laterality Date    Colonoscopy  5/2015    Other surgical history  02/10/2018    tumor removal carotid    Vasectomy  2002   [3]   Family History  Problem Relation Age of Onset    Cancer Father         bladder, prostate    Heart Disorder Father         MI at age 40    Cancer Mother 81        lung, smoker    Other (hypothyroidism) Mother     Cancer Sister     Other (Rheumatoid Arthritis) Sister     Hypertension Brother     Stroke Brother         age 56 (second stroke)    Heart Attack Paternal Grandfather         age 50   [4] No Known Allergies  [5]   Current Facility-Administered Medications:     sodium chloride 0.9% infusion, 3 mL/kg/hr, Intravenous, On Call    aspirin DR tab 81 mg, 81 mg, Oral, Daily    atorvastatin (Lipitor) tab 40 mg, 40 mg, Oral, Daily    [Held by provider] losartan (Cozaar) tab 25 mg, 25 mg, Oral, Daily    [START ON 5/23/2025] levothyroxine (Synthroid) tab 25 mcg, 25 mcg, Oral, Before breakfast    metoprolol tartrate (Lopressor) tab 25 mg, 25 mg, Oral, 2x Daily(Beta Blocker)

## 2025-05-22 NOTE — IVS NOTE
Hand-Off   Procedure hand off report given to Danilo DALE.   Pt's vital signs are stable.   Pt denies any pain or discomfort  Procedural site dry and intact, no bleeding or swelling noted  Activity restriction and bedrest status reviewed     and Lisa spoke with patient/family post procedure.     Pt transferred with family and belongings

## 2025-05-22 NOTE — PROGRESS NOTES
Misc. Note    Pt scheduled for CABG with Dr. Robbins tomorrow. Written and verbal info provided to pt and his wife, who is at bedside, regarding john op expectations with all questions answered. Consents obtained/placed in chart. Orders entered. Testing in progress.

## 2025-05-22 NOTE — PROCEDURES
Cardiac Cath Procedure Note  Elias Shah Patient Status:  Outpatient    1965 MRN [id number]   Location [facility] INTERVENTIONAL SUITES Attending Cesar Marion MD   Hosp Day # 0 PCP Bobby Swift MD       Cardiologist: Cesar Marion MD  Primary Proceduralist: Cesar Marion MD  Procedure Performed: LHC, COR, and LV  Date of Procedure: 2025     Pre procedure diagnosis: Abnormal CTA with CT FFR  Post procedure diagnosis: As above    Summary of findings: Severe distal left main disease.  Normal LVEF with normal LVEDP.      Post procedure findings:  1) Left Ventriculography at 30 degrees DAVILA: LVEF: 60%, no wall motion abnormality  2) Hemodynamics: LVEDP: 18 mmHg, no gradient across aortic valve  3) Coronaries:  Left main is patent and distal segment has 80% eccentric stenosis  LAD is patent and free of obstructive disease, supplies 2 diagonals which are non-obstructive  Cx is patent and free of obstructive disease, supplies 2 OM branches which are patent  RCA is dominant and free of obstructive disease, supplies PDA and PL      Recommendations:  Surgical consultation for severe left main disease    Summary of Case: After written informed consent was obtained from the patient, patient was brought to the cardiac catheterization laboratory.  Patient was prepped and draped in the usual sterile fashion. Lidocaine 1% was used to infiltrate the right radial artery for local anesthesia and a 6 Niuean introducer sheath was inserted into the right radial artery.      Selective coronary angiography performed with TIG 4 catheter for RCA and LCA.  Angiography performed in standard projections.      Pigtail catheter was placed for LV hemodynamics and LV angiogram.    Specimen sent to: No specimen collected  Estimated blood loss: 5 cc  Closure:  TR band      IV was maintained by RN and moderate conscious sedation of versed and fentanyl was given.  Patient was assessed and monitoring of oxygen,  heart rate and blood pressure by nurse and myself during the exam 20 minutes.      Cesar Marion MD  05/22/25

## 2025-05-23 ENCOUNTER — TELEPHONE (OUTPATIENT)
Dept: INTERNAL MEDICINE CLINIC | Facility: CLINIC | Age: 60
End: 2025-05-23

## 2025-05-23 ENCOUNTER — APPOINTMENT (OUTPATIENT)
Dept: GENERAL RADIOLOGY | Facility: HOSPITAL | Age: 60
End: 2025-05-23
Attending: CLINICAL NURSE SPECIALIST
Payer: COMMERCIAL

## 2025-05-23 ENCOUNTER — ANESTHESIA (OUTPATIENT)
Dept: SURGERY | Facility: HOSPITAL | Age: 60
End: 2025-05-23
Payer: COMMERCIAL

## 2025-05-23 LAB
ANION GAP SERPL CALC-SCNC: 7 MMOL/L (ref 0–18)
APTT PPP: 25.8 SECONDS (ref 23–36)
ATRIAL RATE: 86 BPM
BASE EXCESS BLD CALC-SCNC: -0.5 MMOL/L (ref ?–2)
BASE EXCESS BLD CALC-SCNC: -0.7 MMOL/L (ref ?–2)
BASE EXCESS BLDA CALC-SCNC: -3 MMOL/L (ref ?–30)
BASE EXCESS BLDA CALC-SCNC: -3 MMOL/L (ref ?–30)
BASE EXCESS BLDA CALC-SCNC: -4 MMOL/L (ref ?–30)
BASE EXCESS BLDV CALC-SCNC: -1 MMOL/L (ref ?–30)
BASE EXCESS BLDV CALC-SCNC: -2 MMOL/L (ref ?–30)
BASE EXCESS BLDV CALC-SCNC: -3 MMOL/L (ref ?–30)
BUN BLD-MCNC: 11 MG/DL (ref 9–23)
BUN/CREAT SERPL: 11.7 (ref 10–20)
CA-I BLD-SCNC: 1.1 MMOL/L (ref 0.95–1.32)
CA-I BLDA-SCNC: 1.12 MMOL/L (ref 1.12–1.32)
CA-I BLDA-SCNC: 1.22 MMOL/L (ref 1.12–1.32)
CA-I BLDA-SCNC: 1.26 MMOL/L (ref 1.12–1.32)
CA-I BLDV-SCNC: 1.12 MMOL/L (ref 1.12–1.32)
CA-I BLDV-SCNC: 1.16 MMOL/L (ref 1.12–1.32)
CA-I BLDV-SCNC: 1.17 MMOL/L (ref 1.12–1.32)
CALCIUM BLD-MCNC: 8 MG/DL (ref 8.7–10.4)
CHLORIDE SERPL-SCNC: 109 MMOL/L (ref 98–112)
CO2 BLDA-SCNC: 24 MMOL/L (ref 22–32)
CO2 BLDV-SCNC: 24 MMOL/L (ref 22–32)
CO2 BLDV-SCNC: 25 MMOL/L (ref 22–32)
CO2 BLDV-SCNC: 25 MMOL/L (ref 22–32)
CO2 SERPL-SCNC: 23 MMOL/L (ref 21–32)
COHGB MFR BLD: 2.1 % (ref 0–3)
CREAT BLD-MCNC: 0.94 MG/DL (ref 0.7–1.3)
DEPRECATED RDW RBC AUTO: 41 FL (ref 35.1–46.3)
EGFRCR SERPLBLD CKD-EPI 2021: 93 ML/MIN/1.73M2 (ref 60–?)
ERYTHROCYTE [DISTWIDTH] IN BLOOD BY AUTOMATED COUNT: 12.2 % (ref 11–15)
FIBRINOGEN PPP-MCNC: 239 MG/DL (ref 200–480)
GLUCOSE BLD-MCNC: 146 MG/DL (ref 70–99)
GLUCOSE BLDA-MCNC: 116 MG/DL (ref 70–99)
GLUCOSE BLDA-MCNC: 160 MG/DL (ref 70–99)
GLUCOSE BLDA-MCNC: 173 MG/DL (ref 70–99)
GLUCOSE BLDC GLUCOMTR-MCNC: 111 MG/DL (ref 70–99)
GLUCOSE BLDC GLUCOMTR-MCNC: 118 MG/DL (ref 70–99)
GLUCOSE BLDC GLUCOMTR-MCNC: 118 MG/DL (ref 70–99)
GLUCOSE BLDC GLUCOMTR-MCNC: 124 MG/DL (ref 70–99)
GLUCOSE BLDC GLUCOMTR-MCNC: 125 MG/DL (ref 70–99)
GLUCOSE BLDC GLUCOMTR-MCNC: 126 MG/DL (ref 70–99)
GLUCOSE BLDC GLUCOMTR-MCNC: 131 MG/DL (ref 70–99)
GLUCOSE BLDC GLUCOMTR-MCNC: 135 MG/DL (ref 70–99)
GLUCOSE BLDC GLUCOMTR-MCNC: 135 MG/DL (ref 70–99)
GLUCOSE BLDC GLUCOMTR-MCNC: 138 MG/DL (ref 70–99)
GLUCOSE BLDC GLUCOMTR-MCNC: 161 MG/DL (ref 70–99)
GLUCOSE BLDV-MCNC: 154 MG/DL (ref 70–99)
GLUCOSE BLDV-MCNC: 169 MG/DL (ref 70–99)
GLUCOSE BLDV-MCNC: 180 MG/DL (ref 70–99)
HCO3 BLDA-SCNC: 22.6 MEQ/L (ref 22–26)
HCO3 BLDA-SCNC: 22.7 MEQ/L (ref 22–26)
HCO3 BLDA-SCNC: 22.8 MEQ/L (ref 22–26)
HCO3 BLDA-SCNC: 22.9 MEQ/L (ref 22–26)
HCO3 BLDA-SCNC: 23.5 MEQ/L (ref 22–26)
HCO3 BLDA-SCNC: 23.9 MEQ/L (ref 22–26)
HCO3 BLDA-SCNC: 24.3 MEQ/L (ref 21–27)
HCO3 BLDA-SCNC: 24.5 MEQ/L (ref 21–27)
HCT VFR BLD AUTO: 39.6 % (ref 39–53)
HCT VFR BLDA CALC: 35 % (ref 37–53)
HCT VFR BLDA CALC: 42 % (ref 37–53)
HCT VFR BLDA CALC: 46 % (ref 37–53)
HCT VFR BLDV CALC: 33 % (ref 37–53)
HCT VFR BLDV CALC: 34 % (ref 37–53)
HCT VFR BLDV CALC: 36 % (ref 37–53)
HGB BLD-MCNC: 13.9 G/DL (ref 13–17.5)
HGB BLD-MCNC: 14.2 G/DL (ref 13–17.5)
INR BLD: 1.25 (ref 0.8–1.2)
LACTATE BLD-SCNC: 1.6 MMOL/L (ref 0.5–2)
MAGNESIUM SERPL-MCNC: 1.7 MG/DL (ref 1.6–2.6)
MAGNESIUM SERPL-MCNC: 1.8 MG/DL (ref 1.6–2.6)
MCH RBC QN AUTO: 32 PG (ref 26–34)
MCHC RBC AUTO-ENTMCNC: 35.1 G/DL (ref 31–37)
MCV RBC AUTO: 91.2 FL (ref 80–100)
METHGB MFR BLD: 1.2 % SAT (ref 0.4–1.5)
NITRIC OXIDE: 5 PPM
O2 CT BLD-SCNC: 17.7 VOL% (ref 15–23)
O2 CT BLD-SCNC: 19 VOL% (ref 15–23)
O2/TOTAL GAS SETTING VFR VENT: 50 %
O2/TOTAL GAS SETTING VFR VENT: 60 %
OSMOLALITY SERPL CALC.SUM OF ELEC: 290 MOSM/KG (ref 275–295)
P AXIS: -4 DEGREES
P-R INTERVAL: 196 MS
PCO2 BLDA: 34 MM HG (ref 35–45)
PCO2 BLDA: 40 MM HG (ref 35–45)
PCO2 BLDA: 40.3 MMHG (ref 35–45)
PCO2 BLDA: 41.9 MMHG (ref 35–45)
PCO2 BLDA: 45.8 MMHG (ref 35–45)
PCO2 BLDV: 38.1 MMHG (ref 38–50)
PCO2 BLDV: 39.5 MMHG (ref 38–50)
PCO2 BLDV: 43.9 MMHG (ref 38–50)
PEEP SETTING VENT: 5 CM H2O
PEEP SETTING VENT: 5 CM H2O
PH BLDA: 7.3 [PH] (ref 7.35–7.45)
PH BLDA: 7.35 [PH] (ref 7.35–7.45)
PH BLDA: 7.36 [PH] (ref 7.35–7.45)
PH BLDA: 7.39 [PH] (ref 7.35–7.45)
PH BLDA: 7.44 [PH] (ref 7.35–7.45)
PH BLDV: 7.34 [PH] (ref 7.32–7.43)
PH BLDV: 7.37 [PH] (ref 7.32–7.43)
PH BLDV: 7.41 [PH] (ref 7.32–7.43)
PLATELET # BLD AUTO: 185 10(3)UL (ref 150–450)
PO2 BLDA: 105 MM HG (ref 80–100)
PO2 BLDA: 114 MMHG (ref 80–105)
PO2 BLDA: 139 MMHG (ref 80–105)
PO2 BLDA: 192 MMHG (ref 80–105)
PO2 BLDA: 85 MM HG (ref 80–100)
PO2 BLDV: <70 MMHG (ref 35–40)
POTASSIUM BLD-SCNC: 4.2 MMOL/L (ref 3.6–5.1)
POTASSIUM SERPL-SCNC: 3.7 MMOL/L (ref 3.5–5.1)
POTASSIUM SERPL-SCNC: 4.3 MMOL/L (ref 3.5–5.1)
PRESSURE SUPPORT SETTING VENT: 10 CM H2O
PRESSURE SUPPORT SETTING VENT: 5 CM H2O
PROTHROMBIN TIME: 16.4 SECONDS (ref 11.6–14.8)
PUNCTURE CHARGE: NO
PUNCTURE CHARGE: NO
Q-T INTERVAL: 408 MS
QRS DURATION: 122 MS
QTC CALCULATION (BEZET): 488 MS
R AXIS: -1 DEGREES
RBC # BLD AUTO: 4.34 X10(6)UL (ref 4.3–5.7)
RESP RATE: 12 BPM
SAO2 % BLDA: 100 % (ref 92–100)
SAO2 % BLDA: 98 % (ref 92–100)
SAO2 % BLDA: 98 % (ref 94–100)
SAO2 % BLDA: 99 % (ref 92–100)
SAO2 % BLDA: >99 % (ref 94–100)
SAO2 % BLDV: 70 % (ref 60–85)
SAO2 % BLDV: 82 % (ref 60–85)
SAO2 % BLDV: 85 % (ref 60–85)
SODIUM BLD-SCNC: 136 MMOL/L (ref 135–145)
SODIUM BLDA-SCNC: 136 MMOL/L (ref 136–145)
SODIUM BLDA-SCNC: 136 MMOL/L (ref 136–145)
SODIUM BLDA-SCNC: 138 MMOL/L (ref 136–145)
SODIUM BLDA-SCNC: 3.7 MMOL/L (ref 3.6–5.1)
SODIUM BLDA-SCNC: 4.1 MMOL/L (ref 3.6–5.1)
SODIUM BLDA-SCNC: 4.9 MMOL/L (ref 3.6–5.1)
SODIUM BLDV-SCNC: 137 MMOL/L (ref 136–145)
SODIUM BLDV-SCNC: 138 MMOL/L (ref 136–145)
SODIUM BLDV-SCNC: 138 MMOL/L (ref 136–145)
SODIUM BLDV-SCNC: 4.9 MMOL/L (ref 3.6–5.1)
SODIUM BLDV-SCNC: 4.9 MMOL/L (ref 3.6–5.1)
SODIUM BLDV-SCNC: 5.6 MMOL/L (ref 3.6–5.1)
SODIUM SERPL-SCNC: 139 MMOL/L (ref 136–145)
SPECIMEN VOL 24H UR: 550 ML
T AXIS: 4 DEGREES
TSI SER-ACNC: 3.08 UIU/ML (ref 0.55–4.78)
VENTRICULAR RATE: 86 BPM
WBC # BLD AUTO: 16.3 X10(3) UL (ref 4–11)

## 2025-05-23 PROCEDURE — 02100Z9 BYPASS CORONARY ARTERY, ONE ARTERY FROM LEFT INTERNAL MAMMARY, OPEN APPROACH: ICD-10-PCS | Performed by: THORACIC SURGERY (CARDIOTHORACIC VASCULAR SURGERY)

## 2025-05-23 PROCEDURE — 99223 1ST HOSP IP/OBS HIGH 75: CPT | Performed by: INTERNAL MEDICINE

## 2025-05-23 PROCEDURE — 99223 1ST HOSP IP/OBS HIGH 75: CPT | Performed by: HOSPITALIST

## 2025-05-23 PROCEDURE — 021009W BYPASS CORONARY ARTERY, ONE ARTERY FROM AORTA WITH AUTOLOGOUS VENOUS TISSUE, OPEN APPROACH: ICD-10-PCS | Performed by: THORACIC SURGERY (CARDIOTHORACIC VASCULAR SURGERY)

## 2025-05-23 PROCEDURE — 06BQ4ZZ EXCISION OF LEFT SAPHENOUS VEIN, PERCUTANEOUS ENDOSCOPIC APPROACH: ICD-10-PCS | Performed by: THORACIC SURGERY (CARDIOTHORACIC VASCULAR SURGERY)

## 2025-05-23 PROCEDURE — B24BZZ4 ULTRASONOGRAPHY OF HEART WITH AORTA, TRANSESOPHAGEAL: ICD-10-PCS | Performed by: THORACIC SURGERY (CARDIOTHORACIC VASCULAR SURGERY)

## 2025-05-23 PROCEDURE — 5A1221Z PERFORMANCE OF CARDIAC OUTPUT, CONTINUOUS: ICD-10-PCS | Performed by: THORACIC SURGERY (CARDIOTHORACIC VASCULAR SURGERY)

## 2025-05-23 PROCEDURE — 71045 X-RAY EXAM CHEST 1 VIEW: CPT | Performed by: CLINICAL NURSE SPECIALIST

## 2025-05-23 DEVICE — PLATE BNE 8 H JL SHP STERNALOCK BLU PRI CLSR: Type: IMPLANTABLE DEVICE | Site: CHEST | Status: FUNCTIONAL

## 2025-05-23 DEVICE — PLATE BNE 8 H BLU X PRI CLSR SYS STERNALOCK: Type: IMPLANTABLE DEVICE | Site: CHEST | Status: FUNCTIONAL

## 2025-05-23 DEVICE — SCREW BNE 2.4X14MM CANC LOK SLF DRL: Type: IMPLANTABLE DEVICE | Site: CHEST | Status: FUNCTIONAL

## 2025-05-23 DEVICE — SCREW BNE 2.4X16MM CANC LOK SLF DRL: Type: IMPLANTABLE DEVICE | Site: CHEST | Status: FUNCTIONAL

## 2025-05-23 RX ORDER — CEFAZOLIN SODIUM 1 G/3ML
INJECTION, POWDER, FOR SOLUTION INTRAMUSCULAR; INTRAVENOUS AS NEEDED
Status: DISCONTINUED | OUTPATIENT
Start: 2025-05-23 | End: 2025-05-23 | Stop reason: HOSPADM

## 2025-05-23 RX ORDER — DOXEPIN HYDROCHLORIDE 50 MG/1
1 CAPSULE ORAL DAILY
Status: DISCONTINUED | OUTPATIENT
Start: 2025-05-24 | End: 2025-05-27

## 2025-05-23 RX ORDER — MAGNESIUM SULFATE HEPTAHYDRATE 40 MG/ML
2 INJECTION, SOLUTION INTRAVENOUS AS NEEDED
Status: DISCONTINUED | OUTPATIENT
Start: 2025-05-23 | End: 2025-05-27

## 2025-05-23 RX ORDER — AMIODARONE HYDROCHLORIDE 200 MG/1
200 TABLET ORAL 2 TIMES DAILY WITH MEALS
Status: DISCONTINUED | OUTPATIENT
Start: 2025-05-24 | End: 2025-05-27

## 2025-05-23 RX ORDER — MORPHINE SULFATE 4 MG/ML
2 INJECTION, SOLUTION INTRAMUSCULAR; INTRAVENOUS EVERY 2 HOUR PRN
Status: DISCONTINUED | OUTPATIENT
Start: 2025-05-23 | End: 2025-05-27

## 2025-05-23 RX ORDER — ROCURONIUM BROMIDE 10 MG/ML
INJECTION, SOLUTION INTRAVENOUS AS NEEDED
Status: DISCONTINUED | OUTPATIENT
Start: 2025-05-23 | End: 2025-05-23 | Stop reason: SURG

## 2025-05-23 RX ORDER — DOBUTAMINE HYDROCHLORIDE 200 MG/100ML
INJECTION INTRAVENOUS CONTINUOUS PRN
Status: DISCONTINUED | OUTPATIENT
Start: 2025-05-23 | End: 2025-05-23

## 2025-05-23 RX ORDER — SODIUM CHLORIDE 9 MG/ML
INJECTION, SOLUTION INTRAVENOUS CONTINUOUS PRN
Status: DISCONTINUED | OUTPATIENT
Start: 2025-05-23 | End: 2025-05-23 | Stop reason: SURG

## 2025-05-23 RX ORDER — ENOXAPARIN SODIUM 100 MG/ML
40 INJECTION SUBCUTANEOUS DAILY
Status: DISCONTINUED | OUTPATIENT
Start: 2025-05-24 | End: 2025-05-26

## 2025-05-23 RX ORDER — EPHEDRINE SULFATE 50 MG/ML
INJECTION INTRAVENOUS AS NEEDED
Status: DISCONTINUED | OUTPATIENT
Start: 2025-05-23 | End: 2025-05-23 | Stop reason: SURG

## 2025-05-23 RX ORDER — SODIUM CHLORIDE, SODIUM LACTATE, POTASSIUM CHLORIDE, CALCIUM CHLORIDE 600; 310; 30; 20 MG/100ML; MG/100ML; MG/100ML; MG/100ML
INJECTION, SOLUTION INTRAVENOUS CONTINUOUS PRN
Status: DISCONTINUED | OUTPATIENT
Start: 2025-05-23 | End: 2025-05-23 | Stop reason: SURG

## 2025-05-23 RX ORDER — DEXMEDETOMIDINE HYDROCHLORIDE 4 UG/ML
INJECTION, SOLUTION INTRAVENOUS CONTINUOUS
Status: DISCONTINUED | OUTPATIENT
Start: 2025-05-23 | End: 2025-05-27

## 2025-05-23 RX ORDER — DEXTROSE MONOHYDRATE 25 G/50ML
50 INJECTION, SOLUTION INTRAVENOUS
Status: DISCONTINUED | OUTPATIENT
Start: 2025-05-23 | End: 2025-05-27

## 2025-05-23 RX ORDER — DOBUTAMINE HYDROCHLORIDE 200 MG/100ML
INJECTION INTRAVENOUS CONTINUOUS PRN
Status: DISCONTINUED | OUTPATIENT
Start: 2025-05-23 | End: 2025-05-27

## 2025-05-23 RX ORDER — VANCOMYCIN HYDROCHLORIDE 1 G/20ML
INJECTION, POWDER, LYOPHILIZED, FOR SOLUTION INTRAVENOUS AS NEEDED
Status: DISCONTINUED | OUTPATIENT
Start: 2025-05-23 | End: 2025-05-23 | Stop reason: HOSPADM

## 2025-05-23 RX ORDER — ACETAMINOPHEN 325 MG/1
650 TABLET ORAL EVERY 4 HOURS PRN
Status: DISCONTINUED | OUTPATIENT
Start: 2025-05-23 | End: 2025-05-27

## 2025-05-23 RX ORDER — METOPROLOL TARTRATE 25 MG/1
25 TABLET, FILM COATED ORAL
Status: DISCONTINUED | OUTPATIENT
Start: 2025-05-24 | End: 2025-05-27

## 2025-05-23 RX ORDER — NICOTINE POLACRILEX 4 MG
15 LOZENGE BUCCAL
Status: DISCONTINUED | OUTPATIENT
Start: 2025-05-23 | End: 2025-05-27

## 2025-05-23 RX ORDER — ASCORBIC ACID 500 MG
500 TABLET ORAL 3 TIMES DAILY
Status: DISCONTINUED | OUTPATIENT
Start: 2025-05-24 | End: 2025-05-27

## 2025-05-23 RX ORDER — ACETAMINOPHEN 10 MG/ML
1000 INJECTION, SOLUTION INTRAVENOUS EVERY 8 HOURS
Status: COMPLETED | OUTPATIENT
Start: 2025-05-23 | End: 2025-05-24

## 2025-05-23 RX ORDER — FAMOTIDINE 20 MG/1
20 TABLET, FILM COATED ORAL 2 TIMES DAILY
Status: DISCONTINUED | OUTPATIENT
Start: 2025-05-23 | End: 2025-05-27

## 2025-05-23 RX ORDER — FAMOTIDINE 10 MG/ML
20 INJECTION, SOLUTION INTRAVENOUS 2 TIMES DAILY
Status: DISCONTINUED | OUTPATIENT
Start: 2025-05-23 | End: 2025-05-27

## 2025-05-23 RX ORDER — CLOPIDOGREL BISULFATE 75 MG/1
75 TABLET ORAL DAILY
Status: DISCONTINUED | OUTPATIENT
Start: 2025-05-24 | End: 2025-05-27

## 2025-05-23 RX ORDER — HYDROCODONE BITARTRATE AND ACETAMINOPHEN 5; 325 MG/1; MG/1
2 TABLET ORAL EVERY 4 HOURS PRN
Status: DISCONTINUED | OUTPATIENT
Start: 2025-05-23 | End: 2025-05-27

## 2025-05-23 RX ORDER — SENNOSIDES 8.6 MG
8.6 TABLET ORAL 2 TIMES DAILY
Status: DISCONTINUED | OUTPATIENT
Start: 2025-05-24 | End: 2025-05-27

## 2025-05-23 RX ORDER — HYDROCODONE BITARTRATE AND ACETAMINOPHEN 5; 325 MG/1; MG/1
1 TABLET ORAL EVERY 4 HOURS PRN
Status: DISCONTINUED | OUTPATIENT
Start: 2025-05-23 | End: 2025-05-27

## 2025-05-23 RX ORDER — CHLORHEXIDINE GLUCONATE ORAL RINSE 1.2 MG/ML
15 SOLUTION DENTAL 2 TIMES DAILY
Status: DISCONTINUED | OUTPATIENT
Start: 2025-05-23 | End: 2025-05-27

## 2025-05-23 RX ORDER — LIDOCAINE HYDROCHLORIDE 10 MG/ML
INJECTION, SOLUTION INFILTRATION; PERINEURAL
Status: COMPLETED | OUTPATIENT
Start: 2025-05-23 | End: 2025-05-23

## 2025-05-23 RX ORDER — MORPHINE SULFATE 4 MG/ML
4 INJECTION, SOLUTION INTRAMUSCULAR; INTRAVENOUS EVERY 2 HOUR PRN
Status: DISCONTINUED | OUTPATIENT
Start: 2025-05-23 | End: 2025-05-27

## 2025-05-23 RX ORDER — DEXMEDETOMIDINE HYDROCHLORIDE 4 UG/ML
INJECTION, SOLUTION INTRAVENOUS CONTINUOUS PRN
Status: DISCONTINUED | OUTPATIENT
Start: 2025-05-23 | End: 2025-05-23 | Stop reason: SURG

## 2025-05-23 RX ORDER — ALBUMIN HUMAN 50 G/1000ML
12.5 SOLUTION INTRAVENOUS ONCE
Status: COMPLETED | OUTPATIENT
Start: 2025-05-23 | End: 2025-05-23

## 2025-05-23 RX ORDER — METOCLOPRAMIDE HYDROCHLORIDE 5 MG/ML
10 INJECTION INTRAMUSCULAR; INTRAVENOUS EVERY 6 HOURS
Status: DISCONTINUED | OUTPATIENT
Start: 2025-05-23 | End: 2025-05-27

## 2025-05-23 RX ORDER — MIDAZOLAM HYDROCHLORIDE 1 MG/ML
INJECTION INTRAMUSCULAR; INTRAVENOUS AS NEEDED
Status: DISCONTINUED | OUTPATIENT
Start: 2025-05-23 | End: 2025-05-23 | Stop reason: SURG

## 2025-05-23 RX ORDER — POLYETHYLENE GLYCOL 3350 17 G/17G
17 POWDER, FOR SOLUTION ORAL DAILY PRN
Status: DISCONTINUED | OUTPATIENT
Start: 2025-05-23 | End: 2025-05-27

## 2025-05-23 RX ORDER — BISACODYL 10 MG
10 SUPPOSITORY, RECTAL RECTAL
Status: DISCONTINUED | OUTPATIENT
Start: 2025-05-23 | End: 2025-05-27

## 2025-05-23 RX ORDER — POTASSIUM CHLORIDE 14.9 MG/ML
20 INJECTION INTRAVENOUS AS NEEDED
Status: DISCONTINUED | OUTPATIENT
Start: 2025-05-23 | End: 2025-05-27

## 2025-05-23 RX ORDER — HEPARIN SODIUM 1000 [USP'U]/ML
INJECTION, SOLUTION INTRAVENOUS; SUBCUTANEOUS AS NEEDED
Status: DISCONTINUED | OUTPATIENT
Start: 2025-05-23 | End: 2025-05-23 | Stop reason: SURG

## 2025-05-23 RX ORDER — DOCUSATE SODIUM 100 MG/1
100 CAPSULE, LIQUID FILLED ORAL 2 TIMES DAILY
Status: DISCONTINUED | OUTPATIENT
Start: 2025-05-24 | End: 2025-05-27

## 2025-05-23 RX ORDER — ONDANSETRON 2 MG/ML
4 INJECTION INTRAMUSCULAR; INTRAVENOUS EVERY 6 HOURS PRN
Status: DISCONTINUED | OUTPATIENT
Start: 2025-05-23 | End: 2025-05-27

## 2025-05-23 RX ORDER — PHENYLEPHRINE HCL 10 MG/ML
VIAL (ML) INJECTION AS NEEDED
Status: DISCONTINUED | OUTPATIENT
Start: 2025-05-23 | End: 2025-05-23 | Stop reason: SURG

## 2025-05-23 RX ORDER — NICOTINE POLACRILEX 4 MG
30 LOZENGE BUCCAL
Status: DISCONTINUED | OUTPATIENT
Start: 2025-05-23 | End: 2025-05-27

## 2025-05-23 RX ORDER — PROTAMINE SULFATE 10 MG/ML
INJECTION, SOLUTION INTRAVENOUS AS NEEDED
Status: DISCONTINUED | OUTPATIENT
Start: 2025-05-23 | End: 2025-05-23 | Stop reason: SURG

## 2025-05-23 RX ORDER — DEXTROSE MONOHYDRATE AND SODIUM CHLORIDE 5; .45 G/100ML; G/100ML
INJECTION, SOLUTION INTRAVENOUS CONTINUOUS
Status: DISCONTINUED | OUTPATIENT
Start: 2025-05-23 | End: 2025-05-24

## 2025-05-23 RX ORDER — ALBUMIN HUMAN 50 G/1000ML
12.5 SOLUTION INTRAVENOUS ONCE AS NEEDED
Status: DISPENSED | OUTPATIENT
Start: 2025-05-23 | End: 2025-05-24

## 2025-05-23 RX ORDER — SODIUM CHLORIDE 9 MG/ML
INJECTION, SOLUTION INTRAVENOUS CONTINUOUS
Status: DISCONTINUED | OUTPATIENT
Start: 2025-05-23 | End: 2025-05-27

## 2025-05-23 RX ORDER — MAGNESIUM SULFATE 1 G/100ML
1 INJECTION INTRAVENOUS AS NEEDED
Status: DISCONTINUED | OUTPATIENT
Start: 2025-05-23 | End: 2025-05-27

## 2025-05-23 RX ORDER — NITROGLYCERIN 20 MG/100ML
INJECTION INTRAVENOUS CONTINUOUS PRN
Status: DISCONTINUED | OUTPATIENT
Start: 2025-05-23 | End: 2025-05-27

## 2025-05-23 RX ORDER — ASPIRIN 81 MG/1
81 TABLET ORAL DAILY
Status: DISCONTINUED | OUTPATIENT
Start: 2025-05-24 | End: 2025-05-27

## 2025-05-23 RX ORDER — POTASSIUM CHLORIDE 29.8 MG/ML
40 INJECTION INTRAVENOUS AS NEEDED
Status: DISCONTINUED | OUTPATIENT
Start: 2025-05-23 | End: 2025-05-27

## 2025-05-23 RX ORDER — DOBUTAMINE HYDROCHLORIDE 200 MG/100ML
INJECTION INTRAVENOUS CONTINUOUS PRN
Status: DISCONTINUED | OUTPATIENT
Start: 2025-05-23 | End: 2025-05-23 | Stop reason: SURG

## 2025-05-23 RX ADMIN — PHENYLEPHRINE HCL 100 MCG: 10 MG/ML VIAL (ML) INJECTION at 07:37:00

## 2025-05-23 RX ADMIN — SODIUM CHLORIDE, SODIUM LACTATE, POTASSIUM CHLORIDE, CALCIUM CHLORIDE: 600; 310; 30; 20 INJECTION, SOLUTION INTRAVENOUS at 07:09:00

## 2025-05-23 RX ADMIN — POTASSIUM CHLORIDE 20 MEQ: 14.9 INJECTION INTRAVENOUS at 23:29:00

## 2025-05-23 RX ADMIN — DOBUTAMINE HYDROCHLORIDE 1 MCG/KG/MIN: 200 INJECTION INTRAVENOUS at 09:29:00

## 2025-05-23 RX ADMIN — DEXMEDETOMIDINE HYDROCHLORIDE 0.8 MCG/KG/HR: 4 INJECTION, SOLUTION INTRAVENOUS at 11:30:00

## 2025-05-23 RX ADMIN — ROCURONIUM BROMIDE 70 MG: 10 INJECTION, SOLUTION INTRAVENOUS at 07:21:00

## 2025-05-23 RX ADMIN — SODIUM CHLORIDE: 9 INJECTION, SOLUTION INTRAVENOUS at 11:40:00

## 2025-05-23 RX ADMIN — FAMOTIDINE 20 MG: 10 INJECTION, SOLUTION INTRAVENOUS at 20:32:00

## 2025-05-23 RX ADMIN — SODIUM CHLORIDE: 9 INJECTION, SOLUTION INTRAVENOUS at 09:39:00

## 2025-05-23 RX ADMIN — ROCURONIUM BROMIDE 30 MG: 10 INJECTION, SOLUTION INTRAVENOUS at 08:02:00

## 2025-05-23 RX ADMIN — EPHEDRINE SULFATE 5 MG: 50 INJECTION INTRAVENOUS at 08:11:00

## 2025-05-23 RX ADMIN — PHENYLEPHRINE HCL 100 MCG: 10 MG/ML VIAL (ML) INJECTION at 07:45:00

## 2025-05-23 RX ADMIN — DEXMEDETOMIDINE HYDROCHLORIDE 0.4 MCG/KG/HR: 4 INJECTION, SOLUTION INTRAVENOUS at 14:30:00

## 2025-05-23 RX ADMIN — DOBUTAMINE HYDROCHLORIDE 1 MCG/KG/MIN: 200 INJECTION INTRAVENOUS at 18:39:00

## 2025-05-23 RX ADMIN — ROCURONIUM BROMIDE 20 MG: 10 INJECTION, SOLUTION INTRAVENOUS at 09:22:00

## 2025-05-23 RX ADMIN — HEPARIN SODIUM 48000 UNITS: 1000 INJECTION, SOLUTION INTRAVENOUS; SUBCUTANEOUS at 09:21:00

## 2025-05-23 RX ADMIN — LIDOCAINE HYDROCHLORIDE 1 ML: 10 INJECTION, SOLUTION INFILTRATION; PERINEURAL at 07:10:00

## 2025-05-23 RX ADMIN — METOCLOPRAMIDE HYDROCHLORIDE 10 MG: 5 INJECTION INTRAMUSCULAR; INTRAVENOUS at 18:53:00

## 2025-05-23 RX ADMIN — DOBUTAMINE HYDROCHLORIDE 2 MCG/KG/MIN: 200 INJECTION INTRAVENOUS at 13:00:00

## 2025-05-23 RX ADMIN — SODIUM CHLORIDE: 9 INJECTION, SOLUTION INTRAVENOUS at 13:26:00

## 2025-05-23 RX ADMIN — PHENYLEPHRINE HCL 100 MCG: 10 MG/ML VIAL (ML) INJECTION at 08:11:00

## 2025-05-23 RX ADMIN — DOBUTAMINE HYDROCHLORIDE 0.5 MCG/KG/MIN: 200 INJECTION INTRAVENOUS at 23:10:00

## 2025-05-23 RX ADMIN — MIDAZOLAM HYDROCHLORIDE 2 MG: 1 INJECTION INTRAMUSCULAR; INTRAVENOUS at 07:10:00

## 2025-05-23 RX ADMIN — ALBUMIN HUMAN 12.5 G: 50 SOLUTION INTRAVENOUS at 14:23:00

## 2025-05-23 RX ADMIN — MAGNESIUM SULFATE 1 G: 1 INJECTION INTRAVENOUS at 13:48:00

## 2025-05-23 RX ADMIN — CEFAZOLIN SODIUM IN 0.9 % NACL 3 G: 3 G/100 ML INTRAVENOUS SOLUTION, PIGGYBACK (ML) INTRAVENOUS at 07:46:00

## 2025-05-23 RX ADMIN — DEXMEDETOMIDINE HYDROCHLORIDE 0.8 MCG/KG/HR: 4 INJECTION, SOLUTION INTRAVENOUS at 07:57:00

## 2025-05-23 RX ADMIN — ACETAMINOPHEN 1000 MG: 10 INJECTION, SOLUTION INTRAVENOUS at 13:28:00

## 2025-05-23 RX ADMIN — SODIUM CHLORIDE: 9 INJECTION, SOLUTION INTRAVENOUS at 10:26:00

## 2025-05-23 RX ADMIN — SODIUM CHLORIDE: 9 INJECTION, SOLUTION INTRAVENOUS at 08:40:00

## 2025-05-23 RX ADMIN — SODIUM CHLORIDE: 9 INJECTION, SOLUTION INTRAVENOUS at 07:36:00

## 2025-05-23 RX ADMIN — ROCURONIUM BROMIDE 10 MG: 10 INJECTION, SOLUTION INTRAVENOUS at 10:01:00

## 2025-05-23 RX ADMIN — ROCURONIUM BROMIDE 20 MG: 10 INJECTION, SOLUTION INTRAVENOUS at 10:56:00

## 2025-05-23 RX ADMIN — ROCURONIUM BROMIDE 20 MG: 10 INJECTION, SOLUTION INTRAVENOUS at 10:40:00

## 2025-05-23 RX ADMIN — ACETAMINOPHEN 1000 MG: 10 INJECTION, SOLUTION INTRAVENOUS at 20:32:00

## 2025-05-23 RX ADMIN — MORPHINE SULFATE 4 MG: 4 INJECTION, SOLUTION INTRAMUSCULAR; INTRAVENOUS at 23:33:00

## 2025-05-23 RX ADMIN — CHLORHEXIDINE GLUCONATE ORAL RINSE 15 ML: 1.2 SOLUTION DENTAL at 20:31:00

## 2025-05-23 RX ADMIN — DOBUTAMINE HYDROCHLORIDE 2 MCG/KG/MIN: 200 INJECTION INTRAVENOUS at 09:04:00

## 2025-05-23 RX ADMIN — DOBUTAMINE HYDROCHLORIDE 2.5 MCG/KG/MIN: 200 INJECTION INTRAVENOUS at 09:15:00

## 2025-05-23 RX ADMIN — ALBUMIN HUMAN 12.5 G: 50 SOLUTION INTRAVENOUS at 15:12:00

## 2025-05-23 RX ADMIN — DOBUTAMINE HYDROCHLORIDE 2 MCG/KG/MIN: 200 INJECTION INTRAVENOUS at 09:26:00

## 2025-05-23 RX ADMIN — DOBUTAMINE HYDROCHLORIDE 2 MCG/KG/MIN: 200 INJECTION INTRAVENOUS at 15:34:00

## 2025-05-23 RX ADMIN — PHENYLEPHRINE HCL 100 MCG: 10 MG/ML VIAL (ML) INJECTION at 07:25:00

## 2025-05-23 RX ADMIN — PROTAMINE SULFATE 480 MG: 10 INJECTION, SOLUTION INTRAVENOUS at 11:05:00

## 2025-05-23 RX ADMIN — PHENYLEPHRINE HCL 100 MCG: 10 MG/ML VIAL (ML) INJECTION at 07:54:00

## 2025-05-23 RX ADMIN — DEXTROSE MONOHYDRATE AND SODIUM CHLORIDE 40 ML/HR: 5; .45 INJECTION, SOLUTION INTRAVENOUS at 13:26:00

## 2025-05-23 RX ADMIN — MORPHINE SULFATE 4 MG: 4 INJECTION, SOLUTION INTRAMUSCULAR; INTRAVENOUS at 17:34:00

## 2025-05-23 RX ADMIN — PHENYLEPHRINE HCL 250 MCG: 10 MG/ML VIAL (ML) INJECTION at 09:44:00

## 2025-05-23 RX ADMIN — ROCURONIUM BROMIDE 20 MG: 10 INJECTION, SOLUTION INTRAVENOUS at 08:43:00

## 2025-05-23 RX ADMIN — DOBUTAMINE HYDROCHLORIDE 1 MCG/KG/MIN: 200 INJECTION INTRAVENOUS at 10:50:00

## 2025-05-23 RX ADMIN — METOCLOPRAMIDE HYDROCHLORIDE 10 MG: 5 INJECTION INTRAMUSCULAR; INTRAVENOUS at 13:28:00

## 2025-05-23 RX ADMIN — MAGNESIUM SULFATE 1 G: 1 INJECTION INTRAVENOUS at 22:54:00

## 2025-05-23 RX ADMIN — DEXMEDETOMIDINE HYDROCHLORIDE 0.2 MCG/KG/HR: 4 INJECTION, SOLUTION INTRAVENOUS at 15:39:00

## 2025-05-23 RX ADMIN — PHENYLEPHRINE HCL 200 MCG: 10 MG/ML VIAL (ML) INJECTION at 11:11:00

## 2025-05-23 RX ADMIN — SODIUM CHLORIDE, SODIUM LACTATE, POTASSIUM CHLORIDE, CALCIUM CHLORIDE: 600; 310; 30; 20 INJECTION, SOLUTION INTRAVENOUS at 11:40:00

## 2025-05-23 RX ADMIN — DEXMEDETOMIDINE HYDROCHLORIDE 0.8 MCG/KG/HR: 4 INJECTION, SOLUTION INTRAVENOUS at 12:45:00

## 2025-05-23 RX ADMIN — SODIUM CHLORIDE, SODIUM LACTATE, POTASSIUM CHLORIDE, CALCIUM CHLORIDE: 600; 310; 30; 20 INJECTION, SOLUTION INTRAVENOUS at 08:40:00

## 2025-05-23 RX ADMIN — SODIUM CHLORIDE: 9 INJECTION, SOLUTION INTRAVENOUS at 12:04:00

## 2025-05-23 RX ADMIN — DEXMEDETOMIDINE HYDROCHLORIDE 1 MCG/KG/HR: 4 INJECTION, SOLUTION INTRAVENOUS at 09:50:00

## 2025-05-23 RX ADMIN — DOBUTAMINE HYDROCHLORIDE 2 MCG/KG/MIN: 200 INJECTION INTRAVENOUS at 08:19:00

## 2025-05-23 RX ADMIN — DOBUTAMINE HYDROCHLORIDE 2 MCG/KG/MIN: 200 INJECTION INTRAVENOUS at 11:31:00

## 2025-05-23 RX ADMIN — DOBUTAMINE HYDROCHLORIDE 3 MCG/KG/MIN: 200 INJECTION INTRAVENOUS at 08:26:00

## 2025-05-23 RX ADMIN — SODIUM CHLORIDE: 9 INJECTION, SOLUTION INTRAVENOUS at 12:05:00

## 2025-05-23 RX ADMIN — CHLORHEXIDINE GLUCONATE ORAL RINSE 15 ML: 1.2 SOLUTION DENTAL at 13:28:00

## 2025-05-23 RX ADMIN — PHENYLEPHRINE HCL 100 MCG: 10 MG/ML VIAL (ML) INJECTION at 07:22:00

## 2025-05-23 RX ADMIN — FAMOTIDINE 20 MG: 10 INJECTION, SOLUTION INTRAVENOUS at 13:28:00

## 2025-05-23 RX ADMIN — SODIUM CHLORIDE, SODIUM LACTATE, POTASSIUM CHLORIDE, CALCIUM CHLORIDE: 600; 310; 30; 20 INJECTION, SOLUTION INTRAVENOUS at 10:26:00

## 2025-05-23 RX ADMIN — METOPROLOL TARTRATE 25 MG: 25 TABLET, FILM COATED ORAL at 06:18:00

## 2025-05-23 RX ADMIN — ALBUMIN HUMAN 12.5 G: 50 SOLUTION INTRAVENOUS at 23:24:00

## 2025-05-23 RX ADMIN — ROCURONIUM BROMIDE 10 MG: 10 INJECTION, SOLUTION INTRAVENOUS at 09:37:00

## 2025-05-23 RX ADMIN — PHENYLEPHRINE HCL 100 MCG: 10 MG/ML VIAL (ML) INJECTION at 09:45:00

## 2025-05-23 NOTE — PROGRESS NOTES
RESPIRATORY THERAPY MECHANICAL VENTILATION PROGRESS NOTE    Ventilator Weaning:  Patient meets criteria for weaning? yes Weaning was attempted yes using pressure support 5 cmH2O + PEEP 5 cmH2O. The patient tolerated well for 30 minutes. Patient extubated at 1705, placed with 5 LPM nasal cannula, saturating well.         05/23/25 1705   Spontaneous Parameters   Spontaneous RR Rate 16   Spontaneous Minute Volume 9.13   Average Spontaneous Tidal Volume 624   $ Spontaneous Vital Capacity 1.4   Negative Inspiratory Force 55   Total RSBI 60

## 2025-05-23 NOTE — RESPIRATORY THERAPY NOTE
Patient received intubated and on ventilator 12/550/5/50%. Bilateral breath sounds auscultated. Suction provided when indicated. No acute events during the daytime. RT will continue to monitor.

## 2025-05-23 NOTE — ANESTHESIA POSTPROCEDURE EVALUATION
Patient: Elias Shah    Procedure Summary       Date: 05/23/25 Room / Location: [facility] MAIN OR 18 / [facility] MAIN OR    Anesthesia Start: 0706 Anesthesia Stop: 1248    Procedure: CORONARY ARTERY BYPASS GRAFT x2; UTILIZING THE LEFT INTERNAL MAMMARY ARTERY TO THE LAD; SVG TO THE OM; LEFT LEG ENDOSCOPIC GREATER SAPHENOUS VEIN GRAFT HARVEST; INSERTION OF TEMPORARY VENTRICULAR PACING WIRE; INTRAOPERATIVE TRANSESOPHAGEAL ECHOCARDIOGRAM; STERNAL PLATING Diagnosis:       Coronary artery disease involving native coronary artery of native heart with other form of angina pectoris      (Coronary artery disease involving native coronary artery of native heart with other form of angina pectoris [I25.118])    Surgeons: Monico Robbins MD Anesthesiologist: Ghada Dunn MD    Anesthesia Type: general ASA Status: 4            Anesthesia Type: general    Vitals Value Taken Time   /82 05/23/25 12:47   Temp 98 °F (36.7 °C) 05/23/25 12:47   Pulse 87 05/23/25 12:47   Resp 26 05/23/25 12:47   SpO2 97 % 05/23/25 12:47   Vitals shown include unfiled device data.    [facility] AN Post Evaluation:   Patient Evaluated in ICU  Patient Participation: waiting for patient participation  Level of Consciousness: Post-procedure mental status: intubated and sedated.  Pain Management: adequate  Airway Patency:patent  Dental exam unchanged from preop  Yes    Nausea/Vomiting: none  Cardiovascular Status: acceptable and hemodynamically stable  Respiratory Status: acceptable, ETT, ventilator and intubated  Postoperative Hydration acceptable  Comments: Transported to ICU intubated and sedated, vital signs stable throughout. On arrival to ICU, pt connected to ventilator. Continued on infusions of Insulin, Propofol, Precedex, Amicar, Dobutamine, Norepi and Amiodarone.   Clevidipine off but in-line if needed.   Given 500 cc Cell Saver.   Report given to ICU nurse at bedside.         Ghada Dunn MD  5/23/2025 12:48 PM

## 2025-05-23 NOTE — DIETARY NOTE
Deferred Cardiac Education Note    Consult received for diet education per order set. Education deferred until pt transferred out of CCU or until s/p intervention and when appropriate for teaching.        Lyly Lamb, MS, RD, LDN  Clinical Dietitian  [phone]

## 2025-05-23 NOTE — ANESTHESIA PREPROCEDURE EVALUATION
Anesthesia PreOp Note    HPI:     Elias Shah is a 59 year old male who presents for preoperative consultation requested by: Monico Robbins MD    Date of Surgery: 5/22/2025 - 5/23/2025    Procedure(s):  CORONARY ARTERY BYPASS GRAFT; POSSIBLE ENDOSCOPIC VEIN HARVEST  Indication: Coronary artery disease involving native coronary artery of native heart with other form of angina pectoris [I25.118]    Relevant Problems   No relevant active problems       NPO:  Last Liquid Consumption Date: 05/22/25 (sip with meds)  Last Liquid Consumption Time: 0700  Last Solid Consumption Date: 05/21/25  Last Solid Consumption Time: 2200  Last Liquid Consumption Date: 05/22/25 (sip with meds)          History Review:  Patient Active Problem List    Diagnosis Date Noted    Abnormal cardiac CT angiography 05/22/2025    Agatston coronary artery calcium score greater than 400 09/03/2021    HTN (hypertension), benign 09/03/2021    Hyperlipidemia LDL goal <100 05/28/2021    Hip pain 05/28/2021    Right inguinal pain 05/28/2021    Elevated blood pressure reading 05/28/2021    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  Lab Results   Component Value Date    WBC 6.7 05/22/2025    RBC 4.90 05/22/2025    HGB 15.4 05/22/2025    HCT 43.9 05/22/2025    MCV 89.6 05/22/2025    MCH 31.4 05/22/2025    MCHC 35.1 05/22/2025    RDW 12.4 05/22/2025    .0 05/22/2025     Lab Results   Component Value Date     05/22/2025    K 3.9 05/22/2025     05/22/2025    CO2 26.0 05/22/2025    BUN 10 05/22/2025    CREATSERUM 1.01 05/22/2025    GLU 96 05/22/2025    CA 9.4 05/22/2025     Lab Results   Component Value Date    INR 0.97 05/22/2025       Vital Signs:  Body  mass index is 36.74 kg/m².   height is 1.803 m (5' 11\") and weight is 119.5 kg (263 lb 7.2 oz). His temporal temperature is 98 °F (36.7 °C). His blood pressure is 149/73 and his pulse is 78. His respiration is 17 and oxygen saturation is 92%.   Vitals:    05/22/25 2200 05/23/25 0000 05/23/25 0200 05/23/25 0400   BP: (!) 141/93 154/90 (!) 142/96 149/73   Pulse: 68 73 87 78   Resp: 18 17 25 17   Temp:  98.6 °F (37 °C)  98 °F (36.7 °C)   TempSrc:  Temporal  Temporal   SpO2: 96% 97% 97% 92%   Weight:       Height:            Anesthesia Evaluation     Patient summary reviewed and Nursing notes reviewed    No history of anesthetic complications   Airway   Mallampati: II  TM distance: >3 FB  Neck ROM: full  Dental          Pulmonary - normal exam   (+) sleep apnea (Has JAMES but states he has not started using CPAP yet)    ROS comment: Denies smoking or marijuana use   Cardiovascular - normal exam  Exercise tolerance: good  (+) hypertension    ROS comment: Left heart cath 5/22/25:  Summary of findings: Severe distal left main disease.  Normal LVEF with normal LVEDP.    Neuro/Psych    (+)  neuromuscular disease (cervical radiculopathy),        GI/Hepatic/Renal    (+) liver disease (20 drinks per week, last drink 3 days ago. States he has no withdrawl symptoms when abstaining)    Comments: Denies history of food getting stuck in throat    Endo/Other - negative ROS   Abdominal   (+) obese                 Anesthesia Plan:   ASA:  4  Plan:   General  Monitors and Lines:   A-line, Additonal IV, BIS, Brain oximetry, Central line, CVP, GWYN and [place]  Airway:  ETT and Video laryngoscope  Post-op Pain Management: IV analgesics  Plan Comments: Post-op intubation, a-line and central line placement discussed. He is NPO today. All anesthetic questions answered.   Informed Consent Plan and Risks Discussed With:  Patient  Use of Blood Products Discussed With:  Patient  Blood Product Use Consented    Discussed plan with:  Surgeon      I  have informed Elias Shah and/or legal guardian or family member of the nature of the anesthetic plan, benefits, risks including possible dental damage if relevant, major complications, and any alternative forms of anesthetic management.   All of the patient's questions were answered to the best of my ability. The patient desires the anesthetic management as planned.  Ghada Dunn MD  5/23/2025 6:45 AM  Present on Admission:  **None**           [1]   Past Medical History:   ALCOHOL USE    Cervical radiculopathy    Essential hypertension    High blood pressure    High cholesterol    Hyperlipidemia   [2]   Past Surgical History:  Procedure Laterality Date    Colonoscopy  5/2015    Other surgical history  02/10/2018    tumor removal carotid    Vasectomy  2002   [3]   Medications Prior to Admission   Medication Sig Dispense Refill Last Dose/Taking    DICLOFENAC 50 MG Oral Tab EC TAKE 1 TABLET BY MOUTH 3 TIMES DAILY AS NEEDED. 90 tablet 0 5/22/2025    levothyroxine 25 MCG Oral Tab Take 1 tablet (25 mcg total) by mouth before breakfast. 90 tablet 3 5/22/2025 Morning    atorvastatin 40 MG Oral Tab TAKE 1 TABLET BY MOUTH EVERY DAY 90 tablet 3 5/21/2025    aspirin (ASPIR-LOW) 81 MG Oral Tab EC Take 1 tablet (81 mg total) by mouth daily. 30 tablet 11 5/22/2025 Morning    Losartan Potassium 25 MG Oral Tab Take 1 tablet (25 mg total) by mouth daily. 30 tablet 5 5/21/2025    cyclobenzaprine 5 MG Oral Tab Take 1 tablet (5 mg total) by mouth 3 (three) times daily as needed for Muscle spasms. 30 tablet 0 More than a month    albuterol 108 (90 Base) MCG/ACT Inhalation Aero Soln Inhale 2 puffs into the lungs every 6 (six) hours as needed. 1 each 1 More than a month    fluocinolone 0.01 % External Cream 1 thin application to the affected areas twice a day as needed. 60 g 0 More than a month    triamcinolone 0.1 % External Cream Apply topically 2 (two) times daily as needed. 60 g 3 More than a month   [4]   Current  Facility-Administered Medications Ordered in Epic   Medication Dose Route Frequency Provider Last Rate Last Admin    aspirin DR tab 81 mg  81 mg Oral Daily Cesar Marion MD        atorvastatin (Lipitor) tab 40 mg  40 mg Oral Daily Cesar Marion MD        [Held by provider] losartan (Cozaar) tab 25 mg  25 mg Oral Daily Cesar Marion MD        levothyroxine (Synthroid) tab 25 mcg  25 mcg Oral Before breakfast Cesar Marion MD        metoprolol tartrate (Lopressor) tab 25 mg  25 mg Oral 2x Daily(Beta Blocker) Cesar Marion MD   25 mg at 05/23/25 0618    ceFAZolin (Ancef) 3 g in sodium chloride 0.9% 100mL IVPB premix  3 g Intravenous Once Maria Luz Davis APRN        insulin regular human (Novolin R, Humulin R) 100 Units in sodium chloride 0.9% 100 mL standard infusion (100 mL)  1-40 Units/hr Intravenous On Call to OR Monico Robbins MD        aminocaproic acid 5 g BOLUS FROM BAG infusion  5 g Intravenous On Call to OR Monico Robbins MD        And    aminocaproic acid (Amicar) 10 g in sodium chloride 0.9% 250 mL infusion  1 g/hr Intravenous On Call to OR Monico Robbins MD         No current [facility]-ordered outpatient medications on file.   [5] No Known Allergies  [6]   Family History  Problem Relation Age of Onset    Cancer Father         bladder, prostate    Heart Disorder Father         MI at age 40    Cancer Mother 81        lung, smoker    Other (hypothyroidism) Mother     Cancer Sister     Other (Rheumatoid Arthritis) Sister     Hypertension Brother     Stroke Brother         age 56 (second stroke)    Heart Attack Paternal Grandfather         age 50   [7]   Social History  Socioeconomic History    Marital status:      Spouse name: Juana     Number of children: 2   Occupational History    Occupation:     Tobacco Use    Smoking status: Never     Passive exposure: Never    Smokeless tobacco: Never    Tobacco comments:     may have a cigar once or twice a year   Vaping Use    Vaping status:  Never Used   Substance and Sexual Activity    Alcohol use: Yes     Alcohol/week: 6.0 standard drinks of alcohol     Types: 5 Glasses of wine, 1 Cans of beer per week     Comment: Have cut back on alcohol    Drug use: No

## 2025-05-23 NOTE — TELEPHONE ENCOUNTER
Saira from [facility] called to [facility] follow up for this pt.  She is asking for an appt. June 5th or 6th?  Please advise.

## 2025-05-23 NOTE — OR NURSING
Called and spoke with patient wife Juana at [phone] at 0818 to give update on start of procedure.

## 2025-05-23 NOTE — ANESTHESIA PROCEDURE NOTES
Arterial Line    Date/Time: 5/23/2025 7:10 AM    Performed by: Ghada Dunn MD  Authorized by: Ghada Dunn MD    General Information and Staff    Procedure Start:  5/23/2025 7:10 AM  Procedure End:  5/23/2025 7:19 AM  Anesthesiologist:  Ghada Dunn MD  Performed By:  Anesthesiologist  Patient Location:  OR  Indication: continuous blood pressure monitoring and blood sampling needed    Site Identification: real time ultrasound guided and surface landmarks    Preanesthetic Checklist: 2 patient identifiers, IV checked, risks and benefits discussed, monitors and equipment checked, pre-op evaluation, timeout performed, anesthesia consent and sterile technique used    Procedure Details    Catheter Size:  20 G  Catheter Length:  1 and 3/4 inch  Catheter Type:  Arrow  Seldinger Technique?: Yes    Laterality:  Left  Site:  Radial artery  Site Prep: chlorhexidine    Line Secured:  Wrist Brace, tape and Tegaderm    Assessment    Events: patient tolerated procedure well with no complications      Medications  5/23/2025 7:10 AM  lidocaine injection 1% - Intradermal   1 mL - 5/23/2025 7:10:00 AM    Additional Comments

## 2025-05-23 NOTE — OPERATIVE REPORT
Operative Note    Patient Name: Elias Shah    Preoperative Diagnosis: Coronary artery disease involving native coronary artery of native heart with other form of angina pectoris [I25.118]    Dyspnea on exertion  Critical left main stenosis    Postoperative Diagnosis: Coronary artery disease involving native coronary artery of native heart with other form of angina pectoris [I25.118]    Dyspnea on exertion  Critical left main stenosis    Surgeons and Role:     * Monico Robbins MD - Primary     Assistant: PA: Marixa Sanford PA    No otherwise qualified assistant was available to assist with vein harvesting, vein preparation, assisting, opening and closing of the operative sites.      Procedures:   Median sternotomy  Urgent coronary artery bypass grafting x 2  Left internal mammary artery to left anterior descending artery  Reverse saphenous vein graft to lateral obtuse marginal artery  Endoscopic vein harvesting, greater saphenous vein, left lower extremity  Complex sternal closure with sternal plating system, sternal lock blue    Indication:  59-year-old male with some dyspnea on exertion and shortness of breath with some activities.  Has followed cardiology for many years but recently recommended angiogram which demonstrated critical left main stenosis therefore patient was admitted for surgical revascularization.  I discussed with the patient and family alternatives, benefits and risks of surgery, he consented for surgery today.    Surgical Findings:   Left anterior descending artery 2 mm vessel good target  Lateral obtuse marginal artery 2 mm vessel, good target  Pain quality very good, 3-1/2 mm diameter good quality  Mammary artery very good, 2 mm diameter with excellent flow  Completion echocardiogram with ejection fraction 50 to 55% with no wall motion abnormalities.    Description of procedure:  Patient was taken to the operating room.  Anesthesia was induced.  Central line, swan, radial arterial  line, hernandez and transesophageal echo were placed.  Patient was then positioned, prepped, draped in the usual sterile fashion.  Arms were tucked at sides, pressure points were padded and wrist was in a neutral, thumbs up position.  Timeout then performed verifying patient, procedure, beta-blocker, antibiotic.  Skin incision made and midline sternotomy performed.  The left internal mammary artery was harvested in a pedicled fashion from the underside of the left endothoracic fascia.  Simultaneously, the greater saphenous vein was harvested in an endoscopic fashion from the left lower extremity.  Systemic heparin was given.  The distal branches of the mammary artery beyond its bifurcation were clipped and the mammary artery transected.  There was excellent pulsatile flow.  The distal ends were clipped, the pedicle was wrapped in papaverine gauze and placed in the left chest.  Sternal retractor was then placed.  Pericardium entered and pericardial well created.  The greater saphenous vein was then prepared for bypass by tying the branches and reinforcing with clips.  Patient was cannulated in the distal ascending aorta and right atrial appendage with a three-stage venous cannula.  After confirming ACT over 480, cardiopulmonary bypass commenced.  Distal anastomotic sites were inspected for feasibility.  A antegrade needle was placed in the proximal ascending aorta.  Cross-clamp was then applied, patient was arrested with 1.6 L of antegrade cold blood cardioplegia solution.  There was excellent arrest after 400 mL.  Antegrade cardioplegia was given at regular 15-20 minute intervals thruout the case.  Cold water and slush was applied to the heart and patient was cooled to 32 °C.  A notch was made in the pericardium for the mammary pedicle later in the operative case.  Phrenic nerve was identified and spared.   Heart rotated expose lateral obtuse marginal artery.  Initially somewhat difficult identify is largely  intramyocardial but just out of the groove it was identified and exposed to be a 2 mm vessel.  An arteriotomy was made and a 1 mm probe passed freely proximal and distal.  End to side anastomosis created with reverse saphenous vein and a 7-0 Prolene.  Prior to tying down, the 1 mm probe passed freely proximal and distal, after tying down there is excellent antegrade and retrograde flow upon testing.  Vein graft is then sized to the ascending aorta, transected and spatulated and anastomosed to the aorta and underside fashion with 6-0 Prolene.  Heart rotated expose the left anterior descending artery.  Arteriotomy is made in the proximal to midportion of the 2 mm vessel, a 1 mm probe passed freely proximal and distal.  The distal end of the mammary pedicle is then partially skeletonized, transected and spatulated then anastomosed to the left anterior descending artery with an 8-0 Prolene.  Prior to tying down, a 1 mm probe passed freely proximal and distal in the target vessel.  After tying down, the bulldog is removed.  Anastomosis hemostatic and Doppler semination confirms antegrade and retrograde flow in the left anterior descending artery.  The pedicle is secured to the heart with 6-0 Prolene x 2.  Right ventricular pacing wires placed and externalized.  Hotshot given antegrade and cross-clamp was removed.  Patient is defibrillated to normal sinus rhythm.  After fully rewarming, patient is slowly weaned off of cardiopulmonary bypass.  Protamine is given.  Patient decannulated, cannulation sites oversewn.  Inspection hemostasis demonstrates adequate hemostasis at cannulation sites operative sites clean elevated to anastomotic sites mammary bed and bone edges.  Attention is turned to closure.  Pericardium was reapproximated over the right and left ventricles as well as the aorta.  Chest tubes are placed, 32 [place] angled the left little space and a 32 [place] straight with the tip in the right pleural space  traversing the mediastinal space.  Sternum is then closed with multiple double stainless steel wires and reinforced with sternal plates and sternal lock blue plating system.  Wounds were all then copiously irrigated, closed multiple layers and sterile dressings are applied.  We performed bleeding trials with the patient passed that issue.  He is then taken to the ICU to critical condition on low-dose dobutamine and Levophed infusions and in normal sinus rhythm.  Sponge, needle and instrument counts were all correct at the end of the case.      Anesthesia: Cardiac    Complications: None     Implants: sternal plating system, sternal lock blue\    Specimen: None    Drains: 32 Divehi chest tubes x 2    Condition: Critical to CVICU    Estimated Blood Loss: 200 mm    Monico Robbins MD

## 2025-05-23 NOTE — ANESTHESIA PROCEDURE NOTES
Airway  Date/Time: 5/23/2025 7:23 AM  Reason: Elective    Airway not difficult    General Information and Staff   Patient location during procedure: OR  Anesthesiologist: Ghada Dunn MD  Performed: anesthesiologist   Performed by: Ghada Dunn MD  Authorized by: Ghada Dunn MD        Indications and Patient Condition  Indications for airway management: anesthesia  Sedation level: deep      Preoxygenated: yesPatient position: ramp    Mask difficulty assessment: 3 - difficult mask (inadequate, unstable or two providers) +/- NMBA  No planned trial extubation    Final Airway Details    Final airway type: endotracheal airway    Successful airway: ETT  Cuffed: yes   Successful intubation technique: Video laryngoscopy  Facilitating devices/methods: intubating stylet and cricoid pressure  Endotracheal tube insertion site: oral  Blade type: Cid.  Blade size: #4  ETT size (mm): 7.5    Cormack-Lehane Classification: grade I - full view of glottis  Placement verified by: capnometry   Measured from: lips  ETT to lips (cm): 24  Number of attempts at approach: 1  Number of other approaches attempted: 0    Additional Comments  Intubated easily on first attempt, no dental or soft tissue damage. No signs of aspiration.   Upper front caps remain intact after intubation

## 2025-05-23 NOTE — PLAN OF CARE
No acute events overnight. CHG x2. Morning metoprolol given. Shaved and sent to OR this AM.    Problem: Patient Centered Care  Goal: Patient preferences are identified and integrated in the patient's plan of care  Description: Interventions:  - What would you like us to know as we care for you?   - Provide timely, complete, and accurate information to patient/family  - Incorporate patient and family knowledge, values, beliefs, and cultural backgrounds into the planning and delivery of care  - Encourage patient/family to participate in care and decision-making at the level they choose  - Honor patient and family perspectives and choices  Outcome: Progressing     Problem: Patient/Family Goals  Goal: Patient/Family Long Term Goal  Description: Patient's Long Term Goal:     Interventions:  -   - See additional Care Plan goals for specific interventions  Outcome: Progressing  Goal: Patient/Family Short Term Goal  Description: Patient's Short Term Goal:     Interventions:   -   - See additional Care Plan goals for specific interventions  Outcome: Progressing     Problem: CARDIOVASCULAR - ADULT  Goal: Maintains optimal cardiac output and hemodynamic stability  Description: INTERVENTIONS:  - Monitor vital signs, rhythm, and trends  - Monitor for bleeding, hypotension and signs of decreased cardiac output  - Evaluate effectiveness of vasoactive medications to optimize hemodynamic stability  - Monitor arterial and/or venous puncture sites for bleeding and/or hematoma  - Assess quality of pulses, skin color and temperature  - Assess for signs of decreased coronary artery perfusion - ex. Angina  - Evaluate fluid balance, assess for edema, trend weights  Outcome: Progressing  Goal: Absence of cardiac arrhythmias or at baseline  Description: INTERVENTIONS:  - Continuous cardiac monitoring, monitor vital signs, obtain 12 lead EKG if indicated  - Evaluate effectiveness of antiarrhythmic and heart rate control medications as  ordered  - Initiate emergency measures for life threatening arrhythmias  - Monitor electrolytes and administer replacement therapy as ordered  Outcome: Progressing

## 2025-05-23 NOTE — CONSULTS
[facility]  part of [facility]    History & Physical    Elias Shah Patient Status:  Inpatient    1965 MRN [id number]   Location [facility] OPERATING ROOM Attending Eileen Munoz MD   Hosp Day # 1 PCP Bobby Swift MD     Date:  2025  Date of Admission:  2025    History provided by:medical chart  Chief Complaint:   No chief complaint on file.      HPI:   Elias Shah is a(n) 59 year old male with a PMH of HTN, HL, JAMES who underwent ct coronary angio which showed moderate to severe disease of the mid LAD with abnormal CT FFR and significant disease of the diagonal.  He was seen by cardiology in the office who recommended coronary cath.  Yesterday he had cardiac cath and it showed severe LAD disease and consult with CT surgery was recommended.  Patient was deemed a good candidate for CABG and he went to the OR this morning.  I am seeing him post op from CABG x2.  He is currently intubated and resting comfortably.     History   Past Medical History[1]  Past Surgical History[2]  Family History[3]  Social History:  Short Social Hx on File[4]  Allergies/Medications:   Allergies: Allergies[5]  Prescriptions Prior to Admission[6]    Review of Systems:   Pertinent items are noted in HPI.  12 ROS completed and otherwise negative    Physical Exam:   Vital Signs:  Blood pressure 149/73, pulse 93, temperature 98 °F (36.7 °C), temperature source Temporal, resp. rate (!) 27, height 5' 11\" (1.803 m), weight 261 lb 14.5 oz (118.8 kg), SpO2 94%.     Gen: intubated, resting comfortably  Pulm: Lungs clear, normal respiratory effort  CV: Heart with regular rate and rhythm  Abd: Abdomen soft, nontender, nondistended, bowel sounds present  Neuro: No acute focal deficits  MSK: moves extremities  Skin: Warm and dry  Psych: calm affect  Ext: no cyanosis          Results:     Lab Results   Component Value Date    WBC 6.7 2025    HGB 15.4 2025    HCT 43.9  05/22/2025    .0 05/22/2025    CREATSERUM 1.01 05/22/2025    BUN 10 05/22/2025     05/22/2025    K 3.9 05/22/2025     05/22/2025    CO2 26.0 05/22/2025    GLU 96 05/22/2025    CA 9.4 05/22/2025    ALB 4.4 05/22/2025    ALKPHO 103 05/22/2025    BILT 1.0 05/22/2025    TP 6.8 05/22/2025    AST 25 05/22/2025    ALT 58 (H) 05/22/2025    PTT 27.3 05/22/2025    INR 0.97 05/22/2025    T4F 1.1 08/02/2024    TSH 5.867 (H) 08/02/2024    PSA 1.3 12/19/2018    CRP <0.29 06/27/2023       CT CHEST (CPT=71250)  Result Date: 5/22/2025  CONCLUSION:   Mild calcific atherosclerosis thoracic aorta without evidence of aneurysm.    Dictated by (CST): Julian Gaines MD on 5/22/2025 at 1:14 PM     Finalized by (CST): Julian Gaines MD on 5/22/2025 at 1:19 PM          US CAROTID DOPPLER BILAT - DIAG IMG (CPT=93880)  Result Date: 5/22/2025  CONCLUSION:  1. Mild atherosclerosis of the carotid bifurcations without hemodynamically stenosis or occlusion.  2. Antegrade flow is documented in the vertebral arteries bilaterally.   3. Lesser incidental findings as above.    Dictated by (CST): Uzair Prescott MD on 5/22/2025 at 12:43 PM     Finalized by (CST): Uzair Prescott MD on 5/22/2025 at 12:46 PM                Assessment/Plan:       Acute CAD  - s/p CABG x2  - intubated, pulm on consult   - wound care and chest tube care per CT surgery  - plan asa, plavix, statin and bblocker    HTN  - stable, cont current meds and adjust as needed    HL  - statin    Hx of etoh abuse  - monitor ciwa    Obesity  - BMI 37  - diet and exercise when stable    Supplementary Documentation:   DVT Mechanical Prophylaxis:        DVT Pharmacologic Prophylaxis   Medication    sodium chloride 0.9% 1,000 mL with heparin (Porcine) 10,000 Units mixture (OR nurse to mix)     Facility-Administered Medications Ordered in Other Encounters (Includes Only Anticoagulants)   Medication    heparin (Porcine) 1000 UNIT/ML injection                Code Status:  Not on file  Stringer: Stringer catheter in place  Stringer Duration (in days): 1  Central line:    JONO:                        Eileen Munoz MD  5/23/2025               [1]   Past Medical History:   ALCOHOL USE    Cervical radiculopathy    Essential hypertension    High blood pressure    High cholesterol    Hyperlipidemia    Sleep apnea   [2]   Past Surgical History:  Procedure Laterality Date    Colonoscopy  5/2015    Other surgical history  02/10/2018    tumor removal carotid    Vasectomy  2002   [3]   Family History  Problem Relation Age of Onset    Cancer Father         bladder, prostate    Heart Disorder Father         MI at age 40    Cancer Mother 81        lung, smoker    Other (hypothyroidism) Mother     Cancer Sister     Other (Rheumatoid Arthritis) Sister     Hypertension Brother     Stroke Brother         age 56 (second stroke)    Heart Attack Paternal Grandfather         age 50   [4]   Social History  Socioeconomic History    Marital status:      Spouse name: Juana     Number of children: 2   Occupational History    Occupation:     Tobacco Use    Smoking status: Never     Passive exposure: Never    Smokeless tobacco: Never    Tobacco comments:     may have a cigar once or twice a year   Vaping Use    Vaping status: Never Used   Substance and Sexual Activity    Alcohol use: Yes     Alcohol/week: 20.0 standard drinks of alcohol     Types: 20 Standard drinks or equivalent per week     Comment: Have cut back on alcohol    Drug use: No     Social Drivers of Health     Food Insecurity: No Food Insecurity (5/22/2025)    NCSS - Food Insecurity     Worried About Running Out of Food in the Last Year: No     Ran Out of Food in the Last Year: No   Transportation Needs: No Transportation Needs (5/22/2025)    NCSS - Transportation     Lack of Transportation: No   Housing Stability: Not At Risk (5/22/2025)    NCSS - Housing/Utilities     Has Housing: Yes     Worried About Losing Housing: No      Unable to Get Utilities: No   [5] No Known Allergies  [6]   Medications Prior to Admission   Medication Sig    DICLOFENAC 50 MG Oral Tab EC TAKE 1 TABLET BY MOUTH 3 TIMES DAILY AS NEEDED.    levothyroxine 25 MCG Oral Tab Take 1 tablet (25 mcg total) by mouth before breakfast.    atorvastatin 40 MG Oral Tab TAKE 1 TABLET BY MOUTH EVERY DAY    aspirin (ASPIR-LOW) 81 MG Oral Tab EC Take 1 tablet (81 mg total) by mouth daily.    Losartan Potassium 25 MG Oral Tab Take 1 tablet (25 mg total) by mouth daily.    cyclobenzaprine 5 MG Oral Tab Take 1 tablet (5 mg total) by mouth 3 (three) times daily as needed for Muscle spasms.    albuterol 108 (90 Base) MCG/ACT Inhalation Aero Soln Inhale 2 puffs into the lungs every 6 (six) hours as needed.    fluocinolone 0.01 % External Cream 1 thin application to the affected areas twice a day as needed.    triamcinolone 0.1 % External Cream Apply topically 2 (two) times daily as needed.

## 2025-05-23 NOTE — PAYOR COMM NOTE
--------------  ADMISSION REVIEW   5/22-5/23  Payor: [employer] OPEN ACCESS   Subscriber #:  [id number]  Authorization Number: [id number]    Admit date: 5/22/25  Admit time:  2:58 PM       REVIEW DOCUMENTATION:  Procedure Performed: LHC, COR, and LV  Date of Procedure: 5/22/2025   Post procedure findings:  1) Left Ventriculography at 30 degrees DAVILA: LVEF: 60%, no wall motion abnormality  2) Hemodynamics: LVEDP: 18 mmHg, no gradient across aortic valve  3) Coronaries:  Left main is patent and distal segment has 80% eccentric stenosis  LAD is patent and free of obstructive disease, supplies 2 diagonals which are non-obstructive  Cx is patent and free of obstructive disease, supplies 2 OM branches which are patent  RCA is dominant and free of obstructive disease, supplies PDA and PL        Recommendations:  Surgical consultation for severe left main disease            5/23 Operative Note   Preoperative Diagnosis: Coronary artery disease involving native coronary artery of native heart with other form of angina pectoris [I25.118]     Dyspnea on exertion  Critical left main stenosis     Postoperative Diagnosis: Coronary artery disease involving native coronary artery of native heart with other form of angina pectoris [I25.118]     Dyspnea on exertion  Critical left main stenosis  Procedures:   Median sternotomy  Urgent coronary artery bypass grafting x 2  Left internal mammary artery to left anterior descending artery  Reverse saphenous vein graft to lateral obtuse marginal artery  Endoscopic vein harvesting, greater saphenous vein, left lower extremity  Complex sternal closure with sternal plating system, sternal lock blue      Surgical Findings:   Left anterior descending artery 2 mm vessel good target  Lateral obtuse marginal artery 2 mm vessel, good target  Pain quality very good, 3-1/2 mm diameter good quality  Mammary artery very good, 2 mm diameter with excellent flow  Completion echocardiogram with  ejection fraction 50 to 55% with no wall motion abnormalities.      Anesthesia: Cardiac     Complications: None      Implants: sternal plating system, sternal lock blue\     Specimen: None     Drains: 32 Thai chest tubes x 2     Condition: Critical to CVICU     Estimated Blood Loss: 200 mm          MEDICATIONS ADMINISTERED IN LAST 1 DAY:  acetaminophen (Ofirmev) 10 mg/mL infusion premix 1,000 mg       Date Action Dose Route User    5/23/2025 1328 New Bag 1,000 mg Intravenous Hanna Butler RN          amiodarone in dextrose 4.14% (Cordarone) 360 mg/200mL infusion premix       Date Action Dose Route User    5/23/2025 1214 New Bag 1 mg/min Intravenous Ghada Dunn MD          amiodarone (Cordarone) 150 mg in dextrose 5% 100 mL IV bolus       Date Action Dose Route User    5/23/2025 1059 New Bag 150 mg Intravenous Ghada Dunn MD          ascorbic acid (Vitamin C) tab 1,000 mg       Date Action Dose Route User    5/22/2025 2017 Given 1,000 mg Oral Rolando Whitley RN          ceFAZolin (Ancef) 3 g in sodium chloride 0.9% 100mL IVPB premix       Date Action Dose Route User    5/23/2025 0746 Given 3 g Intravenous Ghada Dunn MD          ceFAZolin (Ancef) injection       Date Action Dose Route User    5/23/2025 0946 Given 1 g Extracorporeal Monico Robbins MD          chlorhexidine gluconate (Peridex) 0.12 % oral solution 15 mL       Date Action Dose Route User    5/23/2025 1328 Given 15 mL Mouth/Throat Hanna Butler RN          clevidipine (Cleviprex) 25 MG/50ML IV infusion       Date Action Dose Route User    5/23/2025 1132 Rate/Dose Change 0.1 mg/hr Intravenous Ghada Dunn MD    5/23/2025 1123 Restarted 0.2 mg/hr Intravenous Ghada Dunn MD    5/23/2025 1103 Restarted 1 mg/hr Intravenous Ghada Dunn MD    5/23/2025 0955 Rate/Dose Change 0.5 mg/hr Intravenous Ghada Dunn MD    5/23/2025 0950 New Bag 1 mg/hr Intravenous Ghada Dunn MD          dexmedeTOMIDine in sodium chloride  0.9% (Precedex) 400 mcg/100mL infusion premix       Date Action Dose Route User    5/23/2025 1130 Rate/Dose Change 0.8 mcg/kg/hr × 118.8 kg Intravenous Ghada Dunn MD    5/23/2025 0950 Rate/Dose Change 1 mcg/kg/hr × 118.8 kg Intravenous Ghada Dunn MD    5/23/2025 0757 New Bag 0.8 mcg/kg/hr × 118.8 kg Intravenous Ghada Dunn MD          dexmedeTOMIDine in sodium chloride 0.9% (Precedex) 400 mcg/100mL infusion premix       Date Action Dose Route User    5/23/2025 1245 Restarted 0.8 mcg/kg/hr × 119.5 kg (Dosing Weight) Intravenous Hanna Butler RN          dextrose 5%-sodium chloride 0.45% infusion       Date Action Dose Route User    5/23/2025 1326 New Bag 40 mL/hr Intravenous Hanna Butler RN          DOBUTamine in dextrose 5% (Dobutrex) 500 mg/250mL infusion premix       Date Action Dose Route User    5/23/2025 1131 Rate/Dose Change 2 mcg/kg/min × 118.8 kg Intravenous Ghada Dunn MD    5/23/2025 1050 Restarted 1 mcg/kg/min × 118.8 kg Intravenous Ghada Dunn MD    5/23/2025 0929 Rate/Dose Change 1 mcg/kg/min × 118.8 kg Intravenous Ghada Dunn MD    5/23/2025 0926 Rate/Dose Change 2 mcg/kg/min × 118.8 kg Intravenous Ghada Dunn MD    5/23/2025 0915 Rate/Dose Change 2.5 mcg/kg/min × 118.8 kg Intravenous Ghada Dunn MD    5/23/2025 0904 Rate/Dose Change 2 mcg/kg/min × 118.8 kg Intravenous Ghada Dunn MD    5/23/2025 0826 Rate/Dose Change 3 mcg/kg/min × 118.8 kg Intravenous Ghada Dunn MD    5/23/2025 0819 New Bag 2 mcg/kg/min × 118.8 kg Intravenous Ghada Dunn MD          DOBUTamine in dextrose 5% (Dobutrex) 500 mg/250mL infusion premix       Date Action Dose Route User    5/23/2025 1300 Rate/Dose Change 2 mcg/kg/min × 119.5 kg (Dosing Weight) Intravenous Hanna Butler, CHITO          ePHEDrine 50 MG/ML injection       Date Action Dose Route User    5/23/2025 0811 Given 5 mg Intravenous Ghada Dunn MD          famotidine (Pepcid) 20 mg/2mL  injection 20 mg       Date Action Dose Route User    5/23/2025 1328 Given 20 mg Intravenous Hanna Butler RN          fentaNYL (Sublimaze) 50 mcg/mL injection       Date Action Dose Route User    5/23/2025 0950 Given 50 mcg Intravenous Ghada Dunn MD    5/23/2025 0931 Given 50 mcg Intravenous Ghada Dunn MD    5/23/2025 0846 Given 50 mcg Intravenous Ghada Dunn MD    5/23/2025 0804 Given 50 mcg Intravenous Ghada Dunn MD    5/23/2025 0803 Given 50 mcg Intravenous Ghada Dunn MD    5/23/2025 0720 Given 100 mcg Intravenous Ghada Dunn MD          gelatin adsorbable (Gelfoam) 100 external spone       Date Action Dose Route User    5/23/2025 0815 Given 2 each Topical (Sternal Area) Monico Robbins MD          heparin (Porcine) 1000 UNIT/ML injection       Date Action Dose Route User    5/23/2025 0921 Given 48,000 Units Intravenous Ghada Dunn MD          hydrALAzine (Apresoline) 20 mg/mL injection 10 mg       Date Action Dose Route User    5/22/2025 2018 Given 10 mg Intravenous Rolando Whitley RN          insulin regular human (Novolin R, Humulin R) 100 Units in sodium chloride 0.9% 100 mL standard infusion (100 mL)       Date Action Dose Route User    5/23/2025 1007 New Bag 1 Units/hr Intravenous Ghada Dunn MD          insulin regular human (Novolin R, Humulin R) 100 Units in sodium chloride 0.9% 100 mL standard infusion (100 mL)       Date Action Dose Route User    5/23/2025 1300 Rate/Dose Change 1 Units/hr Intravenous Hanna Butler RN          lactated ringers infusion       Date Action Dose Route User    5/23/2025 0709 New Bag (none) Intravenous Ghada Dunn MD          lidocaine (Xylocaine) 1 % injection       Date Action Dose Route User    5/23/2025 0710 Given 1 mL Intradermal Ghada Dunn MD          magnesium sulfate in dextrose 5% 1 g/100mL infusion premix 1 g       Date Action Dose Route User    5/23/2025 1348 New Bag 1 g Intravenous Hanna Butler RN           melatonin tab 3 mg       Date Action Dose Route User    5/22/2025 2021 Given 3 mg Oral Rolando Whitley RN          metoclopramide (Reglan) 5 mg/mL injection 10 mg       Date Action Dose Route User    5/23/2025 1328 Given 10 mg Intravenous Hanna Butler RN          metoprolol tartrate (Lopressor) tab 25 mg       Date Action Dose Route User    5/23/2025 0618 Given 25 mg Oral Rolando Whitley RN    5/22/2025 1918 Given 25 mg Oral Heath Jesus RN          midazolam (Versed) 2 MG/2ML injection       Date Action Dose Route User    5/23/2025 0710 Given 2 mg Intravenous Ghada Dunn MD          mupirocin (Bactroban) 2% nasal ointment 1 Application       Date Action Dose Route User    5/23/2025 0618 Given 1 Application Nasal (Bilateral Nares) Rolando Whitley RN    5/22/2025 2016 Given 1 Application Nasal (Bilateral Nares) Rolando Whitley RN          norepinephrine (Levophed) 4 mg/250mL infusion premix       Date Action Dose Route User    5/23/2025 1152 Restarted 0.2 mcg/min Intravenous Ghada Dunn MD    5/23/2025 1050 Restarted 1 mcg/min Intravenous Ghada Dunn MD    5/23/2025 0941 Restarted 0.5 mcg/min Intravenous Ghada Dunn MD    5/23/2025 0832 Rate/Dose Change 0.5 mcg/min Intravenous Ghada Dunn MD    5/23/2025 0827 Restarted 1 mcg/min Intravenous Ghada Dunn MD    5/23/2025 0803 Rate/Dose Change 1 mcg/min Intravenous Ghada Dunn MD    5/23/2025 0754 New Bag 2 mcg/min Intravenous Ghada Dunn MD          norepinephrine (Levophed) 4 mg/250mL infusion premix       Date Action Dose Route User    5/23/2025 1300 Rate/Dose Change 2 mcg/min Intravenous Hanna Butler RN          aminocaproic acid (Amicar) 10 g in sodium chloride 0.9% 250 mL infusion       Date Action Dose Route User    5/23/2025 1214 Rate/Dose Change 1 g/hr Intravenous Ghada Dunn MD    5/23/2025 0961 New Bag 1 g/hr Intravenous Ghada Dunn MD          aminocaproic acid 5 g BOLUS FROM BAG infusion        Date Action Dose Route User    5/23/2025 0925 Given 5 g Intravenous Ghada Dunn MD          sodium chloride 0.9% 1,000 mL with heparin (Porcine) 10,000 Units mixture (OR nurse to mix)       Date Action Dose Route User    5/23/2025 0815 Given (none) Irrigation Monico Robbins MD          sodium chloride 0.9% 30 mL with papaverine 300 mg mixture (OR nurse to mix)       Date Action Dose Route User    5/23/2025 0815 Given (none) Topical Monico Robbins MD          phenylephrine (Bernard-Synephrine) 10 MG/ML injection       Date Action Dose Route User    5/23/2025 1111 Given 200 mcg Intravenous Ghada Dunn MD    5/23/2025 0945 Given 100 mcg Intravenous Ghada Dunn MD    5/23/2025 0944 Given 250 mcg Intravenous Ghada Dunn MD    5/23/2025 0811 Given 100 mcg Intravenous Ghada Dunn MD    5/23/2025 0754 Given 100 mcg Intravenous Ghada Dunn MD    5/23/2025 0745 Given 100 mcg Intravenous Ghada Dunn MD    5/23/2025 0737 Given 100 mcg Intravenous Ghada Dunn MD    5/23/2025 0725 Given 100 mcg Intravenous Ghada Dunn MD    5/23/2025 0722 Given 100 mcg Intravenous Ghada Dunn MD          propofol (Diprivan) 10 MG/ML injection       Date Action Dose Route User    5/23/2025 1126 Given 50 mg Intravenous Ghada Dunn MD    5/23/2025 0950 Given 50 mg Intravenous Ghada Dunn MD    5/23/2025 0804 Given 50 mg Intravenous Ghada Dunn MD    5/23/2025 0740 Given 50 mg Intravenous Ghada Dunn MD    5/23/2025 0720 Given 100 mg Intravenous Ghada Dunn MD          propofol (Diprivan) 10 mg/mL infusion premix       Date Action Dose Route User    5/23/2025 1134 Rate/Dose Change 60 mcg/kg/min × 118.8 kg Intravenous Ghada Dunn MD    5/23/2025 0929 Rate/Dose Change 70 mcg/kg/min × 118.8 kg Intravenous Ghada Dunn MD    5/23/2025 0924 Rate/Dose Change 50 mcg/kg/min × 118.8 kg Intravenous Ghada Dunn MD    5/23/2025 0844 New Bag 20 mcg/kg/min ×  118.8 kg Intravenous Ghada Dunn MD          protamine 10 mg/mL injection       Date Action Dose Route User    5/23/2025 1105 Given 480 mg Intravenous Ghada Dunn MD          rocuronium (Zemuron) 50 mg/5mL injection       Date Action Dose Route User    5/23/2025 1056 Given 20 mg Intravenous Ghada Dunn MD    5/23/2025 1040 Given 20 mg Intravenous Ghada Dunn MD    5/23/2025 1001 Given 10 mg Intravenous Ghada Dunn MD    5/23/2025 0937 Given 10 mg Intravenous Ghada Dunn MD    5/23/2025 0922 Given 20 mg Intravenous Ghada Dunn MD    5/23/2025 0843 Given 20 mg Intravenous Ghada Dunn MD    5/23/2025 0802 Given 30 mg Intravenous Ghada Dunn MD    5/23/2025 0721 Given 70 mg Intravenous Ghada Dunn MD          sodium chloride 0.9% infusion       Date Action Dose Route User    5/23/2025 1205 New Bag (none) Intravenous Ghada Dunn MD    5/23/2025 0736 New Bag (none) Intravenous Ghada Dunn MD          sodium chloride 0.9% infusion       Date Action Dose Route User    5/23/2025 1326 New Bag (none) Intravenous Hanna Butler RN          sugammadex (Bridion) 200 MG/2ML injection 240 mg       Date Action Dose Route User    5/23/2025 1337 Given 240 mg Intravenous Hanna Butler RN          vancomycin (Vancocin) 1 g injection       Date Action Dose Route User    5/23/2025 1124 Given 1 g Topical Monico Robbins MD    5/23/2025 0816 Given 1 g Topical Monico Robbins MD            Vitals (last day)       Date/Time Temp Pulse Resp BP SpO2 Weight O2 Device O2 Flow Rate (L/min) [facility]    05/23/25 1247 98 °F (36.7 °C) 87 12 116/82 97 % -- -- -- SK    05/23/25 0600 -- 93 27 -- 94 % -- -- -- MT    05/23/25 0500 -- -- -- -- -- 261 lb 14.5 oz (118.8 kg) -- -- MT    05/23/25 0400 98 °F (36.7 °C) 78 17 149/73 92 % -- None (Room air) -- MT    05/23/25 0200 -- 87 25 142/96 97 % -- None (Room air) -- MT    05/23/25 0000 98.6 °F (37 °C) 73 17 154/90 97 % -- None (Room air) -- MT     05/22/25 2200 -- 68 18 141/93 96 % -- None (Room air) -- MT    05/22/25 2000 98.2 °F (36.8 °C) 88 22 155/113 95 % -- None (Room air) -- MT    05/22/25 1900 -- 79 22 172/94 98 % -- -- --     05/22/25 1800 -- 79 18 167/96 94 % -- -- --     05/22/25 1700 -- 76 21 159/98 -- -- -- --     05/22/25 1600 -- 74 16 160/104 93 % -- -- --     05/22/25 1500 -- 83 18 153/107 92 % 263 lb 7.2 oz (119.5 kg) -- -- G    05/22/25 1445 -- 81 26 141/95 96 % -- None (Room air) -- MG    05/22/25 1430 -- 83 20 135/97 96 % -- None (Room air) -- MG    05/22/25 1145 -- 85 15 147/92 96 % -- None (Room air) -- MG    05/22/25 1130 -- 80 21 147/92 96 % -- None (Room air) -- MG    05/22/25 1100 -- 82 15 163/89 97 % -- None (Room air) -- MG    05/22/25 1045 -- 78 21 152/96 94 % -- None (Room air) -- MG    05/22/25 1030 -- 81 27 169/96 94 % -- None (Room air) -- MG    05/22/25 1015 -- 80 17 155/104 95 % -- None (Room air) -- MG    05/22/25 1000 -- 80 19 162/107 96 % -- None (Room air) -- MG    05/22/25 0816 98.1 °F (36.7 °C) 86 18 157/97 95 % 269 lb (122 kg) None (Room air) -- MG          CIWA Scores (since admission)       None

## 2025-05-23 NOTE — CONSULTS
Initial Pulmonary/ICU/Critical Care Consultation        Reason for Consultation: post CABG  Referring Physician: Dr. Robbins      HPI: 60 yo male with hx of HTN, HLP, ETOH use with shortness of breath.   Was evaluated by Dr. Marley from pulm and cardiology  Underwent cardiac w/u showed significant 80% left main stenosis but no disease in the right coronary artery and no disease in the LAD or circumflex branches.   Pt underwent CABG x 2 today.   No intra-op blood pdts  Seen post op on ventilator  On  2, small dose levo, on amicar, amio, propofol, precedex, insulin, dextrose      REVIEW OF SYSTEMS:  Positives and negatives as noted in HPI. All other review of systems otherwise are either limited (due to pt/family inability to provide) or negative.      PAST MEDICAL HISTORY:  Past Medical History[1]      PAST SURGICAL HISTORY:  Past Surgical History[2]      PAST SOCIAL HISTORY:  Social Hx on file[3]      PAST FAMILY HISTORY:  Family History[4]      ALLERGIES:  Allergies[5]      MEDS:  Home Medications:  Medications Taking[6]    Scheduled Medication:  Scheduled Medications[7]  Continuous Infusing Medication:  Medication Infusions[8]  PRN Medications:  PRN Medications[9]       PHYSICAL EXAM:  /82   Pulse 87   Temp 98 °F (36.7 °C)   Resp 12   Ht 5' 11\" (1.803 m)   Wt 261 lb 14.5 oz (118.8 kg)   SpO2 97%   BMI 36.53 kg/m²        CONSTITUTIONAL: intubated sedated  HEENT: atraumatic normocephalic  MOUTH: ETT, OGT  NECK/THROAT: no JVD. Trachea midline. No obvious thyromegaly. Right cordis with swan  LUNG: clear upper b/l no wheezing, crackles. Diminished bases. Chest symmetric with respiratory motion  HEART: regular rate and rhythm, no obvious murmers or gallops noted. Midsternal incision dressed. + chest tube  ABD: soft non tender. + bowel sounds. No organomegaly noted  EXT: no clubbing, cyanosis, or edema noted. Pulses intact grossly  NEURO/MUSCULOSKELETAL: sedated intubated  SKIN: warm, dry. No obvious  lesions noted  LYMPH: no obvious LAD      IMAGES:   Post op CXR ordered      LABS:  Recent Labs   Lab 05/22/25  1518 05/23/25  1233   RBC 4.90 4.34   HGB 15.4 13.9   HCT 43.9 39.6   MCV 89.6 91.2   MCH 31.4 32.0   MCHC 35.1 35.1   RDW 12.4 12.2   WBC 6.7 16.3*   .0 185.0       Recent Labs   Lab 05/22/25  1518   GLU 96   BUN 10   CREATSERUM 1.01   EGFRCR 86   CA 9.4   ALB 4.4      K 3.9      CO2 26.0   ALKPHO 103   AST 25   ALT 58*   BILT 1.0   TP 6.8         ASSESSMENT/PLAN:  CAD s/p CABG x 2 on 5/23/25  -post op chest tube mgmt as per CV surgery   -post op cardiac gtts as per CV  -asa, statin, plavix, bb as per cards    Post op vent support  -check CXR and ABG  -tentative plan for extubation later today     BG  -dextrose, insulin    Hx of ETOH use  -CIWA post extubation     Proph  -lovenox    Dispo  -full code    Thank you for the opportunity to care for Elias Shah.      MINNA Mccall DO, MPH  Pulmonary Critical Care Medicine  [facility]                         [1]   Past Medical History:  Diagnosis Date    ALCOHOL USE     Cervical radiculopathy     Essential hypertension     High blood pressure 3/2020    High cholesterol 2015    Hyperlipidemia     Sleep apnea    [2]   Past Surgical History:  Procedure Laterality Date    Colonoscopy  5/2015    Other surgical history  02/10/2018    tumor removal carotid    Vasectomy  2002   [3]   Social History  Socioeconomic History    Marital status:      Spouse name: Juana     Number of children: 2   Occupational History    Occupation:     Tobacco Use    Smoking status: Never     Passive exposure: Never    Smokeless tobacco: Never    Tobacco comments:     may have a cigar once or twice a year   Vaping Use    Vaping status: Never Used   Substance and Sexual Activity    Alcohol use: Yes     Alcohol/week: 20.0 standard drinks of alcohol     Types: 20 Standard drinks or equivalent per  week     Comment: Have cut back on alcohol    Drug use: No   [4]   Family History  Problem Relation Age of Onset    Cancer Father         bladder, prostate    Heart Disorder Father         MI at age 40    Cancer Mother 81        lung, smoker    Other (hypothyroidism) Mother     Cancer Sister     Other (Rheumatoid Arthritis) Sister     Hypertension Brother     Stroke Brother         age 56 (second stroke)    Heart Attack Paternal Grandfather         age 50   [5] No Known Allergies  [6]   Outpatient Medications Marked as Taking for the 5/22/25 encounter (Hospital Encounter) with [facility] RM[facility] CATH LAB   Medication Sig Dispense Refill    DICLOFENAC 50 MG Oral Tab EC TAKE 1 TABLET BY MOUTH 3 TIMES DAILY AS NEEDED. 90 tablet 0    levothyroxine 25 MCG Oral Tab Take 1 tablet (25 mcg total) by mouth before breakfast. 90 tablet 3    atorvastatin 40 MG Oral Tab TAKE 1 TABLET BY MOUTH EVERY DAY 90 tablet 3    aspirin (ASPIR-LOW) 81 MG Oral Tab EC Take 1 tablet (81 mg total) by mouth daily. 30 tablet 11    Losartan Potassium 25 MG Oral Tab Take 1 tablet (25 mg total) by mouth daily. 30 tablet 5   [7]    sugammadex  2 mg/kg Intravenous Once    acetaminophen  1,000 mg Intravenous Q8H    [START ON 5/24/2025] sennosides  8.6 mg Oral BID    [START ON 5/24/2025] docusate sodium  100 mg Oral BID    metoclopramide  10 mg Intravenous Q6H    famotidine  20 mg Oral BID    Or    famotidine  20 mg Intravenous BID    [START ON 5/24/2025] metoprolol tartrate  25 mg Oral 2x Daily(Beta Blocker)    mupirocin  1 Application Nasal BID    chlorhexidine gluconate  15 mL Mouth/Throat BID    [START ON 5/24/2025] multivitamin  1 tablet Oral Daily    [START ON 5/24/2025] ascorbic acid  500 mg Oral TID    [START ON 5/24/2025] enoxaparin  40 mg Subcutaneous Daily    [START ON 5/24/2025] clopidogrel  75 mg Oral Daily    [START ON 5/24/2025] aspirin  81 mg Oral Daily    ceFAZolin  2 g Intravenous Q8H    [Transfer Hold] atorvastatin  40 mg Oral Daily    [Held  by provider] losartan  25 mg Oral Daily    [Transfer Hold] levothyroxine  25 mcg Oral Before breakfast   [8]    sodium chloride      dexmedetomidine      DOBUTamine      nitroGLYCERIN in dextrose 5%      norepinephrine      clevidipine      dextrose 5%-sodium chloride 0.45%      insulin regular     [9]   melatonin    polyethylene glycol (PEG 3350)    bisacodyl    ondansetron    DOBUTamine    nitroGLYCERIN in dextrose 5%    norepinephrine    potassium chloride **OR** potassium chloride    magnesium sulfate in dextrose 5%    magnesium sulfate in sterile water for injection    acetaminophen **OR** HYDROcodone-acetaminophen **OR** HYDROcodone-acetaminophen    morphINE **OR** morphINE    sodium chloride    albumin human    glucose **OR** glucose **OR** glucose-vitamin C **OR** dextrose **OR** glucose **OR** glucose **OR** glucose-vitamin C

## 2025-05-23 NOTE — ANESTHESIA PROCEDURE NOTES
Central Line    Date/Time: 5/23/2025 7:29 AM    Performed by: Ghada Dunn MD  Authorized by: Ghada Dunn MD    General Information and Staff    Procedure Start:  5/23/2025 7:29 AM  Procedure End:  5/23/2025 7:36 AM  Anesthesiologist:  Ghada Dunn MD  Performed by:  Anesthesiologist  Patient Location:  OR  Indication: central venous access and CVP monitoring    Site Identification: real time ultrasound guided, surface landmarks and image stored and retrievable    Preanesthetic Checklist: 2 patient identifiers, IV checked, risks and benefits discussed, monitors and equipment checked, pre-op evaluation, timeout performed, anesthesia consent and sterile technique used    Procedure Detail    Patient Position:  Trendelenburg  Laterality:  Right  Site:  Internal jugular  Prep:  Chloraprep  Catheter Size:  9 Fr  Catheter Length (cm):  10  Catheter Type:  MAC introducer  Number of Lumens:  Double lumen  Oximetric Catheter?: Yes    Procedure Detail: target vein identified, needle advanced into vein and blood aspirated and guidewire advanced into vein    Seldinger Technique?: Yes    Intravenous Verification: verified by ultrasound and venous blood return    Post Insertion: all ports aspirated, all ports flushed easily, guidewire was removed intact, line was sutured in place and dressing was applied      Assessment    Events: patient tolerated procedure well with no complications      PA Catheter Placement    PA Catheter Placed?: Yes    PA Catheter Type:  Oximetric  PA Catheter Size:  8  Laterality:  Right  Site:  Internal jugular  Placement Confirmation: pressure tracing changes and verified by GWYN    PA Catheter Depth (cm):  50  Events: patient tolerated procedure well with no complications      Additional Comments     After cordis placement, a continuous cardiac output Erie-Nikolai catheter was brought into the sterile field. Once a CVP waveform was obtained, the balloon was then inflated and the catheter was  advanced through the right ventricle and into the pulmonary artery until a pulmonary artery tracing was obtained. The balloon was then deflated, the catheter locked into position, and covered with a sterile sheath. During the floating procedure, the pressure at the catheter tip was continuously measured via the distal port.    The PA catheter was floated easily without resistance, not wedged

## 2025-05-23 NOTE — CONSULTS
[facility]  Cardiology Consultation    Elias Shah Patient Status:  Inpatient    1965 MRN [id number]   Location [facility] 2W/SW Attending Eileen Munoz MD   Hosp Day # 1 PCP Bobby Swift MD     Date of Admission:  2025  Date of Consult:  2025  Reason for Consultation:   S/p CABG    History of Present Illness:   Patient is a 59-year-old male with a history of HTN, HLD, and JAMES who presented for elective two-vessel CABG.  Recently had outpatient coronary CT that was abnormal, subsequent coronary angiogram noted distal left main 80% stenosis.  Now is status post two-vessel CABG LIMA to LAD and vein graft to OM and doing well postoperatively.    Cardiac history:  CAD s/p CABG (LIMA-LAD, SVG-OM) on 2025  HTN  HLD  JAMES    Past Medical History  Past Medical History:    ALCOHOL USE    Cervical radiculopathy    Essential hypertension    High blood pressure    High cholesterol    Hyperlipidemia    Sleep apnea     Past Surgical History  Past Surgical History:   Procedure Laterality Date    Colonoscopy  2015    Other surgical history  02/10/2018    tumor removal carotid    Vasectomy  2002     Family History  Family History   Problem Relation Age of Onset    Cancer Father         bladder, prostate    Heart Disorder Father         MI at age 40    Cancer Mother 81        lung, smoker    Other (hypothyroidism) Mother     Cancer Sister     Other (Rheumatoid Arthritis) Sister     Hypertension Brother     Stroke Brother         age 56 (second stroke)    Heart Attack Paternal Grandfather         age 50     Social History  Pediatric History   Patient Parents    Not on file     Other Topics Concern    Not on file   Social History Narrative    Not on file         Current Medications:  Current Hospital Medications[1]  Prescriptions Prior to Admission[2]  Allergies  Allergies[3]    Review of Systems:     Unable to obtain as patient intubated and sedated.    Physical  Exam:     Patient Vitals for the past 24 hrs:   BP Temp Temp src Pulse Resp SpO2 Weight   05/23/25 1247 116/82 98 °F (36.7 °C) -- 87 12 97 % --   05/23/25 0600 -- -- -- 93 (!) 27 94 % --   05/23/25 0500 -- -- -- -- -- -- 261 lb 14.5 oz (118.8 kg)   05/23/25 0400 149/73 98 °F (36.7 °C) Temporal 78 17 92 % --   05/23/25 0200 (!) 142/96 -- -- 87 25 97 % --   05/23/25 0000 154/90 98.6 °F (37 °C) Temporal 73 17 97 % --   05/22/25 2200 (!) 141/93 -- -- 68 18 96 % --   05/22/25 2000 (!) 155/113 98.2 °F (36.8 °C) Temporal 88 22 95 % --   05/22/25 1900 (!) 172/94 -- -- 79 22 98 % --   05/22/25 1800 (!) 167/96 -- -- 79 18 94 % --   05/22/25 1700 (!) 159/98 -- -- 76 21 -- --   05/22/25 1600 (!) 160/104 -- -- 74 16 93 % --   05/22/25 1500 (!) 153/107 -- -- 83 18 92 % 263 lb 7.2 oz (119.5 kg)   05/22/25 1445 (!) 141/95 -- -- 81 26 96 % --   05/22/25 1430 (!) 135/97 -- -- 83 20 96 % --     Intake/Output:   Last 3 shifts:   Intake/Output                   05/21/25 0700 - 05/22/25 0659 (Not Admitted) 05/22/25 0700 - 05/23/25 0659 05/23/25 0700 - 05/24/25 0659       Intake    I.V.  --  --  1600    Volume (mL) (sodium chloride 0.9% infusion) -- -- 1000    Volume (mL) (lactated ringers infusion) -- -- 600    Blood  --  --  500    Cell Saver Blood  -- -- 500    Other  --  --  700    Other -- -- 700    IV PIGGYBACK  --  --  125    Volume (mL) (aminocaproic acid 5 g BOLUS FROM BAG infusion) -- -- 125    Total Intake -- -- 2925       Output    Urine  --  --  395    Urine -- -- 350    Output (mL) (Urethral Catheter Latex;Double-lumen) -- -- 45    Blood  --  --  1500    Blood Output -- -- 1500    Chest Tube  --  --  5    Output (mL) (Y Chest Tube 1 and 2 1 Anterior Mediastinal 32 Fr. 2 Anterior Pleural 32 Fr.) -- -- 5    Total Output -- -- 1900       Net I/O     -- -- 1025          Vent Settings:    Hemodynamic parameters (last 24 hours):    Scheduled Meds: Scheduled Medications[4]  Continuous Infusions: Medication Infusions[5]    General:  Intubated, sedated.  HEENT: Normocephalic, anicteric sclera, neck supple, no thyromegaly or adenopathy.  Neck: No JVD, carotids 2+, no bruits.  Cardiac: Regular rate and rhythm. S1, S2 normal. No murmur, pericardial rub, S3, or extra cardiac sounds.  Lungs: Clear without wheezes, rales, rhonchi or dullness.  Normal excursions and effort.  Abdomen: Soft, non-tender. No organosplenomegally, mass or rebound, BS-present.  Extremities: Without clubbing or cyanosis. No edema.    Neurologic: Sedated.  Skin: Warm and dry.     Results:   Laboratory Data:  Lab Results   Component Value Date    WBC 16.3 (H) 05/23/2025    HGB 13.9 05/23/2025    HCT 39.6 05/23/2025    .0 05/23/2025    CREATSERUM 0.94 05/23/2025    BUN 11 05/23/2025     05/23/2025    K 4.3 05/23/2025     05/23/2025    CO2 23.0 05/23/2025     (H) 05/23/2025    CA 8.0 (L) 05/23/2025    ALB 4.4 05/22/2025    ALKPHO 103 05/22/2025    TP 6.8 05/22/2025    AST 25 05/22/2025    ALT 58 (H) 05/22/2025    PTT 25.8 05/23/2025    INR 1.25 (H) 05/23/2025    PTP 16.4 (H) 05/23/2025    T4F 1.1 08/02/2024    TSH 5.867 (H) 08/02/2024    PSA 1.3 12/19/2018    CRP <0.29 06/27/2023    MG 1.7 05/23/2025         Recent Labs   Lab 05/22/25  1518 05/23/25  1233   GLU 96 146*   BUN 10 11   CREATSERUM 1.01 0.94   CA 9.4 8.0*    139   K 3.9 4.3    109   CO2 26.0 23.0     Recent Labs   Lab 05/22/25  1518 05/23/25  1233   RBC 4.90 4.34   HGB 15.4 13.9   HCT 43.9 39.6   MCV 89.6 91.2   MCH 31.4 32.0   MCHC 35.1 35.1   RDW 12.4 12.2   WBC 6.7 16.3*   .0 185.0       No results for input(s): \"BNPML\" in the last 168 hours.    No results for input(s): \"TROP\", \"CK\" in the last 168 hours.    Imaging:  No results found.    Impression:   Assessment:  CAD s/p CABG (LIMA-LAD, SVG-OM) on 5/23/2025  HTN  HLD  JAMES    Plan:  - Wean IV dobutamine as tolerated  - Extubation plans per pulmonary  - Start aspirin 81 mg daily, Plavix 75 mg daily, atorvastatin 40 mg  daily, metoprolol 25 mg twice daily once extubated  - Currently on IV amiodarone per protocol, can discontinue at discharge if no postop A-fib  - Routine postoperative care per CV surgery    Thank you for allowing me to participate in the care of your patient.    Nawaf Basurto MD  [facility]  5/23/2025         [1]   Current Facility-Administered Medications   Medication Dose Route Frequency    sugammadex (Bridion) 200 MG/2ML injection 240 mg  2 mg/kg Intravenous Once    sodium chloride 0.9% infusion   Intravenous Continuous    acetaminophen (Ofirmev) 10 mg/mL infusion premix 1,000 mg  1,000 mg Intravenous Q8H    melatonin tab 3 mg  3 mg Oral Nightly PRN    [START ON 5/24/2025] sennosides (Senokot) tab 8.6 mg  8.6 mg Oral BID    [START ON 5/24/2025] docusate sodium (Colace) cap 100 mg  100 mg Oral BID    polyethylene glycol (PEG 3350) (Miralax) 17 g oral packet 17 g  17 g Oral Daily PRN    bisacodyl (Dulcolax) 10 MG rectal suppository 10 mg  10 mg Rectal Daily PRN    metoclopramide (Reglan) 5 mg/mL injection 10 mg  10 mg Intravenous Q6H    ondansetron (Zofran) 4 MG/2ML injection 4 mg  4 mg Intravenous Q6H PRN    famotidine (Pepcid) tab 20 mg  20 mg Oral BID    Or    famotidine (Pepcid) 20 mg/2mL injection 20 mg  20 mg Intravenous BID    dexmedeTOMIDine in sodium chloride 0.9% (Precedex) 400 mcg/100mL infusion premix  0.2-1.5 mcg/kg/hr (Dosing Weight) Intravenous Continuous    DOBUTamine in dextrose 5% (Dobutrex) 500 mg/250mL infusion premix  2.5-20 mcg/kg/min (Dosing Weight) Intravenous Continuous PRN    nitroGLYCERIN in dextrose 5% 50 mg/250mL infusion premix  5-300 mcg/min Intravenous Continuous PRN    norepinephrine (Levophed) 4 mg/250mL infusion premix  0.5-30 mcg/min Intravenous Continuous PRN    [START ON 5/24/2025] metoprolol tartrate (Lopressor) tab 25 mg  25 mg Oral 2x Daily(Beta Blocker)    clevidipine (Cleviprex) 25 MG/50ML IV infusion  1-6 mg/hr Intravenous Continuous    potassium  chloride 20 mEq/100mL IVPB premix 20 mEq  20 mEq Intravenous PRN    Or    potassium chloride 40 mEq/100mL IVPB premix (central line) 40 mEq  40 mEq Intravenous PRN    magnesium sulfate in dextrose 5% 1 g/100mL infusion premix 1 g  1 g Intravenous PRN    magnesium sulfate in sterile water for injection 2 g/50mL IVPB premix 2 g  2 g Intravenous PRN    mupirocin (Bactroban) 2% nasal ointment 1 Application  1 Application Nasal BID    chlorhexidine gluconate (Peridex) 0.12 % oral solution 15 mL  15 mL Mouth/Throat BID    [START ON 5/24/2025] multivitamin (Tab-A-Gris/Beta Carotene) tab 1 tablet  1 tablet Oral Daily    [START ON 5/24/2025] ascorbic acid (Vitamin C) tab 500 mg  500 mg Oral TID    dextrose 5%-sodium chloride 0.45% infusion   mL/hr Intravenous Continuous    [START ON 5/24/2025] enoxaparin (Lovenox) 40 MG/0.4ML SUBQ injection 40 mg  40 mg Subcutaneous Daily    acetaminophen (Tylenol) tab 650 mg  650 mg Oral Q4H PRN    Or    HYDROcodone-acetaminophen (Norco) 5-325 MG per tab 1 tablet  1 tablet Oral Q4H PRN    Or    HYDROcodone-acetaminophen (Norco) 5-325 MG per tab 2 tablet  2 tablet Oral Q4H PRN    morphINE PF 4 MG/ML injection 2 mg  2 mg Intravenous Q2H PRN    Or    morphINE PF 4 MG/ML injection 4 mg  4 mg Intravenous Q2H PRN    sodium chloride 0.9 % IV bolus 250 mL  250 mL Intravenous PRN    albumin human (Albumin) 5% injection 12.5 g  12.5 g Intravenous Once PRN    [START ON 5/24/2025] clopidogrel (Plavix) tab 75 mg  75 mg Oral Daily    [START ON 5/24/2025] aspirin DR tab 81 mg  81 mg Oral Daily    glucose (Dex4) 15 GM/59ML oral liquid 15 g  15 g Oral Q15 Min PRN    Or    glucose (Glutose) 40% oral gel 15 g  15 g Oral Q15 Min PRN    Or    glucose-vitamin C (Dex-4) chewable tab 4 tablet  4 tablet Oral Q15 Min PRN    Or    dextrose 50% injection 50 mL  50 mL Intravenous Q15 Min PRN    Or    glucose (Dex4) 15 GM/59ML oral liquid 30 g  30 g Oral Q15 Min PRN    Or    glucose (Glutose) 40% oral gel 30 g  30  g Oral Q15 Min PRN    Or    glucose-vitamin C (Dex-4) chewable tab 8 tablet  8 tablet Oral Q15 Min PRN    insulin regular human (Novolin R, Humulin R) 100 Units in sodium chloride 0.9% 100 mL standard infusion (100 mL)  1-28 Units/hr Intravenous Continuous    ceFAZolin (Ancef) 2g in 10mL IV syringe premix  2 g Intravenous Q8H    [Transfer Hold] atorvastatin (Lipitor) tab 40 mg  40 mg Oral Daily    [Held by provider] losartan (Cozaar) tab 25 mg  25 mg Oral Daily    [Transfer Hold] levothyroxine (Synthroid) tab 25 mcg  25 mcg Oral Before breakfast   [2]   Medications Prior to Admission   Medication Sig    DICLOFENAC 50 MG Oral Tab EC TAKE 1 TABLET BY MOUTH 3 TIMES DAILY AS NEEDED.    levothyroxine 25 MCG Oral Tab Take 1 tablet (25 mcg total) by mouth before breakfast.    atorvastatin 40 MG Oral Tab TAKE 1 TABLET BY MOUTH EVERY DAY    aspirin (ASPIR-LOW) 81 MG Oral Tab EC Take 1 tablet (81 mg total) by mouth daily.    Losartan Potassium 25 MG Oral Tab Take 1 tablet (25 mg total) by mouth daily.    cyclobenzaprine 5 MG Oral Tab Take 1 tablet (5 mg total) by mouth 3 (three) times daily as needed for Muscle spasms.    albuterol 108 (90 Base) MCG/ACT Inhalation Aero Soln Inhale 2 puffs into the lungs every 6 (six) hours as needed.    fluocinolone 0.01 % External Cream 1 thin application to the affected areas twice a day as needed.    triamcinolone 0.1 % External Cream Apply topically 2 (two) times daily as needed.   [3] No Known Allergies  [4]    sugammadex  2 mg/kg Intravenous Once    acetaminophen  1,000 mg Intravenous Q8H    [START ON 5/24/2025] sennosides  8.6 mg Oral BID    [START ON 5/24/2025] docusate sodium  100 mg Oral BID    metoclopramide  10 mg Intravenous Q6H    famotidine  20 mg Oral BID    Or    famotidine  20 mg Intravenous BID    [START ON 5/24/2025] metoprolol tartrate  25 mg Oral 2x Daily(Beta Blocker)    mupirocin  1 Application Nasal BID    chlorhexidine gluconate  15 mL Mouth/Throat BID    [START ON  5/24/2025] multivitamin  1 tablet Oral Daily    [START ON 5/24/2025] ascorbic acid  500 mg Oral TID    [START ON 5/24/2025] enoxaparin  40 mg Subcutaneous Daily    [START ON 5/24/2025] clopidogrel  75 mg Oral Daily    [START ON 5/24/2025] aspirin  81 mg Oral Daily    ceFAZolin  2 g Intravenous Q8H    [Transfer Hold] atorvastatin  40 mg Oral Daily    [Held by provider] losartan  25 mg Oral Daily    [Transfer Hold] levothyroxine  25 mcg Oral Before breakfast   [5]    sodium chloride      dexmedetomidine 0.8 mcg/kg/hr (05/23/25 1245)    DOBUTamine 2 mcg/kg/min (05/23/25 1300)    nitroGLYCERIN in dextrose 5%      norepinephrine Stopped (05/23/25 1323)    clevidipine      dextrose 5%-sodium chloride 0.45%      insulin regular

## 2025-05-24 ENCOUNTER — APPOINTMENT (OUTPATIENT)
Dept: GENERAL RADIOLOGY | Facility: HOSPITAL | Age: 60
End: 2025-05-24
Attending: CLINICAL NURSE SPECIALIST
Payer: COMMERCIAL

## 2025-05-24 LAB
ANION GAP SERPL CALC-SCNC: 7 MMOL/L (ref 0–18)
ATRIAL RATE: 78 BPM
BASOPHILS # BLD AUTO: 0.03 X10(3) UL (ref 0–0.2)
BASOPHILS NFR BLD AUTO: 0.3 %
BLOOD TYPE BARCODE: 5100
BUN BLD-MCNC: 10 MG/DL (ref 9–23)
BUN/CREAT SERPL: 10.1 (ref 10–20)
CALCIUM BLD-MCNC: 7.8 MG/DL (ref 8.7–10.4)
CHLORIDE SERPL-SCNC: 109 MMOL/L (ref 98–112)
CO2 SERPL-SCNC: 24 MMOL/L (ref 21–32)
CREAT BLD-MCNC: 0.99 MG/DL (ref 0.7–1.3)
DEPRECATED RDW RBC AUTO: 40.9 FL (ref 35.1–46.3)
EGFRCR SERPLBLD CKD-EPI 2021: 88 ML/MIN/1.73M2 (ref 60–?)
EOSINOPHIL # BLD AUTO: 0.03 X10(3) UL (ref 0–0.7)
EOSINOPHIL NFR BLD AUTO: 0.3 %
ERYTHROCYTE [DISTWIDTH] IN BLOOD BY AUTOMATED COUNT: 12.5 % (ref 11–15)
GLUCOSE BLD-MCNC: 127 MG/DL (ref 70–99)
GLUCOSE BLDC GLUCOMTR-MCNC: 112 MG/DL (ref 70–99)
GLUCOSE BLDC GLUCOMTR-MCNC: 115 MG/DL (ref 70–99)
GLUCOSE BLDC GLUCOMTR-MCNC: 116 MG/DL (ref 70–99)
GLUCOSE BLDC GLUCOMTR-MCNC: 119 MG/DL (ref 70–99)
GLUCOSE BLDC GLUCOMTR-MCNC: 121 MG/DL (ref 70–99)
GLUCOSE BLDC GLUCOMTR-MCNC: 123 MG/DL (ref 70–99)
GLUCOSE BLDC GLUCOMTR-MCNC: 126 MG/DL (ref 70–99)
GLUCOSE BLDC GLUCOMTR-MCNC: 126 MG/DL (ref 70–99)
GLUCOSE BLDC GLUCOMTR-MCNC: 130 MG/DL (ref 70–99)
GLUCOSE BLDC GLUCOMTR-MCNC: 130 MG/DL (ref 70–99)
GLUCOSE BLDC GLUCOMTR-MCNC: 131 MG/DL (ref 70–99)
GLUCOSE BLDC GLUCOMTR-MCNC: 137 MG/DL (ref 70–99)
GLUCOSE BLDC GLUCOMTR-MCNC: 139 MG/DL (ref 70–99)
GLUCOSE BLDC GLUCOMTR-MCNC: 98 MG/DL (ref 70–99)
HCT VFR BLD AUTO: 31.8 % (ref 39–53)
HGB BLD-MCNC: 11.3 G/DL (ref 13–17.5)
IMM GRANULOCYTES # BLD AUTO: 0.04 X10(3) UL (ref 0–1)
IMM GRANULOCYTES NFR BLD: 0.4 %
LYMPHOCYTES # BLD AUTO: 1.16 X10(3) UL (ref 1–4)
LYMPHOCYTES NFR BLD AUTO: 11.7 %
MAGNESIUM SERPL-MCNC: 2.2 MG/DL (ref 1.6–2.6)
MCH RBC QN AUTO: 31.7 PG (ref 26–34)
MCHC RBC AUTO-ENTMCNC: 35.5 G/DL (ref 31–37)
MCV RBC AUTO: 89.3 FL (ref 80–100)
MONOCYTES # BLD AUTO: 1.28 X10(3) UL (ref 0.1–1)
MONOCYTES NFR BLD AUTO: 12.9 %
NEUTROPHILS # BLD AUTO: 7.38 X10 (3) UL (ref 1.5–7.7)
NEUTROPHILS # BLD AUTO: 7.38 X10(3) UL (ref 1.5–7.7)
NEUTROPHILS NFR BLD AUTO: 74.4 %
OSMOLALITY SERPL CALC.SUM OF ELEC: 291 MOSM/KG (ref 275–295)
P AXIS: 7 DEGREES
P-R INTERVAL: 202 MS
PLATELET # BLD AUTO: 174 10(3)UL (ref 150–450)
POTASSIUM SERPL-SCNC: 3.6 MMOL/L (ref 3.5–5.1)
Q-T INTERVAL: 360 MS
QRS DURATION: 80 MS
QTC CALCULATION (BEZET): 410 MS
R AXIS: 34 DEGREES
RBC # BLD AUTO: 3.56 X10(6)UL (ref 4.3–5.7)
SODIUM SERPL-SCNC: 140 MMOL/L (ref 136–145)
T AXIS: 45 DEGREES
UNIT VOLUME: 350 ML
VENTRICULAR RATE: 78 BPM
WBC # BLD AUTO: 9.9 X10(3) UL (ref 4–11)

## 2025-05-24 PROCEDURE — 71045 X-RAY EXAM CHEST 1 VIEW: CPT | Performed by: CLINICAL NURSE SPECIALIST

## 2025-05-24 PROCEDURE — 99233 SBSQ HOSP IP/OBS HIGH 50: CPT | Performed by: HOSPITALIST

## 2025-05-24 PROCEDURE — 99233 SBSQ HOSP IP/OBS HIGH 50: CPT | Performed by: INTERNAL MEDICINE

## 2025-05-24 RX ORDER — ATORVASTATIN CALCIUM 40 MG/1
40 TABLET, FILM COATED ORAL DAILY
Status: DISCONTINUED | OUTPATIENT
Start: 2025-05-24 | End: 2025-05-27

## 2025-05-24 RX ORDER — LEVOTHYROXINE SODIUM 25 UG/1
25 TABLET ORAL
Status: DISCONTINUED | OUTPATIENT
Start: 2025-05-24 | End: 2025-05-27

## 2025-05-24 RX ORDER — FUROSEMIDE 10 MG/ML
20 INJECTION INTRAMUSCULAR; INTRAVENOUS
Status: DISCONTINUED | OUTPATIENT
Start: 2025-05-24 | End: 2025-05-27

## 2025-05-24 RX ADMIN — METOCLOPRAMIDE HYDROCHLORIDE 10 MG: 5 INJECTION INTRAMUSCULAR; INTRAVENOUS at 14:38:00

## 2025-05-24 RX ADMIN — FAMOTIDINE 20 MG: 20 TABLET, FILM COATED ORAL at 20:49:00

## 2025-05-24 RX ADMIN — AMIODARONE HYDROCHLORIDE 200 MG: 200 TABLET ORAL at 19:00:00

## 2025-05-24 RX ADMIN — CHLORHEXIDINE GLUCONATE ORAL RINSE 15 ML: 1.2 SOLUTION DENTAL at 20:50:00

## 2025-05-24 RX ADMIN — POTASSIUM CHLORIDE 20 MEQ: 14.9 INJECTION INTRAVENOUS at 04:41:00

## 2025-05-24 RX ADMIN — METOPROLOL TARTRATE 25 MG: 25 TABLET, FILM COATED ORAL at 09:17:00

## 2025-05-24 RX ADMIN — MORPHINE SULFATE 4 MG: 4 INJECTION, SOLUTION INTRAMUSCULAR; INTRAVENOUS at 02:02:00

## 2025-05-24 RX ADMIN — ASCORBIC ACID 500 MG: 500 MG TABLET ORAL at 16:43:00

## 2025-05-24 RX ADMIN — DOXEPIN HYDROCHLORIDE 1 TABLET: 50 CAPSULE ORAL at 14:34:00

## 2025-05-24 RX ADMIN — METOPROLOL TARTRATE 25 MG: 25 TABLET, FILM COATED ORAL at 19:00:00

## 2025-05-24 RX ADMIN — HYDROCODONE BITARTRATE AND ACETAMINOPHEN 2 TABLET: 5; 325 TABLET ORAL at 14:37:00

## 2025-05-24 RX ADMIN — ASCORBIC ACID 500 MG: 500 MG TABLET ORAL at 20:49:00

## 2025-05-24 RX ADMIN — ASPIRIN 81 MG: 81 TABLET ORAL at 09:05:00

## 2025-05-24 RX ADMIN — ENOXAPARIN SODIUM 40 MG: 100 INJECTION SUBCUTANEOUS at 09:13:00

## 2025-05-24 RX ADMIN — HYDROCODONE BITARTRATE AND ACETAMINOPHEN 2 TABLET: 5; 325 TABLET ORAL at 06:46:00

## 2025-05-24 RX ADMIN — ACETAMINOPHEN 1000 MG: 10 INJECTION, SOLUTION INTRAVENOUS at 04:10:00

## 2025-05-24 RX ADMIN — HYDROCODONE BITARTRATE AND ACETAMINOPHEN 2 TABLET: 5; 325 TABLET ORAL at 10:42:00

## 2025-05-24 RX ADMIN — FUROSEMIDE 20 MG: 10 INJECTION INTRAMUSCULAR; INTRAVENOUS at 14:34:00

## 2025-05-24 RX ADMIN — METOCLOPRAMIDE HYDROCHLORIDE 10 MG: 5 INJECTION INTRAMUSCULAR; INTRAVENOUS at 06:45:00

## 2025-05-24 RX ADMIN — SENNOSIDES 8.6 MG: 8.6 MG TABLET ORAL at 14:35:00

## 2025-05-24 RX ADMIN — AMIODARONE HYDROCHLORIDE 200 MG: 200 TABLET ORAL at 09:05:00

## 2025-05-24 RX ADMIN — HYDROCODONE BITARTRATE AND ACETAMINOPHEN 2 TABLET: 5; 325 TABLET ORAL at 18:59:00

## 2025-05-24 RX ADMIN — DOCUSATE SODIUM 100 MG: 100 CAPSULE, LIQUID FILLED ORAL at 20:49:00

## 2025-05-24 RX ADMIN — CLOPIDOGREL BISULFATE 75 MG: 75 TABLET ORAL at 09:05:00

## 2025-05-24 RX ADMIN — CHLORHEXIDINE GLUCONATE ORAL RINSE 15 ML: 1.2 SOLUTION DENTAL at 09:03:00

## 2025-05-24 RX ADMIN — FAMOTIDINE 20 MG: 10 INJECTION, SOLUTION INTRAVENOUS at 09:10:00

## 2025-05-24 RX ADMIN — METOCLOPRAMIDE HYDROCHLORIDE 10 MG: 5 INJECTION INTRAMUSCULAR; INTRAVENOUS at 00:58:00

## 2025-05-24 RX ADMIN — DOCUSATE SODIUM 100 MG: 100 CAPSULE, LIQUID FILLED ORAL at 09:00:00

## 2025-05-24 RX ADMIN — METOCLOPRAMIDE HYDROCHLORIDE 10 MG: 5 INJECTION INTRAMUSCULAR; INTRAVENOUS at 20:50:00

## 2025-05-24 RX ADMIN — DEXTROSE MONOHYDRATE AND SODIUM CHLORIDE 40 ML/HR: 5; .45 INJECTION, SOLUTION INTRAVENOUS at 09:24:00

## 2025-05-24 RX ADMIN — ATORVASTATIN CALCIUM 40 MG: 40 TABLET, FILM COATED ORAL at 14:34:00

## 2025-05-24 RX ADMIN — SENNOSIDES 8.6 MG: 8.6 MG TABLET ORAL at 20:49:00

## 2025-05-24 RX ADMIN — LEVOTHYROXINE SODIUM 25 MCG: 25 TABLET ORAL at 09:03:00

## 2025-05-24 NOTE — PROGRESS NOTES
Cardiology Progress Note    Elias Shah Patient Status:  Inpatient    1965 MRN [id number]   Location [facility] 2W/SW Attending Eileen Munoz MD   Hosp Day # 2 PCP Bobby Swift MD       Subjective: Feeling better, postop day 1 with urgent LIMA to LAD and SVG to OM with severe distal left main disease.  Preserved ejection fraction    Objective:   Temp: 99.3 °F (37.4 °C)  Pulse: 83  Resp: 27  BP: 123/80  AO: 122/77  FiO2 (%): 50 %    Intake/Output:     Intake/Output Summary (Last 24 hours) at 2025 0756  Last data filed at 2025 0700  Gross per 24 hour   Intake 5552.96 ml   Output 3145 ml   Net 2407.96 ml       Last 3 Weights   25 0600 269 lb 6.4 oz (122.2 kg)   25 0500 261 lb 14.5 oz (118.8 kg)   25 1500 263 lb 7.2 oz (119.5 kg)   25 0816 269 lb (122 kg)   25 0916 272 lb (123.4 kg)   25 1016 275 lb (124.7 kg)   25 1601 269 lb (122 kg)       Tele: Ventricular arrhythmia noted    Physical Exam:     General: Alert and oriented x 3. No apparent distress. No respiratory or constitutional distress.  HEENT: Normocephalic, anicteric sclera, neck supple.  Neck: No JVD, carotids 2+, no bruits.  Cardiac: Regular rate and rhythm. S1, S2 normal. No murmur, pericardial rub, S3.  Lungs: Clear without wheezes, rales, rhonchi or dullness.  Normal excursions and effort.  Abdomen: Soft, non-tender. BS-present.  Extremities: Without clubbing, cyanosis or edema.  Peripheral pulses are 2+.  Neurologic: Alert and oriented, normal affect.  Skin: Warm and dry.     Laboratory/Data:    Labs:    Lab Results   Component Value Date    TSH 3.082 2025       Recent Labs   Lab 25  1518 25  1233 25  0351   WBC 6.7 16.3* 9.9   HGB 15.4 13.9 11.3*   MCV 89.6 91.2 89.3   .0 185.0 174.0   INR 0.97 1.25*  --        Recent Labs   Lab 25  1518 25  1233 25  2148 25  0351    139  --  140   K 3.9 4.3 3.7 3.6   CL  104 109  --  109   CO2 26.0 23.0  --  24.0   BUN 10 11  --  10   CREATSERUM 1.01 0.94  --  0.99   CA 9.4 8.0*  --  7.8*   MG  --  1.7 1.8 2.2   GLU 96 146*  --  127*       Recent Labs   Lab 05/22/25  1518   ALT 58*   AST 25   ALB 4.4       No results for input(s): \"TROP\" in the last 168 hours.    Allergies:   Allergies[1]    Medications:  Current Hospital Medications[2]      Assessment:  Postop day #1 for CABG x 2 with LIMA to LAD and SVG to OM  Normal LVEF  Ventricular arrhythmia postop    Plan:  Continue amiodarone protocol  Start metoprolol once Levophed is weaned      Cesar Marion MD  5/24/2025  7:56 AM    3       [1] No Known Allergies  [2]   Current Facility-Administered Medications   Medication Dose Route Frequency    sodium chloride 0.9% infusion   Intravenous Continuous    melatonin tab 3 mg  3 mg Oral Nightly PRN    sennosides (Senokot) tab 8.6 mg  8.6 mg Oral BID    docusate sodium (Colace) cap 100 mg  100 mg Oral BID    polyethylene glycol (PEG 3350) (Miralax) 17 g oral packet 17 g  17 g Oral Daily PRN    bisacodyl (Dulcolax) 10 MG rectal suppository 10 mg  10 mg Rectal Daily PRN    metoclopramide (Reglan) 5 mg/mL injection 10 mg  10 mg Intravenous Q6H    ondansetron (Zofran) 4 MG/2ML injection 4 mg  4 mg Intravenous Q6H PRN    famotidine (Pepcid) tab 20 mg  20 mg Oral BID    Or    famotidine (Pepcid) 20 mg/2mL injection 20 mg  20 mg Intravenous BID    dexmedeTOMIDine in sodium chloride 0.9% (Precedex) 400 mcg/100mL infusion premix  0.2-1.5 mcg/kg/hr (Dosing Weight) Intravenous Continuous    DOBUTamine in dextrose 5% (Dobutrex) 500 mg/250mL infusion premix  2.5-20 mcg/kg/min (Dosing Weight) Intravenous Continuous PRN    nitroGLYCERIN in dextrose 5% 50 mg/250mL infusion premix  5-300 mcg/min Intravenous Continuous PRN    norepinephrine (Levophed) 4 mg/250mL infusion premix  0.5-30 mcg/min Intravenous Continuous PRN    metoprolol tartrate (Lopressor) tab 25 mg  25 mg Oral 2x Daily(Beta Blocker)     clevidipine (Cleviprex) 25 MG/50ML IV infusion  1-6 mg/hr Intravenous Continuous    potassium chloride 20 mEq/100mL IVPB premix 20 mEq  20 mEq Intravenous PRN    Or    potassium chloride 40 mEq/100mL IVPB premix (central line) 40 mEq  40 mEq Intravenous PRN    magnesium sulfate in dextrose 5% 1 g/100mL infusion premix 1 g  1 g Intravenous PRN    magnesium sulfate in sterile water for injection 2 g/50mL IVPB premix 2 g  2 g Intravenous PRN    mupirocin (Bactroban) 2% nasal ointment 1 Application  1 Application Nasal BID    chlorhexidine gluconate (Peridex) 0.12 % oral solution 15 mL  15 mL Mouth/Throat BID    multivitamin (Tab-A-Gris/Beta Carotene) tab 1 tablet  1 tablet Oral Daily    ascorbic acid (Vitamin C) tab 500 mg  500 mg Oral TID    dextrose 5%-sodium chloride 0.45% infusion   mL/hr Intravenous Continuous    enoxaparin (Lovenox) 40 MG/0.4ML SUBQ injection 40 mg  40 mg Subcutaneous Daily    acetaminophen (Tylenol) tab 650 mg  650 mg Oral Q4H PRN    Or    HYDROcodone-acetaminophen (Norco) 5-325 MG per tab 1 tablet  1 tablet Oral Q4H PRN    Or    HYDROcodone-acetaminophen (Norco) 5-325 MG per tab 2 tablet  2 tablet Oral Q4H PRN    morphINE PF 4 MG/ML injection 2 mg  2 mg Intravenous Q2H PRN    Or    morphINE PF 4 MG/ML injection 4 mg  4 mg Intravenous Q2H PRN    sodium chloride 0.9 % IV bolus 250 mL  250 mL Intravenous PRN    albumin human (Albumin) 5% injection 12.5 g  12.5 g Intravenous Once PRN    clopidogrel (Plavix) tab 75 mg  75 mg Oral Daily    aspirin DR tab 81 mg  81 mg Oral Daily    glucose (Dex4) 15 GM/59ML oral liquid 15 g  15 g Oral Q15 Min PRN    Or    glucose (Glutose) 40% oral gel 15 g  15 g Oral Q15 Min PRN    Or    glucose-vitamin C (Dex-4) chewable tab 4 tablet  4 tablet Oral Q15 Min PRN    Or    dextrose 50% injection 50 mL  50 mL Intravenous Q15 Min PRN    Or    glucose (Dex4) 15 GM/59ML oral liquid 30 g  30 g Oral Q15 Min PRN    Or    glucose (Glutose) 40% oral gel 30 g  30 g Oral Q15  Min PRN    Or    glucose-vitamin C (Dex-4) chewable tab 8 tablet  8 tablet Oral Q15 Min PRN    insulin regular human (Novolin R, Humulin R) 100 Units in sodium chloride 0.9% 100 mL standard infusion (100 mL)  1-28 Units/hr Intravenous Continuous    ceFAZolin (Ancef) 2g in 10mL IV syringe premix  2 g Intravenous Q8H    amiodarone in dextrose 4.14% (Cordarone) 360 mg/200mL infusion premix  0.5 mg/min Intravenous Continuous    amiodarone (Pacerone) tab 200 mg  200 mg Oral BID with meals    [Transfer Hold] atorvastatin (Lipitor) tab 40 mg  40 mg Oral Daily    [Held by provider] losartan (Cozaar) tab 25 mg  25 mg Oral Daily    [Transfer Hold] levothyroxine (Synthroid) tab 25 mcg  25 mcg Oral Before breakfast

## 2025-05-24 NOTE — PLAN OF CARE
Problem: Patient Centered Care  Goal: Patient preferences are identified and integrated in the patient's plan of care  Description: Interventions:  - What would you like us to know as we care for you?   - Provide timely, complete, and accurate information to patient/family  - Incorporate patient and family knowledge, values, beliefs, and cultural backgrounds into the planning and delivery of care  - Encourage patient/family to participate in care and decision-making at the level they choose  - Honor patient and family perspectives and choices  Outcome: Progressing     Problem: Patient/Family Goals  Goal: Patient/Family Long Term Goal  Description: Patient's Long Term Goal: to be healthy    Interventions:  -   - See additional Care Plan goals for specific interventions  Outcome: Progressing  Goal: Patient/Family Short Term Goal  Description: Patient's Short Term Goal: to get ct out    Interventions:   -   - See additional Care Plan goals for specific interventions  Outcome: Progressing     Problem: CARDIOVASCULAR - ADULT  Goal: Maintains optimal cardiac output and hemodynamic stability  Description: INTERVENTIONS:  - Monitor vital signs, rhythm, and trends  - Monitor for bleeding, hypotension and signs of decreased cardiac output  - Evaluate effectiveness of vasoactive medications to optimize hemodynamic stability  - Monitor arterial and/or venous puncture sites for bleeding and/or hematoma  - Assess quality of pulses, skin color and temperature  - Assess for signs of decreased coronary artery perfusion - ex. Angina  - Evaluate fluid balance, assess for edema, trend weights  Outcome: Progressing  Goal: Absence of cardiac arrhythmias or at baseline  Description: INTERVENTIONS:  - Continuous cardiac monitoring, monitor vital signs, obtain 12 lead EKG if indicated  - Evaluate effectiveness of antiarrhythmic and heart rate control medications as ordered  - Initiate emergency measures for life threatening arrhythmias  -  Monitor electrolytes and administer replacement therapy as ordered  Outcome: Progressing     Problem: SAFETY ADULT - FALL  Goal: Free from fall injury  Description: INTERVENTIONS:  - Assess pt frequently for physical needs  - Identify cognitive and physical deficits and behaviors that affect risk of falls.  - [place] fall precautions as indicated by assessment.  - Educate pt/family on patient safety including physical limitations  - Instruct pt to call for assistance with activity based on assessment  - Modify environment to reduce risk of injury  - Provide assistive devices as appropriate  - Consider OT/PT consult to assist with strengthening/mobility  - Encourage toileting schedule  Outcome: Progressing     Problem: PAIN - ADULT  Goal: Verbalizes/displays adequate comfort level or patient's stated pain goal  Description: INTERVENTIONS:  - Encourage pt to monitor pain and request assistance  - Assess pain using appropriate pain scale  - Administer analgesics based on type and severity of pain and evaluate response  - Implement non-pharmacological measures as appropriate and evaluate response  - Consider cultural and social influences on pain and pain management  - Manage/alleviate anxiety  - Utilize distraction and/or relaxation techniques  - Monitor for opioid side effects  - Notify MD/LIP if interventions unsuccessful or patient reports new pain  - Anticipate increased pain with activity and pre-medicate as appropriate  Outcome: Progressing     Problem: RESPIRATORY - ADULT  Goal: Achieves optimal ventilation and oxygenation  Description: INTERVENTIONS:  - Assess for changes in respiratory status  - Assess for changes in mentation and behavior  - Position to facilitate oxygenation and minimize respiratory effort  - Oxygen supplementation based on oxygen saturation or ABGs  - Provide Smoking Cessation handout, if applicable  - Encourage broncho-pulmonary hygiene including cough, deep breathe, Incentive  Spirometry  - Assess the need for suctioning and perform as needed  - Assess and instruct to report SOB or any respiratory difficulty  - Respiratory Therapy support as indicated  - Manage/alleviate anxiety  - Monitor for signs/symptoms of CO2 retention  Outcome: Progressing   Pt A&O x3, Lungs clear, on 5lnc overnite, db&c strong, no sputum,, ct drained minimal amts, vss on sm amts dobut and levo overnite, dobut weaned off, 1 alb given in nite for low bp and filling pressures, 12 bt run vt, k and mag replaced, swan pa and cvp waveforms cont to be dampened as they were from day shift. Pt rested/dozed thru out nite, ms 4mg ivp x2, oob to chair in am, with 2 assist, did well, taking h20 w/o difficulty, norco 2 for pain.

## 2025-05-24 NOTE — CM/SW NOTE
05/24/25 1700   CM/SW Referral Data   Referral Source Physician   Reason for Referral Discharge planning   Informant Patient;Spouse/Significant Other   Medical Hx   Does patient have an established PCP? Yes  (Bobby sanchez)   Patient Info   Patient's Current Mental Status at Time of Assessment Alert;Oriented   Patient's Home Environment House   Number of Levels in Home 2   Number of Stair in Home Will stay on 1st floor   Patient lives with Spouse/Significant other   Patient Status Prior to Admission   Independent with ADLs and Mobility Yes   Services in place prior to admission DME/Supplies at home   Type of DME/Supplies Straight Cane;Standard Walker   Discharge Needs   Anticipated D/C needs Home health care     Pt discussed during nursing rounds. Sx CABG x 2 w/Dr Robbins, now on RA and off all drips. Home w/spouse, independent and working FT prior to admission. MDO HH at NH. HH referrals sent in AIDIN, F2F entered. List of accepting agencies will be needed for choice. Pt has [employer] insurance. PT/OT jesusita orderedf for tomorrow.    5/25/2025 at 1745: [facility] is only accepting  agency at NH. Agency reserved in [facility]IDIN. PT/OT evals remains pending.    Plan: Home w/spouse with [facility] pending evals and medical clearance.    / to remain available for support and/or discharge planning.     BENITA Gray    [phone]

## 2025-05-24 NOTE — PROGRESS NOTES
[facility]  part of [facility]    Progress Note      Assessment and Plan:   1.  Status post coronary artery bypass grafting-postoperative day #1.  Doing wonderfully.  Chest x-ray is unimpressive.  Extubated without event.    Recommendations:  1.  Aggressive antipain, antithrombosis and antiatelectasis measures  2.  As per CV surgery and cardiology  3.  Out of bed to chair  4.  Will follow clinically.  5.  Aspirin amiodarone clopidogrel furosemide metoprolol    2.  DVT prophylaxis-Lovenox    3.  History of alcohol use    4.  CODE STATUS-full      Subjective:   Elias Shah is a(n) 59 year old male who is breathing well without new complaint    Objective:   Blood pressure 112/72, pulse 87, temperature 99 °F (37.2 °C), temperature source Temporal, resp. rate 23, height 5' 11\" (1.803 m), weight 269 lb 6.4 oz (122.2 kg), SpO2 91%.    Physical Exam alert white male  HEENT examination is unremarkable with pupils equal round and reactive to light and accommodation.   Neck without adenopathy, thyromegaly, JVD nor bruit.   Lungs clear to auscultation and percussion.  Cardiac regular rate and rhythm no murmur.   Abdomen nontender, without hepatosplenomegaly and no mass appreciable.   Extremities without clubbing cyanosis nor edema.   Neurologic grossly intact with symmetric tone and strength and reflex.  Skin without gross abnormality     Results:     Lab Results   Component Value Date    WBC 9.9 05/24/2025    HGB 11.3 05/24/2025    HCT 31.8 05/24/2025    .0 05/24/2025    CREATSERUM 0.99 05/24/2025    BUN 10 05/24/2025     05/24/2025    K 3.6 05/24/2025     05/24/2025    CO2 24.0 05/24/2025     05/24/2025    CA 7.8 05/24/2025    MG 2.2 05/24/2025     Chest x-ray-mild left basilar haziness    Selvin Goldberg MD  Medical Director, Critical Care, [facility]  Medical Director, [facility]  Pager: [phone]

## 2025-05-24 NOTE — PROGRESS NOTES
[facility]  part of [facility]    Report of Consultation    Elias Shah Patient Status:  Inpatient    1965 MRN [id number]   Location [facility] 2W/SW Attending Don Alejandra MD   Hosp Day # 2 PCP Bobby Swift MD     Date of Admission:  2025    Subjective:   POD 1 s/p CABG x2. No acute events overnight. Some vent arrhythmia post op. On amio. Dobs levo off this AM. Pain well controlled, OOBTC, using IS, tolerating diet.       Physical Exam:   Blood pressure 117/75, pulse 84, temperature 98.9 °F (37.2 °C), temperature source Pulmonary Artery, resp. rate (!) 28, height 5' 11\" (1.803 m), weight 269 lb 6.4 oz (122.2 kg), SpO2 94%.  Intake/Output:   Last 3 shifts: I/O last 3 completed shifts:  In: 5553 [P.O.:120; I.V.:3138; Blood:500; Other:700; IV PIGGYBACK:1095]  Out: 3115 [Urine:1140; Blood:1500; Chest Tube:475]   This shift: I/O this shift:  In: -   Out: 245 [Urine:165; Chest Tube:80]     Vent Settings: Vent Mode: CPAP;PS  FiO2 (%):  [50 %-60 %] 50 %  S RR:  [12] 12  S VT:  [550 mL] 550 mL  PEEP/CPAP (cm H2O):  [5 cm H20] 5 cm H20  MAP (cm H2O):  [7.2-9] 7.2    Hemodynamic parameters (last 24 hours): PAP: (10-40)/(-2-27) 40/25  CO:  [4 L/min-9.6 L/min] 7.1 L/min  CI:  [1.7 L/min/m2-4.1 L/min/m2] 3 L/min/m2    Scheduled Meds: Scheduled Medications[1]    Continuous Infusions: Medication Infusions[2]    Patient is awake alert and in no distress.  The head is normocephalic.  Pupils are equally round and reactive.  The sclera are nonicteric.  Hearing appears adequate in both ears.  Neck is soft supple with adequate range of motion no appreciable adenopathy mass or JVD.  Carotids are 2+ without bruits.  Lungs are clear to auscultation and percussion.  Cardiac exam there is a normal rate and rhythm, incision CDI, CT serosang  Abdomen is soft nontender with no organomegaly mass or appreciable aneurysm.  Extremities are without cyanosis clubbing or edema.  Pulses are  equal and intact in all extremities.  Skin is warm and dry without obvious pathological lesions ulcerations or wounds, leg soft, dry dressing   Neurologic exam is grossly normal with motor and sensory intact and no obvious cranial nerve abnormalities.    Results:     Laboratory Data:  Lab Results   Component Value Date    WBC 9.9 05/24/2025    HGB 11.3 (L) 05/24/2025    HCT 31.8 (L) 05/24/2025    .0 05/24/2025    CREATSERUM 0.99 05/24/2025    BUN 10 05/24/2025     05/24/2025    K 3.6 05/24/2025     05/24/2025    CO2 24.0 05/24/2025     (H) 05/24/2025    CA 7.8 (L) 05/24/2025    ALB 4.4 05/22/2025    ALKPHO 103 05/22/2025    TP 6.8 05/22/2025    AST 25 05/22/2025    ALT 58 (H) 05/22/2025    PTT 25.8 05/23/2025    INR 1.25 (H) 05/23/2025    PTP 16.4 (H) 05/23/2025    T4F 1.1 08/02/2024    TSH 3.082 05/23/2025    PSA 1.3 12/19/2018    CRP <0.29 06/27/2023    MG 2.2 05/24/2025         Imaging:  XR CHEST AP PORTABLE  (CPT=71045)  Result Date: 5/23/2025  PROCEDURE: XR CHEST AP PORTABLE  (CPT=71045) TIME: 13 12.   COMPARISON: [facility], XR CHEST PA + LAT CHEST (CPT=71046), 5/22/2025, 12:26 PM.  INDICATIONS: [place], ET tube, and OG tube placement.  TECHNIQUE:   Single view.   FINDINGS/IMPRESSION:    1. There is an endotracheal tube with tip approximately 4 cm above the alessandra.  There is a right IJ [place]-Nikolai catheter with tip overlying the expected position of the distal right ventricle.  There are mediastinal drains.  2. The heart mediastinal structures are enlarged.  Pulmonary vascularity is slightly increased.  There are trace bilateral pleural effusions.  The findings are suggestive of slight fluid overload.  3. Lung volumes are diminished.  There are small streaky opacities at the lung bases likely representing bibasilar atelectasis.    Dictated by (CST): Emigdio Giang MD on 5/23/2025 at 1:39 PM     Finalized by (CST): Emigdio Giang MD on 5/23/2025 at 1:41 PM          CT  CHEST (CPT=71250)  Result Date: 5/22/2025  CONCLUSION:   Mild calcific atherosclerosis thoracic aorta without evidence of aneurysm.    Dictated by (CST): Julian Gaines MD on 5/22/2025 at 1:14 PM     Finalized by (CST): Julian Gaines MD on 5/22/2025 at 1:19 PM          US CAROTID DOPPLER BILAT - DIAG IMG (CPT=93880)  Result Date: 5/22/2025  CONCLUSION:  1. Mild atherosclerosis of the carotid bifurcations without hemodynamically stenosis or occlusion.  2. Antegrade flow is documented in the vertebral arteries bilaterally.   3. Lesser incidental findings as above.    Dictated by (CST): Uzair Prescott MD on 5/22/2025 at 12:43 PM     Finalized by (CST): Uzair Prescott MD on 5/22/2025 at 12:46 PM               Assessment and Plan:    POD 1 s/p CABG x2, doing well. Dobs and Levo off overnight. Cont amio per protocol.  Start BB. Start Lasix IV 20 BID. Pull swan and A line. Leave CT and wires. Encourage OOBTC, IS, ambulation. All questions answered.     Thank you for allowing me to participate in the care of your patient.    Gregg Marc MD, FACS  Cardiothoracic Surgeon  [facility]  [phone]          *This note was dictated, in part, using a computerized recognition system and may contain unintended errors.         [1]    levothyroxine  25 mcg Oral Daily @ 0700    sennosides  8.6 mg Oral BID    docusate sodium  100 mg Oral BID    metoclopramide  10 mg Intravenous Q6H    famotidine  20 mg Oral BID    Or    famotidine  20 mg Intravenous BID    metoprolol tartrate  25 mg Oral 2x Daily(Beta Blocker)    mupirocin  1 Application Nasal BID    chlorhexidine gluconate  15 mL Mouth/Throat BID    multivitamin  1 tablet Oral Daily    ascorbic acid  500 mg Oral TID    enoxaparin  40 mg Subcutaneous Daily    clopidogrel  75 mg Oral Daily    aspirin  81 mg Oral Daily    ceFAZolin  2 g Intravenous Q8H    amiodarone  200 mg Oral BID with meals    [Transfer Hold] atorvastatin  40 mg Oral Daily     [Held by provider] losartan  25 mg Oral Daily   [2]    sodium chloride 10 mL/hr at 05/23/25 1326    dexmedetomidine Stopped (05/23/25 1700)    DOBUTamine Stopped (05/24/25 0300)    nitroGLYCERIN in dextrose 5%      norepinephrine 2 mcg/min (05/24/25 0700)    clevidipine      dextrose 5%-sodium chloride 0.45% 40 mL/hr (05/24/25 0924)    insulin regular 1.5 Units/hr (05/24/25 0700)    amiodarone Stopped (05/24/25 0700)

## 2025-05-24 NOTE — PROGRESS NOTES
[facility]  part of [facility]    Progress Note    Elias Shah Patient Status:  Inpatient    1965 MRN [id number]   Location [facility] 2W/SW Attending Don Alejandra MD   Hosp Day # 2 PCP Bobby Swift MD       Subjective:   Elias Shah is a(n) 59 year old male was seen and examined  No acute events overnight  Resting in chair tonight  No cp, sob, f,c,n,v, abd pain or HA    Objective:   Blood pressure 102/71, pulse 70, temperature 98.9 °F (37.2 °C), temperature source Pulmonary Artery, resp. rate 17, height 5' 11\" (1.803 m), weight 269 lb 6.4 oz (122.2 kg), SpO2 96%.    Gen: AAOx4, resting comfortably  Pulm: Lungs clear, normal respiratory effort, CT in place   CV: Heart with regular rate and rhythm  Abd: Abdomen soft, nontender, nondistended, bowel sounds present  Neuro: No acute focal deficits  MSK: moves extremities  Skin: Warm and dry  Psych: calm affect  Ext: no cyanosis    Current Inpatient Medications:   Current Hospital Medications[1]    Assessment and Plan:   Acute CAD  - s/p CABG x2  - extubated successfully, doing well  - wound care and chest tube care per CT surgery  - plan asa, plavix, statin and beta blocker     HTN  - stable, cont current meds and adjust as needed     HL  - statin     Hx of etoh abuse  - monitor ciwa     Obesity  - BMI 37  - diet and exercise when stable    Full code  DVT Ppx: lovenox  MDM: High   Results:     Recent Labs   Lab 25  1518 25  1233 25  0351   RBC 4.90 4.34 3.56*   HGB 15.4 13.9 11.3*   HCT 43.9 39.6 31.8*   MCV 89.6 91.2 89.3   MCH 31.4 32.0 31.7   MCHC 35.1 35.1 35.5   RDW 12.4 12.2 12.5   NEPRELIM  --   --  7.38   WBC 6.7 16.3* 9.9   .0 185.0 174.0         Recent Labs   Lab 25  1518 25  1233 25  2148 25  0351   GLU 96 146*  --  127*   BUN 10 11  --  10   CREATSERUM 1.01 0.94  --  0.99   EGFRCR 86 93  --  88   CA 9.4 8.0*  --  7.8*    139  --  140   K 3.9  4.3 3.7 3.6    109  --  109   CO2 26.0 23.0  --  24.0         Imaging:   XR CHEST AP PORTABLE  (CPT=71045)  Result Date: 5/23/2025  PROCEDURE: XR CHEST AP PORTABLE  (CPT=71045) TIME: 13 12.   COMPARISON: [facility], XR CHEST PA + LAT CHEST (CPT=71046), 5/22/2025, 12:26 PM.  INDICATIONS: [place], ET tube, and OG tube placement.  TECHNIQUE:   Single view.   FINDINGS/IMPRESSION:    1. There is an endotracheal tube with tip approximately 4 cm above the alessandra.  There is a right IJ [place]-Nikolai catheter with tip overlying the expected position of the distal right ventricle.  There are mediastinal drains.  2. The heart mediastinal structures are enlarged.  Pulmonary vascularity is slightly increased.  There are trace bilateral pleural effusions.  The findings are suggestive of slight fluid overload.  3. Lung volumes are diminished.  There are small streaky opacities at the lung bases likely representing bibasilar atelectasis.    Dictated by (CST): Emigdio Giang MD on 5/23/2025 at 1:39 PM     Finalized by (CST): Emigdio Giang MD on 5/23/2025 at 1:41 PM          CT CHEST (CPT=71250)  Result Date: 5/22/2025  CONCLUSION:   Mild calcific atherosclerosis thoracic aorta without evidence of aneurysm.    Dictated by (CST): Julian Gaines MD on 5/22/2025 at 1:14 PM     Finalized by (CST): Julian Gaines MD on 5/22/2025 at 1:19 PM          US CAROTID DOPPLER BILAT - DIAG IMG (CPT=93880)  Result Date: 5/22/2025  CONCLUSION:  1. Mild atherosclerosis of the carotid bifurcations without hemodynamically stenosis or occlusion.  2. Antegrade flow is documented in the vertebral arteries bilaterally.   3. Lesser incidental findings as above.    Dictated by (CST): Uzair Prescott MD on 5/22/2025 at 12:43 PM     Finalized by (CST): Uzair Prescott MD on 5/22/2025 at 12:46 PM          EKG 12 Lead  Result Date: 5/24/2025  Normal sinus rhythm Nonspecific T wave abnormality Abnormal ECG When compared with ECG of  23-MAY-2025 12:51, Right bundle branch block is no longer Present Confirmed by SAAD ORTEGA PRATIK ([id number]) on 5/24/2025 10:44:19 AM    EKG 12 Lead  Result Date: 5/23/2025  Normal sinus rhythm Right bundle branch block Abnormal ECG No previous ECGs found in Muse Confirmed by RILEY XAVIER ([id number]) on 5/23/2025 4:52:20 PM        Don Alejandra MD  5/24/2025          [1]   Current Facility-Administered Medications:     levothyroxine (Synthroid) tab 25 mcg, 25 mcg, Oral, Daily @ 0700    atorvastatin (Lipitor) tab 40 mg, 40 mg, Oral, Daily    insulin aspart (NovoLOG) 100 Units/mL FlexPen 1-5 Units, 1-5 Units, Subcutaneous, TID CC    furosemide (Lasix) 10 mg/mL injection 20 mg, 20 mg, Intravenous, BID (Diuretic)    sodium chloride 0.9% infusion, , Intravenous, Continuous    melatonin tab 3 mg, 3 mg, Oral, Nightly PRN    sennosides (Senokot) tab 8.6 mg, 8.6 mg, Oral, BID    docusate sodium (Colace) cap 100 mg, 100 mg, Oral, BID    polyethylene glycol (PEG 3350) (Miralax) 17 g oral packet 17 g, 17 g, Oral, Daily PRN    bisacodyl (Dulcolax) 10 MG rectal suppository 10 mg, 10 mg, Rectal, Daily PRN    metoclopramide (Reglan) 5 mg/mL injection 10 mg, 10 mg, Intravenous, Q6H    ondansetron (Zofran) 4 MG/2ML injection 4 mg, 4 mg, Intravenous, Q6H PRN    famotidine (Pepcid) tab 20 mg, 20 mg, Oral, BID **OR** famotidine (Pepcid) 20 mg/2mL injection 20 mg, 20 mg, Intravenous, BID    dexmedeTOMIDine in sodium chloride 0.9% (Precedex) 400 mcg/100mL infusion premix, 0.2-1.5 mcg/kg/hr (Dosing Weight), Intravenous, Continuous    DOBUTamine in dextrose 5% (Dobutrex) 500 mg/250mL infusion premix, 2.5-20 mcg/kg/min (Dosing Weight), Intravenous, Continuous PRN    nitroGLYCERIN in dextrose 5% 50 mg/250mL infusion premix, 5-300 mcg/min, Intravenous, Continuous PRN    norepinephrine (Levophed) 4 mg/250mL infusion premix, 0.5-30 mcg/min, Intravenous, Continuous PRN    metoprolol tartrate (Lopressor) tab 25 mg, 25 mg, Oral, 2x Daily(Beta  Blocker)    clevidipine (Cleviprex) 25 MG/50ML IV infusion, 1-6 mg/hr, Intravenous, Continuous    potassium chloride 20 mEq/100mL IVPB premix 20 mEq, 20 mEq, Intravenous, PRN **OR** potassium chloride 40 mEq/100mL IVPB premix (central line) 40 mEq, 40 mEq, Intravenous, PRN    magnesium sulfate in dextrose 5% 1 g/100mL infusion premix 1 g, 1 g, Intravenous, PRN    magnesium sulfate in sterile water for injection 2 g/50mL IVPB premix 2 g, 2 g, Intravenous, PRN    mupirocin (Bactroban) 2% nasal ointment 1 Application, 1 Application, Nasal, BID    chlorhexidine gluconate (Peridex) 0.12 % oral solution 15 mL, 15 mL, Mouth/Throat, BID    multivitamin (Tab-A-Gris/Beta Carotene) tab 1 tablet, 1 tablet, Oral, Daily    ascorbic acid (Vitamin C) tab 500 mg, 500 mg, Oral, TID    enoxaparin (Lovenox) 40 MG/0.4ML SUBQ injection 40 mg, 40 mg, Subcutaneous, Daily    acetaminophen (Tylenol) tab 650 mg, 650 mg, Oral, Q4H PRN **OR** HYDROcodone-acetaminophen (Norco) 5-325 MG per tab 1 tablet, 1 tablet, Oral, Q4H PRN **OR** HYDROcodone-acetaminophen (Norco) 5-325 MG per tab 2 tablet, 2 tablet, Oral, Q4H PRN    morphINE PF 4 MG/ML injection 2 mg, 2 mg, Intravenous, Q2H PRN **OR** morphINE PF 4 MG/ML injection 4 mg, 4 mg, Intravenous, Q2H PRN    sodium chloride 0.9 % IV bolus 250 mL, 250 mL, Intravenous, PRN    albumin human (Albumin) 5% injection 12.5 g, 12.5 g, Intravenous, Once PRN    clopidogrel (Plavix) tab 75 mg, 75 mg, Oral, Daily    aspirin DR tab 81 mg, 81 mg, Oral, Daily    glucose (Dex4) 15 GM/59ML oral liquid 15 g, 15 g, Oral, Q15 Min PRN **OR** glucose (Glutose) 40% oral gel 15 g, 15 g, Oral, Q15 Min PRN **OR** glucose-vitamin C (Dex-4) chewable tab 4 tablet, 4 tablet, Oral, Q15 Min PRN **OR** dextrose 50% injection 50 mL, 50 mL, Intravenous, Q15 Min PRN **OR** glucose (Dex4) 15 GM/59ML oral liquid 30 g, 30 g, Oral, Q15 Min PRN **OR** glucose (Glutose) 40% oral gel 30 g, 30 g, Oral, Q15 Min PRN **OR** glucose-vitamin C  (Dex-4) chewable tab 8 tablet, 8 tablet, Oral, Q15 Min PRN    ceFAZolin (Ancef) 2g in 10mL IV syringe premix, 2 g, Intravenous, Q8H    [] amiodarone in dextrose 4.14% (Cordarone) 360 mg/200mL infusion premix, 1 mg/min, Intravenous, Continuous **FOLLOWED BY** amiodarone in dextrose 4.14% (Cordarone) 360 mg/200mL infusion premix, 0.5 mg/min, Intravenous, Continuous    amiodarone (Pacerone) tab 200 mg, 200 mg, Oral, BID with meals    [Held by provider] losartan (Cozaar) tab 25 mg, 25 mg, Oral, Daily

## 2025-05-25 LAB
ANION GAP SERPL CALC-SCNC: 8 MMOL/L (ref 0–18)
BUN BLD-MCNC: 10 MG/DL (ref 9–23)
BUN/CREAT SERPL: 10.5 (ref 10–20)
CALCIUM BLD-MCNC: 8.5 MG/DL (ref 8.7–10.4)
CHLORIDE SERPL-SCNC: 103 MMOL/L (ref 98–112)
CO2 SERPL-SCNC: 26 MMOL/L (ref 21–32)
CREAT BLD-MCNC: 0.95 MG/DL (ref 0.7–1.3)
DEPRECATED RDW RBC AUTO: 43 FL (ref 35.1–46.3)
EGFRCR SERPLBLD CKD-EPI 2021: 92 ML/MIN/1.73M2 (ref 60–?)
ERYTHROCYTE [DISTWIDTH] IN BLOOD BY AUTOMATED COUNT: 12.7 % (ref 11–15)
ERYTHROCYTE [SEDIMENTATION RATE] IN BLOOD: 19 MM/HR (ref 0–20)
GLUCOSE BLD-MCNC: 106 MG/DL (ref 70–99)
GLUCOSE BLDC GLUCOMTR-MCNC: 108 MG/DL (ref 70–99)
HCT VFR BLD AUTO: 32.4 % (ref 39–53)
HGB BLD-MCNC: 10.9 G/DL (ref 13–17.5)
MCH RBC QN AUTO: 31.2 PG (ref 26–34)
MCHC RBC AUTO-ENTMCNC: 33.6 G/DL (ref 31–37)
MCV RBC AUTO: 92.8 FL (ref 80–100)
OSMOLALITY SERPL CALC.SUM OF ELEC: 283 MOSM/KG (ref 275–295)
PLATELET # BLD AUTO: 151 10(3)UL (ref 150–450)
POTASSIUM SERPL-SCNC: 3.6 MMOL/L (ref 3.5–5.1)
RBC # BLD AUTO: 3.49 X10(6)UL (ref 4.3–5.7)
SODIUM SERPL-SCNC: 137 MMOL/L (ref 136–145)
WBC # BLD AUTO: 12.9 X10(3) UL (ref 4–11)

## 2025-05-25 PROCEDURE — 99233 SBSQ HOSP IP/OBS HIGH 50: CPT | Performed by: HOSPITALIST

## 2025-05-25 PROCEDURE — 99232 SBSQ HOSP IP/OBS MODERATE 35: CPT | Performed by: INTERNAL MEDICINE

## 2025-05-25 RX ADMIN — METOPROLOL TARTRATE 25 MG: 25 TABLET, FILM COATED ORAL at 06:53:00

## 2025-05-25 RX ADMIN — METOPROLOL TARTRATE 25 MG: 25 TABLET, FILM COATED ORAL at 16:44:00

## 2025-05-25 RX ADMIN — HYDROCODONE BITARTRATE AND ACETAMINOPHEN 1 TABLET: 5; 325 TABLET ORAL at 16:44:00

## 2025-05-25 RX ADMIN — AMIODARONE HYDROCHLORIDE 200 MG: 200 TABLET ORAL at 16:44:00

## 2025-05-25 RX ADMIN — CLOPIDOGREL BISULFATE 75 MG: 75 TABLET ORAL at 08:54:00

## 2025-05-25 RX ADMIN — SENNOSIDES 8.6 MG: 8.6 MG TABLET ORAL at 08:54:00

## 2025-05-25 RX ADMIN — FUROSEMIDE 20 MG: 10 INJECTION INTRAMUSCULAR; INTRAVENOUS at 08:55:00

## 2025-05-25 RX ADMIN — HYDROCODONE BITARTRATE AND ACETAMINOPHEN 2 TABLET: 5; 325 TABLET ORAL at 00:48:00

## 2025-05-25 RX ADMIN — SENNOSIDES 8.6 MG: 8.6 MG TABLET ORAL at 21:26:00

## 2025-05-25 RX ADMIN — ATORVASTATIN CALCIUM 40 MG: 40 TABLET, FILM COATED ORAL at 08:54:00

## 2025-05-25 RX ADMIN — METOCLOPRAMIDE HYDROCHLORIDE 10 MG: 5 INJECTION INTRAMUSCULAR; INTRAVENOUS at 14:45:00

## 2025-05-25 RX ADMIN — HYDROCODONE BITARTRATE AND ACETAMINOPHEN 1 TABLET: 5; 325 TABLET ORAL at 21:26:00

## 2025-05-25 RX ADMIN — DOCUSATE SODIUM 100 MG: 100 CAPSULE, LIQUID FILLED ORAL at 08:55:00

## 2025-05-25 RX ADMIN — LEVOTHYROXINE SODIUM 25 MCG: 25 TABLET ORAL at 06:53:00

## 2025-05-25 RX ADMIN — CHLORHEXIDINE GLUCONATE ORAL RINSE 15 ML: 1.2 SOLUTION DENTAL at 08:55:00

## 2025-05-25 RX ADMIN — POTASSIUM CHLORIDE 20 MEQ: 14.9 INJECTION INTRAVENOUS at 11:24:00

## 2025-05-25 RX ADMIN — FAMOTIDINE 20 MG: 20 TABLET, FILM COATED ORAL at 08:55:00

## 2025-05-25 RX ADMIN — ASCORBIC ACID 500 MG: 500 MG TABLET ORAL at 21:25:00

## 2025-05-25 RX ADMIN — ASCORBIC ACID 500 MG: 500 MG TABLET ORAL at 16:44:00

## 2025-05-25 RX ADMIN — HYDROCODONE BITARTRATE AND ACETAMINOPHEN 2 TABLET: 5; 325 TABLET ORAL at 08:54:00

## 2025-05-25 RX ADMIN — DOXEPIN HYDROCHLORIDE 1 TABLET: 50 CAPSULE ORAL at 08:54:00

## 2025-05-25 RX ADMIN — METOCLOPRAMIDE HYDROCHLORIDE 10 MG: 5 INJECTION INTRAMUSCULAR; INTRAVENOUS at 18:04:00

## 2025-05-25 RX ADMIN — METOCLOPRAMIDE HYDROCHLORIDE 10 MG: 5 INJECTION INTRAMUSCULAR; INTRAVENOUS at 06:54:00

## 2025-05-25 RX ADMIN — FUROSEMIDE 20 MG: 10 INJECTION INTRAMUSCULAR; INTRAVENOUS at 16:44:00

## 2025-05-25 RX ADMIN — ASPIRIN 81 MG: 81 TABLET ORAL at 08:54:00

## 2025-05-25 RX ADMIN — DOCUSATE SODIUM 100 MG: 100 CAPSULE, LIQUID FILLED ORAL at 21:00:00

## 2025-05-25 RX ADMIN — AMIODARONE HYDROCHLORIDE 200 MG: 200 TABLET ORAL at 08:53:00

## 2025-05-25 RX ADMIN — ASCORBIC ACID 500 MG: 500 MG TABLET ORAL at 08:55:00

## 2025-05-25 RX ADMIN — CHLORHEXIDINE GLUCONATE ORAL RINSE 15 ML: 1.2 SOLUTION DENTAL at 21:26:00

## 2025-05-25 RX ADMIN — FAMOTIDINE 20 MG: 20 TABLET, FILM COATED ORAL at 21:25:00

## 2025-05-25 RX ADMIN — ENOXAPARIN SODIUM 40 MG: 100 INJECTION SUBCUTANEOUS at 08:55:00

## 2025-05-25 RX ADMIN — METOCLOPRAMIDE HYDROCHLORIDE 10 MG: 5 INJECTION INTRAMUSCULAR; INTRAVENOUS at 00:40:00

## 2025-05-25 NOTE — PLAN OF CARE
No acute changes overnight. 2LNC overnight while sleeping, otherwise on RA.  PRN norco for pain.         Problem: CARDIOVASCULAR - ADULT  Goal: Maintains optimal cardiac output and hemodynamic stability  Description: INTERVENTIONS:  - Monitor vital signs, rhythm, and trends  - Monitor for bleeding, hypotension and signs of decreased cardiac output  - Evaluate effectiveness of vasoactive medications to optimize hemodynamic stability  - Monitor arterial and/or venous puncture sites for bleeding and/or hematoma  - Assess quality of pulses, skin color and temperature  - Assess for signs of decreased coronary artery perfusion - ex. Angina  - Evaluate fluid balance, assess for edema, trend weights  Outcome: Progressing  Goal: Absence of cardiac arrhythmias or at baseline  Description: INTERVENTIONS:  - Continuous cardiac monitoring, monitor vital signs, obtain 12 lead EKG if indicated  - Evaluate effectiveness of antiarrhythmic and heart rate control medications as ordered  - Initiate emergency measures for life threatening arrhythmias  - Monitor electrolytes and administer replacement therapy as ordered  Outcome: Progressing     Problem: RESPIRATORY - ADULT  Goal: Achieves optimal ventilation and oxygenation  Description: INTERVENTIONS:  - Assess for changes in respiratory status  - Assess for changes in mentation and behavior  - Position to facilitate oxygenation and minimize respiratory effort  - Oxygen supplementation based on oxygen saturation or ABGs  - Provide Smoking Cessation handout, if applicable  - Encourage broncho-pulmonary hygiene including cough, deep breathe, Incentive Spirometry  - Assess the need for suctioning and perform as needed  - Assess and instruct to report SOB or any respiratory difficulty  - Respiratory Therapy support as indicated  - Manage/alleviate anxiety  - Monitor for signs/symptoms of CO2 retention  Outcome: Progressing     Problem: SAFETY ADULT - FALL  Goal: Free from fall  injury  Description: INTERVENTIONS:  - Assess pt frequently for physical needs  - Identify cognitive and physical deficits and behaviors that affect risk of falls.  - [place] fall precautions as indicated by assessment.  - Educate pt/family on patient safety including physical limitations  - Instruct pt to call for assistance with activity based on assessment  - Modify environment to reduce risk of injury  - Provide assistive devices as appropriate  - Consider OT/PT consult to assist with strengthening/mobility  - Encourage toileting schedule  Outcome: Progressing     Problem: PAIN - ADULT  Goal: Verbalizes/displays adequate comfort level or patient's stated pain goal  Description: INTERVENTIONS:  - Encourage pt to monitor pain and request assistance  - Assess pain using appropriate pain scale  - Administer analgesics based on type and severity of pain and evaluate response  - Implement non-pharmacological measures as appropriate and evaluate response  - Consider cultural and social influences on pain and pain management  - Manage/alleviate anxiety  - Utilize distraction and/or relaxation techniques  - Monitor for opioid side effects  - Notify MD/LIP if interventions unsuccessful or patient reports new pain  - Anticipate increased pain with activity and pre-medicate as appropriate  Outcome: Progressing

## 2025-05-25 NOTE — DIETARY NOTE
Dietitian Note:     Met with patient and spouse this afternoon. Provided Eating Heart Healthy handout and reviewed. Spent time answering questions and providing meal options/ideas.     Patient and spouse verbalized understanding.     Name and number of RD provided.     Anita Everett RDN, LDN, CDE   Clinical Nutrition  Ext [phone]

## 2025-05-25 NOTE — PLAN OF CARE
Problem: CARDIOVASCULAR - ADULT  Goal: Maintains optimal cardiac output and hemodynamic stability  Description: INTERVENTIONS:  - Monitor vital signs, rhythm, and trends  - Monitor for bleeding, hypotension and signs of decreased cardiac output  - Evaluate effectiveness of vasoactive medications to optimize hemodynamic stability  - Monitor arterial and/or venous puncture sites for bleeding and/or hematoma  - Assess quality of pulses, skin color and temperature  - Assess for signs of decreased coronary artery perfusion - ex. Angina  - Evaluate fluid balance, assess for edema, trend weights  Outcome: Progressing  Goal: Absence of cardiac arrhythmias or at baseline  Description: INTERVENTIONS:  - Continuous cardiac monitoring, monitor vital signs, obtain 12 lead EKG if indicated  - Evaluate effectiveness of antiarrhythmic and heart rate control medications as ordered  - Initiate emergency measures for life threatening arrhythmias  - Monitor electrolytes and administer replacement therapy as ordered  Outcome: Progressing     Problem: RESPIRATORY - ADULT  Goal: Achieves optimal ventilation and oxygenation  Description: INTERVENTIONS:  - Assess for changes in respiratory status  - Assess for changes in mentation and behavior  - Position to facilitate oxygenation and minimize respiratory effort  - Oxygen supplementation based on oxygen saturation or ABGs  - Provide Smoking Cessation handout, if applicable  - Encourage broncho-pulmonary hygiene including cough, deep breathe, Incentive Spirometry  - Assess the need for suctioning and perform as needed  - Assess and instruct to report SOB or any respiratory difficulty  - Respiratory Therapy support as indicated  - Manage/alleviate anxiety  - Monitor for signs/symptoms of CO2 retention  Outcome: Progressing     Problem: RESPIRATORY - ADULT  Goal: Achieves optimal ventilation and oxygenation  Description: INTERVENTIONS:  - Assess for changes in respiratory status  - Assess  for changes in mentation and behavior  - Position to facilitate oxygenation and minimize respiratory effort  - Oxygen supplementation based on oxygen saturation or ABGs  - Provide Smoking Cessation handout, if applicable  - Encourage broncho-pulmonary hygiene including cough, deep breathe, Incentive Spirometry  - Assess the need for suctioning and perform as needed  - Assess and instruct to report SOB or any respiratory difficulty  - Respiratory Therapy support as indicated  - Manage/alleviate anxiety  - Monitor for signs/symptoms of CO2 retention  Outcome: Progressing     Problem: PAIN - ADULT  Goal: Verbalizes/displays adequate comfort level or patient's stated pain goal  Description: INTERVENTIONS:  - Encourage pt to monitor pain and request assistance  - Assess pain using appropriate pain scale  - Administer analgesics based on type and severity of pain and evaluate response  - Implement non-pharmacological measures as appropriate and evaluate response  - Consider cultural and social influences on pain and pain management  - Manage/alleviate anxiety  - Utilize distraction and/or relaxation techniques  - Monitor for opioid side effects  - Notify MD/LIP if interventions unsuccessful or patient reports new pain  - Anticipate increased pain with activity and pre-medicate as appropriate  Outcome: Not Progressing     Problem: Diabetes/Glucose Control  Goal: Glucose maintained within prescribed range  Description: INTERVENTIONS:  - Monitor Blood Glucose as ordered  - Assess for signs and symptoms of hyperglycemia and hypoglycemia  - Administer ordered medications to maintain glucose within target range  - Assess barriers to adequate nutritional intake and initiate nutrition consult as needed  - Instruct patient on self management of diabetes  Outcome: Progressing

## 2025-05-25 NOTE — DISCHARGE INSTRUCTIONS
Sometimes managing your health at home requires assistance.  The [facility] team has recognized your preference to use [facility], Phone: [phone]. A representative from the home health agency will contact you or your family to schedule your first visit.       CARDIAC SURGERY DISCHARGE INSTRUCTIONS  Shower daily and clean your surgical incisions with soap and water then swab with Betadine 2 x day until your follow up office visit.  Do not drive for one month  Do not lift/push/pull greater than 10 lbs for 3 months   Do not soak (submerge) your incision in water such as tub/pool/etc for one month  Take Plavix 75 mg daily with Aspirin 81 mg daily for 3 months. After 3 months- stop the Plavix and continue on Aspirin 81 mg daily.

## 2025-05-25 NOTE — PROGRESS NOTES
[facility]  part of [facility]    Report of Consultation    Elias Shah Patient Status:  Inpatient    1965 MRN [id number]   Location [facility] 2W/SW Attending Don Alejandra MD   Hosp Day # 3 PCP Bobby Swift MD     Date of Admission:  2025    Subjective:   POD 2 s/p CABG x2. No acute events overnight. Doing well this AM. Pain well controlled, OOBTC, using IS, tolerating diet.       Physical Exam:   Blood pressure 127/76, pulse 90, temperature 97.5 °F (36.4 °C), temperature source Temporal, resp. rate 21, height 5' 11\" (1.803 m), weight 271 lb 2.7 oz (123 kg), SpO2 96%.  Intake/Output:   Last 3 shifts: I/O last 3 completed shifts:  In: 3452.1 [P.O.:1260; I.V.:1222.1; IV PIGGYBACK:970]  Out: 3205 [Urine:2495; Chest Tube:710]   This shift: No intake/output data recorded.     Vent Settings:      Hemodynamic parameters (last 24 hours): PAP: (28)/(24) 28/24  CO:  [6 L/min] 6 L/min  CI:  [2.5 L/min/m2] 2.5 L/min/m2    Scheduled Meds: Scheduled Medications[1]    Continuous Infusions: Medication Infusions[2]    Patient is awake alert and in no distress.  The head is normocephalic.  Pupils are equally round and reactive.  The sclera are nonicteric.  Hearing appears adequate in both ears.  Neck is soft supple with adequate range of motion no appreciable adenopathy mass or JVD.  Carotids are 2+ without bruits.  Lungs are clear to auscultation and percussion.  Cardiac exam there is a normal rate and rhythm, incision CDI, CT serosang  Abdomen is soft nontender with no organomegaly mass or appreciable aneurysm.  Extremities are without cyanosis clubbing or edema.  Pulses are equal and intact in all extremities.  Skin is warm and dry without obvious pathological lesions ulcerations or wounds, leg soft, dry dressing   Neurologic exam is grossly normal with motor and sensory intact and no obvious cranial nerve abnormalities.    Results:     Laboratory Data:  Lab Results    Component Value Date    WBC 12.9 (H) 05/25/2025    HGB 10.9 (L) 05/25/2025    HCT 32.4 (L) 05/25/2025    .0 05/25/2025    CREATSERUM 0.95 05/25/2025    BUN 10 05/25/2025     05/25/2025    K 3.6 05/25/2025     05/25/2025    CO2 26.0 05/25/2025     (H) 05/25/2025    CA 8.5 (L) 05/25/2025    ALB 4.4 05/22/2025    ALKPHO 103 05/22/2025    TP 6.8 05/22/2025    AST 25 05/22/2025    ALT 58 (H) 05/22/2025    PTT 25.8 05/23/2025    INR 1.25 (H) 05/23/2025    PTP 16.4 (H) 05/23/2025    T4F 1.1 08/02/2024    TSH 3.082 05/23/2025    PSA 1.3 12/19/2018    CRP <0.29 06/27/2023    MG 2.2 05/24/2025         Imaging:  XR CHEST AP PORTABLE  (CPT=71045)  Result Date: 5/23/2025  PROCEDURE: XR CHEST AP PORTABLE  (CPT=71045) TIME: 13 12.   COMPARISON: [facility], XR CHEST PA + LAT CHEST (CPT=71046), 5/22/2025, 12:26 PM.  INDICATIONS: [place], ET tube, and OG tube placement.  TECHNIQUE:   Single view.   FINDINGS/IMPRESSION:    1. There is an endotracheal tube with tip approximately 4 cm above the alessandra.  There is a right IJ [place]-Nikolai catheter with tip overlying the expected position of the distal right ventricle.  There are mediastinal drains.  2. The heart mediastinal structures are enlarged.  Pulmonary vascularity is slightly increased.  There are trace bilateral pleural effusions.  The findings are suggestive of slight fluid overload.  3. Lung volumes are diminished.  There are small streaky opacities at the lung bases likely representing bibasilar atelectasis.    Dictated by (CST): Emigdio Giang MD on 5/23/2025 at 1:39 PM     Finalized by (CST): Emigdio Giang MD on 5/23/2025 at 1:41 PM               Assessment and Plan:    POD 2 s/p CABG x2, doing well. On BB. On Lasix IV 20 BID. Pull cordis and hernandez. Possibly pull CT if scant output after he walks this AM. Encourage OOBTC, IS, ambulation. All questions answered. Tentatively home Tue.     Thank you for allowing me to participate in the  care of your patient.    Gregg Marc MD, FACS  Cardiothoracic Surgeon  [facility]  [phone]          *This note was dictated, in part, using a computerized recognition system and may contain unintended errors.         [1]    levothyroxine  25 mcg Oral Daily @ 0700    atorvastatin  40 mg Oral Daily    insulin aspart  1-5 Units Subcutaneous TID CC    furosemide  20 mg Intravenous BID (Diuretic)    sennosides  8.6 mg Oral BID    docusate sodium  100 mg Oral BID    metoclopramide  10 mg Intravenous Q6H    famotidine  20 mg Oral BID    Or    famotidine  20 mg Intravenous BID    metoprolol tartrate  25 mg Oral 2x Daily(Beta Blocker)    mupirocin  1 Application Nasal BID    chlorhexidine gluconate  15 mL Mouth/Throat BID    multivitamin  1 tablet Oral Daily    ascorbic acid  500 mg Oral TID    enoxaparin  40 mg Subcutaneous Daily    clopidogrel  75 mg Oral Daily    aspirin  81 mg Oral Daily    amiodarone  200 mg Oral BID with meals    [Held by provider] losartan  25 mg Oral Daily   [2]    sodium chloride 10 mL/hr at 05/23/25 1326    dexmedetomidine Stopped (05/23/25 1700)    DOBUTamine Stopped (05/24/25 0300)    nitroGLYCERIN in dextrose 5%      norepinephrine Stopped (05/24/25 0755)    clevidipine      amiodarone Stopped (05/24/25 0700)

## 2025-05-25 NOTE — PLAN OF CARE
Patient alert and oriented.  Complains of some incisional pain, PRN pain medications appropriate.  CT/Courdis/Stringer removed without complications.  Ambulated in hallway well.  Problem: CARDIOVASCULAR - ADULT  Goal: Maintains optimal cardiac output and hemodynamic stability  Description: INTERVENTIONS:  - Monitor vital signs, rhythm, and trends  - Monitor for bleeding, hypotension and signs of decreased cardiac output  - Evaluate effectiveness of vasoactive medications to optimize hemodynamic stability  - Monitor arterial and/or venous puncture sites for bleeding and/or hematoma  - Assess quality of pulses, skin color and temperature  - Assess for signs of decreased coronary artery perfusion - ex. Angina  - Evaluate fluid balance, assess for edema, trend weights  Outcome: Progressing  Goal: Absence of cardiac arrhythmias or at baseline  Description: INTERVENTIONS:  - Continuous cardiac monitoring, monitor vital signs, obtain 12 lead EKG if indicated  - Evaluate effectiveness of antiarrhythmic and heart rate control medications as ordered  - Initiate emergency measures for life threatening arrhythmias  - Monitor electrolytes and administer replacement therapy as ordered  Outcome: Progressing     Problem: RESPIRATORY - ADULT  Goal: Achieves optimal ventilation and oxygenation  Description: INTERVENTIONS:  - Assess for changes in respiratory status  - Assess for changes in mentation and behavior  - Position to facilitate oxygenation and minimize respiratory effort  - Oxygen supplementation based on oxygen saturation or ABGs  - Provide Smoking Cessation handout, if applicable  - Encourage broncho-pulmonary hygiene including cough, deep breathe, Incentive Spirometry  - Assess the need for suctioning and perform as needed  - Assess and instruct to report SOB or any respiratory difficulty  - Respiratory Therapy support as indicated  - Manage/alleviate anxiety  - Monitor for signs/symptoms of CO2 retention  Outcome:  Progressing     Problem: SAFETY ADULT - FALL  Goal: Free from fall injury  Description: INTERVENTIONS:  - Assess pt frequently for physical needs  - Identify cognitive and physical deficits and behaviors that affect risk of falls.  - [place] fall precautions as indicated by assessment.  - Educate pt/family on patient safety including physical limitations  - Instruct pt to call for assistance with activity based on assessment  - Modify environment to reduce risk of injury  - Provide assistive devices as appropriate  - Consider OT/PT consult to assist with strengthening/mobility  - Encourage toileting schedule  Outcome: Progressing     Problem: PAIN - ADULT  Goal: Verbalizes/displays adequate comfort level or patient's stated pain goal  Description: INTERVENTIONS:  - Encourage pt to monitor pain and request assistance  - Assess pain using appropriate pain scale  - Administer analgesics based on type and severity of pain and evaluate response  - Implement non-pharmacological measures as appropriate and evaluate response  - Consider cultural and social influences on pain and pain management  - Manage/alleviate anxiety  - Utilize distraction and/or relaxation techniques  - Monitor for opioid side effects  - Notify MD/LIP if interventions unsuccessful or patient reports new pain  - Anticipate increased pain with activity and pre-medicate as appropriate  Outcome: Progressing     Problem: Diabetes/Glucose Control  Goal: Glucose maintained within prescribed range  Description: INTERVENTIONS:  - Monitor Blood Glucose as ordered  - Assess for signs and symptoms of hyperglycemia and hypoglycemia  - Administer ordered medications to maintain glucose within target range  - Assess barriers to adequate nutritional intake and initiate nutrition consult as needed  - Instruct patient on self management of diabetes  Outcome: Progressing

## 2025-05-25 NOTE — PROGRESS NOTES
[facility]  part of [facility]    Progress Note    Elias Shah Patient Status:  Inpatient    1965 MRN [id number]   Location [facility] 2W/SW Attending Don Alejandra MD   Hosp Day # 3 PCP Bboby Swift MD       Subjective:   Elias Shah is a(n) 59 year old male was seen and examined  No acute events overnight  Resting in chair tonight  Feeling well  No cp, sob, f,c,n,v, abd pain or HA    Objective:   Blood pressure 127/76, pulse 90, temperature 97.5 °F (36.4 °C), temperature source Temporal, resp. rate 21, height 5' 11\" (1.803 m), weight 271 lb 2.7 oz (123 kg), SpO2 96%.    Gen: AAOx4, resting comfortably  Pulm: Lungs clear, normal respiratory effort, CT in place   CV: Heart with regular rate and rhythm  Abd: Abdomen soft, nontender, nondistended, bowel sounds present  Neuro: No acute focal deficits  MSK: moves extremities  Skin: Warm and dry  Psych: calm affect  Ext: no cyanosis    Current Inpatient Medications:   Current Hospital Medications[1]    Assessment and Plan:   Acute CAD  - s/p CABG x2  - extubated successfully, doing well  - wound care and chest tube care per CT surgery  - plan asa, plavix, statin and beta blocker     HTN  - stable, cont current meds and adjust as needed     HL  - statin     Hx of etoh abuse  - monitor ciwa     Obesity  - BMI 37  - diet and exercise when stable    Full code  DVT Ppx: lovenox  MDM: High   Results:     Recent Labs   Lab 25  1233 25  0351 25  0507   RBC 4.34 3.56* 3.49*   HGB 13.9 11.3* 10.9*   HCT 39.6 31.8* 32.4*   MCV 91.2 89.3 92.8   MCH 32.0 31.7 31.2   MCHC 35.1 35.5 33.6   RDW 12.2 12.5 12.7   NEPRELIM  --  7.38  --    WBC 16.3* 9.9 12.9*   .0 174.0 151.0         Recent Labs   Lab 25  1233 25  2148 25  0351 25  0507   *  --  127* 106*   BUN 11  --  10 10   CREATSERUM 0.94  --  0.99 0.95   EGFRCR 93  --  88 92   CA 8.0*  --  7.8* 8.5*     --  140  137   K 4.3 3.7 3.6 3.6     --  109 103   CO2 23.0  --  24.0 26.0         Imaging:   XR CHEST AP PORTABLE  (CPT=71045)  Result Date: 5/23/2025  PROCEDURE: XR CHEST AP PORTABLE  (CPT=71045) TIME: 13 12.   COMPARISON: [facility], XR CHEST PA + LAT CHEST (CPT=71046), 5/22/2025, 12:26 PM.  INDICATIONS: Fountain, ET tube, and OG tube placement.  TECHNIQUE:   Single view.   FINDINGS/IMPRESSION:    1. There is an endotracheal tube with tip approximately 4 cm above the alessandra.  There is a right IJ Fountain-Nikolai catheter with tip overlying the expected position of the distal right ventricle.  There are mediastinal drains.  2. The heart mediastinal structures are enlarged.  Pulmonary vascularity is slightly increased.  There are trace bilateral pleural effusions.  The findings are suggestive of slight fluid overload.  3. Lung volumes are diminished.  There are small streaky opacities at the lung bases likely representing bibasilar atelectasis.    Dictated by (CST): Emigdio Giang MD on 5/23/2025 at 1:39 PM     Finalized by (CST): Emigdio Giang MD on 5/23/2025 at 1:41 PM          EKG 12 Lead  Result Date: 5/24/2025  Normal sinus rhythm Nonspecific T wave abnormality Abnormal ECG When compared with ECG of 23-MAY-2025 12:51, Right bundle branch block is no longer Present Confirmed by SAAD ORTEGA, SUE ([id number]) on 5/24/2025 10:44:19 AM    EKG 12 Lead  Result Date: 5/23/2025  Normal sinus rhythm Right bundle branch block Abnormal ECG No previous ECGs found in Muse Confirmed by RILEY XAVIER ([id number]) on 5/23/2025 4:52:20 PM        Don Alejandra MD  5/25/2025          [1]   Current Facility-Administered Medications:     levothyroxine (Synthroid) tab 25 mcg, 25 mcg, Oral, Daily @ 0700    atorvastatin (Lipitor) tab 40 mg, 40 mg, Oral, Daily    insulin aspart (NovoLOG) 100 Units/mL FlexPen 1-5 Units, 1-5 Units, Subcutaneous, TID CC    furosemide (Lasix) 10 mg/mL injection 20 mg, 20 mg, Intravenous, BID  (Diuretic)    sodium chloride 0.9% infusion, , Intravenous, Continuous    melatonin tab 3 mg, 3 mg, Oral, Nightly PRN    sennosides (Senokot) tab 8.6 mg, 8.6 mg, Oral, BID    docusate sodium (Colace) cap 100 mg, 100 mg, Oral, BID    polyethylene glycol (PEG 3350) (Miralax) 17 g oral packet 17 g, 17 g, Oral, Daily PRN    bisacodyl (Dulcolax) 10 MG rectal suppository 10 mg, 10 mg, Rectal, Daily PRN    metoclopramide (Reglan) 5 mg/mL injection 10 mg, 10 mg, Intravenous, Q6H    ondansetron (Zofran) 4 MG/2ML injection 4 mg, 4 mg, Intravenous, Q6H PRN    famotidine (Pepcid) tab 20 mg, 20 mg, Oral, BID **OR** famotidine (Pepcid) 20 mg/2mL injection 20 mg, 20 mg, Intravenous, BID    dexmedeTOMIDine in sodium chloride 0.9% (Precedex) 400 mcg/100mL infusion premix, 0.2-1.5 mcg/kg/hr (Dosing Weight), Intravenous, Continuous    DOBUTamine in dextrose 5% (Dobutrex) 500 mg/250mL infusion premix, 2.5-20 mcg/kg/min (Dosing Weight), Intravenous, Continuous PRN    nitroGLYCERIN in dextrose 5% 50 mg/250mL infusion premix, 5-300 mcg/min, Intravenous, Continuous PRN    norepinephrine (Levophed) 4 mg/250mL infusion premix, 0.5-30 mcg/min, Intravenous, Continuous PRN    metoprolol tartrate (Lopressor) tab 25 mg, 25 mg, Oral, 2x Daily(Beta Blocker)    clevidipine (Cleviprex) 25 MG/50ML IV infusion, 1-6 mg/hr, Intravenous, Continuous    potassium chloride 20 mEq/100mL IVPB premix 20 mEq, 20 mEq, Intravenous, PRN **OR** potassium chloride 40 mEq/100mL IVPB premix (central line) 40 mEq, 40 mEq, Intravenous, PRN    magnesium sulfate in dextrose 5% 1 g/100mL infusion premix 1 g, 1 g, Intravenous, PRN    magnesium sulfate in sterile water for injection 2 g/50mL IVPB premix 2 g, 2 g, Intravenous, PRN    mupirocin (Bactroban) 2% nasal ointment 1 Application, 1 Application, Nasal, BID    chlorhexidine gluconate (Peridex) 0.12 % oral solution 15 mL, 15 mL, Mouth/Throat, BID    multivitamin (Tab-A-Gris/Beta Carotene) tab 1 tablet, 1 tablet, Oral,  Daily    ascorbic acid (Vitamin C) tab 500 mg, 500 mg, Oral, TID    enoxaparin (Lovenox) 40 MG/0.4ML SUBQ injection 40 mg, 40 mg, Subcutaneous, Daily    acetaminophen (Tylenol) tab 650 mg, 650 mg, Oral, Q4H PRN **OR** HYDROcodone-acetaminophen (Norco) 5-325 MG per tab 1 tablet, 1 tablet, Oral, Q4H PRN **OR** HYDROcodone-acetaminophen (Norco) 5-325 MG per tab 2 tablet, 2 tablet, Oral, Q4H PRN    morphINE PF 4 MG/ML injection 2 mg, 2 mg, Intravenous, Q2H PRN **OR** morphINE PF 4 MG/ML injection 4 mg, 4 mg, Intravenous, Q2H PRN    clopidogrel (Plavix) tab 75 mg, 75 mg, Oral, Daily    aspirin DR tab 81 mg, 81 mg, Oral, Daily    glucose (Dex4) 15 GM/59ML oral liquid 15 g, 15 g, Oral, Q15 Min PRN **OR** glucose (Glutose) 40% oral gel 15 g, 15 g, Oral, Q15 Min PRN **OR** glucose-vitamin C (Dex-4) chewable tab 4 tablet, 4 tablet, Oral, Q15 Min PRN **OR** dextrose 50% injection 50 mL, 50 mL, Intravenous, Q15 Min PRN **OR** glucose (Dex4) 15 GM/59ML oral liquid 30 g, 30 g, Oral, Q15 Min PRN **OR** glucose (Glutose) 40% oral gel 30 g, 30 g, Oral, Q15 Min PRN **OR** glucose-vitamin C (Dex-4) chewable tab 8 tablet, 8 tablet, Oral, Q15 Min PRN    [] amiodarone in dextrose 4.14% (Cordarone) 360 mg/200mL infusion premix, 1 mg/min, Intravenous, Continuous **FOLLOWED BY** amiodarone in dextrose 4.14% (Cordarone) 360 mg/200mL infusion premix, 0.5 mg/min, Intravenous, Continuous    amiodarone (Pacerone) tab 200 mg, 200 mg, Oral, BID with meals    [Held by provider] losartan (Cozaar) tab 25 mg, 25 mg, Oral, Daily

## 2025-05-25 NOTE — PROGRESS NOTES
[facility]  part of [facility]    Progress Note      Assessment and Plan:   1.  Status post coronary artery bypass grafting-postoperative day #2.   Chest x-ray is unimpressive.  Extubated without event.  Continues to do well without new complaint.    Recommendations:  1.  Aggressive antipain, antithrombosis and antiatelectasis measures  2.  As per CV surgery and cardiology  3.  Out of bed to chair  4.  Will follow clinically.  5.  Aspirin amiodarone clopidogrel furosemide metoprolol    2.  DVT prophylaxis-Lovenox    3.  History of alcohol use    4.  CODE STATUS-full      Subjective:   Elias Shah is a(n) 59 year old male who is breathing well without new complaint    Objective:   Blood pressure (!) 141/126, pulse 97, temperature 97.5 °F (36.4 °C), temperature source Temporal, resp. rate 19, height 180.3 cm (5' 11\"), weight 271 lb 2.7 oz (123 kg), SpO2 95%.    Physical Exam alert white male  HEENT examination is unremarkable with pupils equal round and reactive to light and accommodation.   Neck without adenopathy, thyromegaly, JVD nor bruit.   Lungs clear to auscultation and percussion.  Cardiac regular rate and rhythm no murmur.   Abdomen nontender, without hepatosplenomegaly and no mass appreciable.   Extremities without clubbing cyanosis nor edema.   Neurologic grossly intact with symmetric tone and strength and reflex.  Skin without gross abnormality     Results:     Lab Results   Component Value Date    WBC 12.9 05/25/2025    HGB 10.9 05/25/2025    HCT 32.4 05/25/2025    .0 05/25/2025    CREATSERUM 0.95 05/25/2025    BUN 10 05/25/2025     05/25/2025    K 3.6 05/25/2025     05/25/2025    CO2 26.0 05/25/2025     05/25/2025    CA 8.5 05/25/2025    ESRML 19 05/25/2025     Chest x-ray-mild left basilar haziness    Selvin Goldberg MD  Medical Director, Critical Care, [facility]  Medical Director, [facility]  Pager: [phone]

## 2025-05-25 NOTE — PROGRESS NOTES
Cardiology Progress Note    Elias Shah Patient Status:  Inpatient    1965 MRN [id number]   Location [facility] 2W/SW Attending Eileen Munoz MD   Hosp Day # 3 PCP Bobby Swift MD       Subjective: Feeling better, postop day 1 with urgent LIMA to LAD and SVG to OM with severe distal left main disease.  Preserved ejection fraction    Objective:   Temp: 97.5 °F (36.4 °C)  Pulse: 90  Resp: 21  BP: 127/76  AO: 101/70    Intake/Output:     Intake/Output Summary (Last 24 hours) at 2025 0925  Last data filed at 2025 0500  Gross per 24 hour   Intake 1473.7 ml   Output 2165 ml   Net -691.3 ml       Last 3 Weights   25 0500 271 lb 2.7 oz (123 kg)   25 0600 269 lb 6.4 oz (122.2 kg)   25 0500 261 lb 14.5 oz (118.8 kg)   25 1500 263 lb 7.2 oz (119.5 kg)   25 0816 269 lb (122 kg)   25 0916 272 lb (123.4 kg)   25 1016 275 lb (124.7 kg)   25 1601 269 lb (122 kg)       Tele: Ventricular arrhythmia noted    Physical Exam:     General: Alert and oriented x 3. No apparent distress. No respiratory or constitutional distress.  HEENT: Normocephalic, anicteric sclera, neck supple.  Neck: No JVD, carotids 2+, no bruits.  Cardiac: Regular rate and rhythm. S1, S2 normal. No murmur, pericardial rub, S3.  Lungs: Clear without wheezes, rales, rhonchi or dullness.  Normal excursions and effort.  Abdomen: Soft, non-tender. BS-present.  Extremities: Without clubbing, cyanosis or edema.  Peripheral pulses are 2+.  Neurologic: Alert and oriented, normal affect.  Skin: Warm and dry.     Laboratory/Data:    Labs:           Recent Labs   Lab 25  1518 25  1233 25  0351 25  0507   WBC 6.7 16.3* 9.9 12.9*   HGB 15.4 13.9 11.3* 10.9*   MCV 89.6 91.2 89.3 92.8   .0 185.0 174.0 151.0   INR 0.97 1.25*  --   --        Recent Labs   Lab 25  1518 25  1233 25  2148 25  0351 25  0507    139  --  140 137    K 3.9 4.3 3.7 3.6 3.6    109  --  109 103   CO2 26.0 23.0  --  24.0 26.0   BUN 10 11  --  10 10   CREATSERUM 1.01 0.94  --  0.99 0.95   CA 9.4 8.0*  --  7.8* 8.5*   MG  --  1.7 1.8 2.2  --    GLU 96 146*  --  127* 106*       Recent Labs   Lab 05/22/25  1518   ALT 58*   AST 25   ALB 4.4       No results for input(s): \"TROP\" in the last 168 hours.    Allergies:   Allergies[1]    Medications:  Current Hospital Medications[2]      Assessment:  Postop day #1 for CABG x 2 with LIMA to LAD and SVG to OM  Normal LVEF  No arrhythmias overnight    Plan:  Continue amiodarone protocol  Beta-blockers, statin and aspirin Plavix.      Cesar Marion MD    3         [1] No Known Allergies  [2]   Current Facility-Administered Medications   Medication Dose Route Frequency    levothyroxine (Synthroid) tab 25 mcg  25 mcg Oral Daily @ 0700    atorvastatin (Lipitor) tab 40 mg  40 mg Oral Daily    insulin aspart (NovoLOG) 100 Units/mL FlexPen 1-5 Units  1-5 Units Subcutaneous TID CC    furosemide (Lasix) 10 mg/mL injection 20 mg  20 mg Intravenous BID (Diuretic)    sodium chloride 0.9% infusion   Intravenous Continuous    melatonin tab 3 mg  3 mg Oral Nightly PRN    sennosides (Senokot) tab 8.6 mg  8.6 mg Oral BID    docusate sodium (Colace) cap 100 mg  100 mg Oral BID    polyethylene glycol (PEG 3350) (Miralax) 17 g oral packet 17 g  17 g Oral Daily PRN    bisacodyl (Dulcolax) 10 MG rectal suppository 10 mg  10 mg Rectal Daily PRN    metoclopramide (Reglan) 5 mg/mL injection 10 mg  10 mg Intravenous Q6H    ondansetron (Zofran) 4 MG/2ML injection 4 mg  4 mg Intravenous Q6H PRN    famotidine (Pepcid) tab 20 mg  20 mg Oral BID    Or    famotidine (Pepcid) 20 mg/2mL injection 20 mg  20 mg Intravenous BID    dexmedeTOMIDine in sodium chloride 0.9% (Precedex) 400 mcg/100mL infusion premix  0.2-1.5 mcg/kg/hr (Dosing Weight) Intravenous Continuous    DOBUTamine in dextrose 5% (Dobutrex) 500 mg/250mL infusion premix  2.5-20 mcg/kg/min  (Dosing Weight) Intravenous Continuous PRN    nitroGLYCERIN in dextrose 5% 50 mg/250mL infusion premix  5-300 mcg/min Intravenous Continuous PRN    norepinephrine (Levophed) 4 mg/250mL infusion premix  0.5-30 mcg/min Intravenous Continuous PRN    metoprolol tartrate (Lopressor) tab 25 mg  25 mg Oral 2x Daily(Beta Blocker)    clevidipine (Cleviprex) 25 MG/50ML IV infusion  1-6 mg/hr Intravenous Continuous    potassium chloride 20 mEq/100mL IVPB premix 20 mEq  20 mEq Intravenous PRN    Or    potassium chloride 40 mEq/100mL IVPB premix (central line) 40 mEq  40 mEq Intravenous PRN    magnesium sulfate in dextrose 5% 1 g/100mL infusion premix 1 g  1 g Intravenous PRN    magnesium sulfate in sterile water for injection 2 g/50mL IVPB premix 2 g  2 g Intravenous PRN    mupirocin (Bactroban) 2% nasal ointment 1 Application  1 Application Nasal BID    chlorhexidine gluconate (Peridex) 0.12 % oral solution 15 mL  15 mL Mouth/Throat BID    multivitamin (Tab-A-Gris/Beta Carotene) tab 1 tablet  1 tablet Oral Daily    ascorbic acid (Vitamin C) tab 500 mg  500 mg Oral TID    enoxaparin (Lovenox) 40 MG/0.4ML SUBQ injection 40 mg  40 mg Subcutaneous Daily    acetaminophen (Tylenol) tab 650 mg  650 mg Oral Q4H PRN    Or    HYDROcodone-acetaminophen (Norco) 5-325 MG per tab 1 tablet  1 tablet Oral Q4H PRN    Or    HYDROcodone-acetaminophen (Norco) 5-325 MG per tab 2 tablet  2 tablet Oral Q4H PRN    morphINE PF 4 MG/ML injection 2 mg  2 mg Intravenous Q2H PRN    Or    morphINE PF 4 MG/ML injection 4 mg  4 mg Intravenous Q2H PRN    clopidogrel (Plavix) tab 75 mg  75 mg Oral Daily    aspirin DR tab 81 mg  81 mg Oral Daily    glucose (Dex4) 15 GM/59ML oral liquid 15 g  15 g Oral Q15 Min PRN    Or    glucose (Glutose) 40% oral gel 15 g  15 g Oral Q15 Min PRN    Or    glucose-vitamin C (Dex-4) chewable tab 4 tablet  4 tablet Oral Q15 Min PRN    Or    dextrose 50% injection 50 mL  50 mL Intravenous Q15 Min PRN    Or    glucose (Dex4) 15  GM/59ML oral liquid 30 g  30 g Oral Q15 Min PRN    Or    glucose (Glutose) 40% oral gel 30 g  30 g Oral Q15 Min PRN    Or    glucose-vitamin C (Dex-4) chewable tab 8 tablet  8 tablet Oral Q15 Min PRN    amiodarone in dextrose 4.14% (Cordarone) 360 mg/200mL infusion premix  0.5 mg/min Intravenous Continuous    amiodarone (Pacerone) tab 200 mg  200 mg Oral BID with meals    [Held by provider] losartan (Cozaar) tab 25 mg  25 mg Oral Daily

## 2025-05-26 ENCOUNTER — APPOINTMENT (OUTPATIENT)
Dept: GENERAL RADIOLOGY | Facility: HOSPITAL | Age: 60
End: 2025-05-26
Attending: STUDENT IN AN ORGANIZED HEALTH CARE EDUCATION/TRAINING PROGRAM
Payer: COMMERCIAL

## 2025-05-26 PROCEDURE — 99233 SBSQ HOSP IP/OBS HIGH 50: CPT | Performed by: INTERNAL MEDICINE

## 2025-05-26 PROCEDURE — 71046 X-RAY EXAM CHEST 2 VIEWS: CPT | Performed by: STUDENT IN AN ORGANIZED HEALTH CARE EDUCATION/TRAINING PROGRAM

## 2025-05-26 PROCEDURE — 99233 SBSQ HOSP IP/OBS HIGH 50: CPT | Performed by: HOSPITALIST

## 2025-05-26 RX ORDER — ENOXAPARIN SODIUM 100 MG/ML
40 INJECTION SUBCUTANEOUS DAILY
Status: DISCONTINUED | OUTPATIENT
Start: 2025-05-26 | End: 2025-05-27

## 2025-05-26 RX ADMIN — METOPROLOL TARTRATE 25 MG: 25 TABLET, FILM COATED ORAL at 18:10:00

## 2025-05-26 RX ADMIN — DOXEPIN HYDROCHLORIDE 1 TABLET: 50 CAPSULE ORAL at 08:09:00

## 2025-05-26 RX ADMIN — ASPIRIN 81 MG: 81 TABLET ORAL at 08:09:00

## 2025-05-26 RX ADMIN — DOCUSATE SODIUM 100 MG: 100 CAPSULE, LIQUID FILLED ORAL at 08:09:00

## 2025-05-26 RX ADMIN — ENOXAPARIN SODIUM 40 MG: 100 INJECTION SUBCUTANEOUS at 10:00:00

## 2025-05-26 RX ADMIN — LEVOTHYROXINE SODIUM 25 MCG: 25 TABLET ORAL at 05:28:00

## 2025-05-26 RX ADMIN — AMIODARONE HYDROCHLORIDE 200 MG: 200 TABLET ORAL at 17:11:00

## 2025-05-26 RX ADMIN — FAMOTIDINE 20 MG: 20 TABLET, FILM COATED ORAL at 08:09:00

## 2025-05-26 RX ADMIN — ASCORBIC ACID 500 MG: 500 MG TABLET ORAL at 16:15:00

## 2025-05-26 RX ADMIN — ASCORBIC ACID 500 MG: 500 MG TABLET ORAL at 20:00:00

## 2025-05-26 RX ADMIN — FAMOTIDINE 20 MG: 20 TABLET, FILM COATED ORAL at 20:00:00

## 2025-05-26 RX ADMIN — HYDROCODONE BITARTRATE AND ACETAMINOPHEN 2 TABLET: 5; 325 TABLET ORAL at 05:28:00

## 2025-05-26 RX ADMIN — DOCUSATE SODIUM 100 MG: 100 CAPSULE, LIQUID FILLED ORAL at 20:00:00

## 2025-05-26 RX ADMIN — METOCLOPRAMIDE HYDROCHLORIDE 10 MG: 5 INJECTION INTRAMUSCULAR; INTRAVENOUS at 05:49:00

## 2025-05-26 RX ADMIN — METOCLOPRAMIDE HYDROCHLORIDE 10 MG: 5 INJECTION INTRAMUSCULAR; INTRAVENOUS at 12:51:00

## 2025-05-26 RX ADMIN — FUROSEMIDE 20 MG: 10 INJECTION INTRAMUSCULAR; INTRAVENOUS at 17:11:00

## 2025-05-26 RX ADMIN — ATORVASTATIN CALCIUM 40 MG: 40 TABLET, FILM COATED ORAL at 08:09:00

## 2025-05-26 RX ADMIN — METOPROLOL TARTRATE 25 MG: 25 TABLET, FILM COATED ORAL at 05:28:00

## 2025-05-26 RX ADMIN — METOCLOPRAMIDE HYDROCHLORIDE 10 MG: 5 INJECTION INTRAMUSCULAR; INTRAVENOUS at 00:19:00

## 2025-05-26 RX ADMIN — CLOPIDOGREL BISULFATE 75 MG: 75 TABLET ORAL at 08:09:00

## 2025-05-26 RX ADMIN — SENNOSIDES 8.6 MG: 8.6 MG TABLET ORAL at 20:00:00

## 2025-05-26 RX ADMIN — FUROSEMIDE 20 MG: 10 INJECTION INTRAMUSCULAR; INTRAVENOUS at 08:09:00

## 2025-05-26 RX ADMIN — ASCORBIC ACID 500 MG: 500 MG TABLET ORAL at 08:09:00

## 2025-05-26 RX ADMIN — AMIODARONE HYDROCHLORIDE 200 MG: 200 TABLET ORAL at 08:09:00

## 2025-05-26 RX ADMIN — SENNOSIDES 8.6 MG: 8.6 MG TABLET ORAL at 08:09:00

## 2025-05-26 RX ADMIN — METOCLOPRAMIDE HYDROCHLORIDE 10 MG: 5 INJECTION INTRAMUSCULAR; INTRAVENOUS at 18:10:00

## 2025-05-26 RX ADMIN — CHLORHEXIDINE GLUCONATE ORAL RINSE 15 ML: 1.2 SOLUTION DENTAL at 08:09:00

## 2025-05-26 RX ADMIN — CHLORHEXIDINE GLUCONATE ORAL RINSE 15 ML: 1.2 SOLUTION DENTAL at 20:00:00

## 2025-05-26 NOTE — PROGRESS NOTES
[facility]  part of [facility]    Report of Consultation    Elias Shah Patient Status:  Inpatient    1965 MRN [id number]   Location [facility] 2W/SW Attending Don Alejandra MD   Hosp Day # 4 PCP Bobby Swift MD     Date of Admission:  2025    Subjective:   POD 3 s/p CABG x2. No acute events overnight. Doing excellent this AM. Pain well controlled, OOBTC, using IS, tolerating diet. CT, hernandez, yvon, cordis out. Almost ready to go home.       Physical Exam:   Blood pressure 125/73, pulse 96, temperature 98 °F (36.7 °C), resp. rate (!) 27, height 5' 11\" (1.803 m), weight 263 lb 0.1 oz (119.3 kg), SpO2 93%.  Intake/Output:   Last 3 shifts: I/O last 3 completed shifts:  In: 500 [P.O.:400; IV PIGGYBACK:100]  Out: 4900 [Urine:4740; Chest Tube:160]   This shift: No intake/output data recorded.     Vent Settings:      Hemodynamic parameters (last 24 hours):      Scheduled Meds: Scheduled Medications[1]    Continuous Infusions: Medication Infusions[2]    Patient is awake alert and in no distress.  The head is normocephalic.  Pupils are equally round and reactive.  The sclera are nonicteric.  Hearing appears adequate in both ears.  Neck is soft supple with adequate range of motion no appreciable adenopathy mass or JVD.  Carotids are 2+ without bruits.  Lungs are clear to auscultation and percussion.  Cardiac exam there is a normal rate and rhythm, incision CDI  Abdomen is soft nontender with no organomegaly mass or appreciable aneurysm.  Extremities are without cyanosis clubbing or edema.  Pulses are equal and intact in all extremities.  Skin is warm and dry without obvious pathological lesions ulcerations or wounds, leg soft, dry dressing   Neurologic exam is grossly normal with motor and sensory intact and no obvious cranial nerve abnormalities.    Results:     Laboratory Data:  Lab Results   Component Value Date    WBC 12.9 (H) 2025    HGB 10.9 (L) 2025     HCT 32.4 (L) 05/25/2025    .0 05/25/2025    CREATSERUM 0.95 05/25/2025    BUN 10 05/25/2025     05/25/2025    K 3.6 05/25/2025     05/25/2025    CO2 26.0 05/25/2025     (H) 05/25/2025    CA 8.5 (L) 05/25/2025    ALB 4.4 05/22/2025    ALKPHO 103 05/22/2025    TP 6.8 05/22/2025    AST 25 05/22/2025    ALT 58 (H) 05/22/2025    PTT 25.8 05/23/2025    INR 1.25 (H) 05/23/2025    PTP 16.4 (H) 05/23/2025    T4F 1.1 08/02/2024    TSH 3.082 05/23/2025    PSA 1.3 12/19/2018    ESRML 19 05/25/2025    CRP <0.29 06/27/2023    MG 2.2 05/24/2025         Imaging:  XR CHEST PA + LAT CHEST (CYW=63656)  Result Date: 5/26/2025  CONCLUSION:  1. Postoperative changes from a recent CABG.  [place]-Nikolai catheter and left basilar chest tube have been removed.  No pneumothorax. 2. Stable small bilateral pleural effusions suggesting mild CHF or fluid overload. 3. Unchanged mild passive atelectasis at the left lung base.    Dictated by (CST): Andrew Bueno MD on 5/26/2025 at 7:45 AM     Finalized by (CST): Andrew Bueno MD on 5/26/2025 at 7:47 AM               Assessment and Plan:    POD 3 s/p CABG x2, doing well. On BB, HR BP good. On Lasix IV 20 BID, diuresing well on RA. Encourage OOBTC, IS, ambulation. All questions answered. Home tomorrow.     Thank you for allowing me to participate in the care of your patient.    Gregg Marc MD, FACS  Cardiothoracic Surgeon  [facility]  [phone]          *This note was dictated, in part, using a computerized recognition system and may contain unintended errors.         [1]    enoxaparin  40 mg Subcutaneous Daily    levothyroxine  25 mcg Oral Daily @ 0700    atorvastatin  40 mg Oral Daily    furosemide  20 mg Intravenous BID (Diuretic)    sennosides  8.6 mg Oral BID    docusate sodium  100 mg Oral BID    metoclopramide  10 mg Intravenous Q6H    famotidine  20 mg Oral BID    Or    famotidine  20 mg Intravenous BID    metoprolol tartrate   25 mg Oral 2x Daily(Beta Blocker)    mupirocin  1 Application Nasal BID    chlorhexidine gluconate  15 mL Mouth/Throat BID    multivitamin  1 tablet Oral Daily    ascorbic acid  500 mg Oral TID    clopidogrel  75 mg Oral Daily    aspirin  81 mg Oral Daily    amiodarone  200 mg Oral BID with meals    [Held by provider] losartan  25 mg Oral Daily   [2]    sodium chloride 10 mL/hr at 05/23/25 1326    dexmedetomidine Stopped (05/23/25 1700)    DOBUTamine Stopped (05/24/25 0300)    nitroGLYCERIN in dextrose 5%      norepinephrine Stopped (05/24/25 0755)    clevidipine      amiodarone Stopped (05/24/25 0700)

## 2025-05-26 NOTE — PHYSICAL THERAPY NOTE
PHYSICAL THERAPY EVALUATION - INPATIENT     Room Number: 230/230-A  Evaluation Date: 5/26/2025  Type of Evaluation: Initial   Physician Order: PT Eval and Treat    Presenting Problem: s/p CABG  Co-Morbidities : SANTORO, SOB, HTN, HL, JAMES  Reason for Therapy: Mobility Dysfunction and Discharge Planning    PHYSICAL THERAPY ASSESSMENT   Patient is a 59 year old male admitted 5/22/2025 for CABGx2.  Prior to admission, patient's baseline is indep with ADLs and mobility, working full time at a desk job.  Patient is currently functioning below baseline with transfers, gait, and stair negotiation.  Patient is requiring supervision and contact guard assist with inc time as a result of the following impairments: decreased functional strength and decreased muscular endurance.  Physical Therapy will continue to follow for duration of hospitalization.    Patient will benefit from continued skilled PT Services for duration of hospitalization, however, given the patient is functioning near baseline level do not anticipate skilled therapy needs at discharge ; anticipate cardiac rehab.    PLAN DURING HOSPITALIZATION  Nursing Mobility Recommendation : 1 Assist  PT Device Recommendation:  (tub bench\)  PT Treatment Plan: Bed mobility, Body mechanics, Endurance, Energy conservation, Patient education, Family education, Gait training, Strengthening, Stair training, Transfer training, Balance training  Rehab Potential : Good  Frequency (Obs): Daily     PHYSICAL THERAPY MEDICAL/SOCIAL HISTORY       Problem List  Active Problems:    Abnormal cardiac CT angiography      HOME SITUATION  Type of Home: House  Home Layout: One level (with basement, doesn't need to access)  Stairs to Enter : 1   Railing: No    Stairs to Bedroom: 0         Lives With: Spouse    Drives: Yes           SUBJECTIVE  \"I walked already this morning.\"    PHYSICAL THERAPY EXAMINATION   OBJECTIVE  Precautions: Sternal, Cardiac  Fall Risk: Standard fall risk    WEIGHT BEARING  RESTRICTION       PAIN ASSESSMENT  Ratin  Location: denies       COGNITION  Overall Cognitive Status:  WFL - within functional limits    RANGE OF MOTION AND STRENGTH ASSESSMENT  Upper extremity ROM and strength are within functional limits within sternal precautions  Lower extremity ROM is within functional limits   Lower extremity strength is within functional limits     BALANCE  Static Sitting: Good  Dynamic Sitting: Fair +  Static Standing: Fair +  Dynamic Standing: Fair +    ADDITIONAL TESTS                                    NEUROLOGICAL FINDINGS                      ACTIVITY TOLERANCE  Pulse: 95 (107 with amb)  Heart Rate Source: Monitor     BP: 122/83  BP Location: Left arm  BP Method: Automatic  Patient Position: Sitting    O2 WALK  Oxygen Therapy  SPO2% on Room Air at Rest: 95  SPO2% Ambulation on Room Air: 94    AM-PAC '6-Clicks' INPATIENT SHORT FORM - BASIC MOBILITY  How much difficulty does the patient currently have...  Patient Difficulty: Turning over in bed (including adjusting bedclothes, sheets and blankets)?: A Little   Patient Difficulty: Sitting down on and standing up from a chair with arms (e.g., wheelchair, bedside commode, etc.): A Little   Patient Difficulty: Moving from lying on back to sitting on the side of the bed?: A Little   How much help from another person does the patient currently need...   Help from Another: Moving to and from a bed to a chair (including a wheelchair)?: A Little   Help from Another: Need to walk in hospital room?: A Little   Help from Another: Climbing 3-5 steps with a railing?: A Little     AM-PAC Score:  Raw Score: 18   Approx Degree of Impairment: 46.58%   Standardized Score (AM-PAC Scale): 43.63   CMS Modifier (G-Code): CK    FUNCTIONAL ABILITY STATUS  Functional Mobility/Gait Assessment  Gait Assistance: Supervision  Distance (ft): 500  Assistive Device: None  Pattern:  (dec paulette)    Sit to Stand: stand-by assist    Exercise/Education Provided:  Body  mechanics  Gait training  Transfer training  PT eval    Skilled Therapy Provided: Pt ok to be seen per CHITO Neri. Pt educ in role of PT and PT POC. Pt willing to participate in therapy session. Pt motivated to amb. Pt educ in sternal precautions and functional implications. Pt demo'd safe amb, discussed home safety and activity pacing for home. Pt educ in toilet transfers but declined to participate at this time. Pt's spouse would like to see pt perform toilet transfer with hospital staff prior to d/c. Spouse and pt educ in WBOS and use of heart pillow for sit<>stand, especially from toilet. Educ pt and spouse on tub transfer bench; spouse plans to purchase one. Pt and spouse educ in car transfers as well.    The patient's Approx Degree of Impairment: 46.58% has been calculated based on documentation in the [facility] '6 clicks' Inpatient Basic Mobility Short Form.  Research supports that patients with this level of impairment may benefit from home with cardiac rehab.  Final disposition will be made by interdisciplinary medical team.    Patient End of Session: Up in chair, Needs met, Call light within reach, RN aware of session/findings, All patient questions and concerns addressed, Hospital anti-slip socks    CURRENT GOALS  Goals to be met by: 5/30/25  Patient Goal Patient's self-stated goal is: go home   Goal #1 Patient is able to demonstrate supine - sit EOB @ level: modified independent     Goal #1   Current Status    Goal #2 Patient is able to demonstrate transfers Sit to/from Stand at assistance level: modified independent with none     Goal #2  Current Status    Goal #3 Patient is able to ambulate 400 feet with assist device: none at assistance level: modified independent   Goal #3   Current Status    Goal #4 Patient will negotiate 1 stairs/one curb w/ assistive device and supervision   Goal #4   Current Status    Goal #5 Patient to demonstrate independence with home activity/exercise instructions provided to  patient in preparation for discharge.   Goal #5   Current Status    Goal #6    Goal #6  Current Status      Patient Evaluation Complexity Level:  History Moderate - 1 or 2 personal factors and/or co-morbidities   Examination of body systems Moderate - addressing a total of 3 or more elements   Clinical Presentation Low- Stable   Clinical Decision Making  Low Complexity     Gait Training: 10 minutes  Therapeutic Activity:  15 minutes

## 2025-05-26 NOTE — PLAN OF CARE
Problem: Patient Centered Care  Goal: Patient preferences are identified and integrated in the patient's plan of care  Description: Interventions:  - What would you like us to know as we care for you?   - Provide timely, complete, and accurate information to patient/family  - Incorporate patient and family knowledge, values, beliefs, and cultural backgrounds into the planning and delivery of care  - Encourage patient/family to participate in care and decision-making at the level they choose  - Honor patient and family perspectives and choices  Outcome: Progressing     Problem: Patient/Family Goals  Goal: Patient/Family Long Term Goal  Description: Patient's Long Term Goal:     Interventions:  -   - Se additional Care Plan goals for specific interventions  Outcome: Progressing  Goal: Patient/Family Short Term Goal  Description: Patient's Short Term Goal:     Interventions:   -   - See additional Care Plan goals for specific interventions  Outcome: Progressing     Problem: CARDIOVASCULAR - ADULT  Goal: Maintains optimal cardiac output and hemodynamic stability  Description: INTERVENTIONS:  - Monitor vital signs, rhythm, and trends  - Monitor for bleeding, hypotension and signs of decreased cardiac output  - Evaluate effectiveness of vasoactive medications to optimize hemodynamic stability  - Monitor arterial and/or venous puncture sites for bleeding and/or hematoma  - Assess quality of pulses, skin color and temperature  - Assess for signs of decreased coronary artery perfusion - ex. Angina  - Evaluate fluid balance, assess for edema, trend weights  Outcome: Progressing  Goal: Absence of cardiac arrhythmias or at baseline  Description: INTERVENTIONS:  - Continuous cardiac monitoring, monitor vital signs, obtain 12 lead EKG if indicated  - Evaluate effectiveness of antiarrhythmic and heart rate control medications as ordered  - Initiate emergency measures for life threatening arrhythmias  - Monitor electrolytes and  administer replacement therapy as ordered  Outcome: Progressing     Problem: SAFETY ADULT - FALL  Goal: Free from fall injury  Description: INTERVENTIONS:  - Assess pt frequently for physical needs  - Identify cognitive and physical deficits and behaviors that affect risk of falls.  - [place] fall precautions as indicated by assessment.  - Educate pt/family on patient safety including physical limitations  - Instruct pt to call for assistance with activity based on assessment  - Modify environment to reduce risk of injury  - Provide assistive devices as appropriate  - Consider OT/PT consult to assist with strengthening/mobility  - Encourage toileting schedule  Outcome: Progressing     Problem: PAIN - ADULT  Goal: Verbalizes/displays adequate comfort level or patient's stated pain goal  Description: INTERVENTIONS:  - Encourage pt to monitor pain and request assistance  - Assess pain using appropriate pain scale  - Administer analgesics based on type and severity of pain and evaluate response  - Implement non-pharmacological measures as appropriate and evaluate response  - Consider cultural and social influences on pain and pain management  - Manage/alleviate anxiety  - Utilize distraction and/or relaxation techniques  - Monitor for opioid side effects  - Notify MD/LIP if interventions unsuccessful or patient reports new pain  - Anticipate increased pain with activity and pre-medicate as appropriate  Outcome: Progressing     Problem: RESPIRATORY - ADULT  Goal: Achieves optimal ventilation and oxygenation  Description: INTERVENTIONS:  - Assess for changes in respiratory status  - Assess for changes in mentation and behavior  - Position to facilitate oxygenation and minimize respiratory effort  - Oxygen supplementation based on oxygen saturation or ABGs  - Provide Smoking Cessation handout, if applicable  - Encourage broncho-pulmonary hygiene including cough, deep breathe, Incentive Spirometry  - Assess the need for  suctioning and perform as needed  - Assess and instruct to report SOB or any respiratory difficulty  - Respiratory Therapy support as indicated  - Manage/alleviate anxiety  - Monitor for signs/symptoms of CO2 retention  Outcome: Progressing     Problem: Diabetes/Glucose Control  Goal: Glucose maintained within prescribed range  Description: INTERVENTIONS:  - Monitor Blood Glucose as ordered  - Assess for signs and symptoms of hyperglycemia and hypoglycemia  - Administer ordered medications to maintain glucose within target range  - Assess barriers to adequate nutritional intake and initiate nutrition consult as needed  - Instruct patient on self management of diabetes  Outcome: Progressing   Pt A&Ox3, vss, norco x2 for pain, up will standby assist and walker, moves well, slept off and thru nite.

## 2025-05-26 NOTE — PLAN OF CARE
Problem: Patient Centered Care  Goal: Patient preferences are identified and integrated in the patient's plan of care  Description: Interventions:  - What would you like us to know as we care for you?   - Provide timely, complete, and accurate information to patient/family  - Incorporate patient and family knowledge, values, beliefs, and cultural backgrounds into the planning and delivery of care  - Encourage patient/family to participate in care and decision-making at the level they choose  - Honor patient and family perspectives and choices  Outcome: Progressing     Problem: Patient/Family Goals  Goal: Patient/Family Long Term Goal  Description: Patient's Long Term Goal:     Interventions:  -   - See additional Care Plan goals for specific interventions  Outcome: Progressing  Goal: Patient/Family Short Term Goal  Description: Patient's Short Term Goal:     Interventions:   - - See additional Care Plan goals for specific interventions  Outcome: Progressing     Problem: CARDIOVASCULAR - ADULT  Goal: Maintains optimal cardiac output and hemodynamic stability  Description: INTERVENTIONS:  - Monitor vital signs, rhythm, and trends  - Monitor for bleeding, hypotension and signs of decreased cardiac output  - Evaluate effectiveness of vasoactive medications to optimize hemodynamic stability  - Monitor arterial and/or venous puncture sites for bleeding and/or hematoma  - Assess quality of pulses, skin color and temperature  - Assess for signs of decreased coronary artery perfusion - ex. Angina  - Evaluate fluid balance, assess for edema, trend weights  Outcome: Progressing  Goal: Absence of cardiac arrhythmias or at baseline  Description: INTERVENTIONS:  - Continuous cardiac monitoring, monitor vital signs, obtain 12 lead EKG if indicated  - Evaluate effectiveness of antiarrhythmic and heart rate control medications as ordered  - Initiate emergency measures for life threatening arrhythmias  - Monitor electrolytes and  administer replacement therapy as ordered  Outcome: Progressing     Problem: RESPIRATORY - ADULT  Goal: Achieves optimal ventilation and oxygenation  Description: INTERVENTIONS:  - Assess for changes in respiratory status  - Assess for changes in mentation and behavior  - Position to facilitate oxygenation and minimize respiratory effort  - Oxygen supplementation based on oxygen saturation or ABGs  - Provide Smoking Cessation handout, if applicable  - Encourage broncho-pulmonary hygiene including cough, deep breathe, Incentive Spirometry  - Assess the need for suctioning and perform as needed  - Assess and instruct to report SOB or any respiratory difficulty  - Respiratory Therapy support as indicated  - Manage/alleviate anxiety  - Monitor for signs/symptoms of CO2 retention  Outcome: Progressing     Problem: SAFETY ADULT - FALL  Goal: Free from fall injury  Description: INTERVENTIONS:  - Assess pt frequently for physical needs  - Identify cognitive and physical deficits and behaviors that affect risk of falls.  - [place] fall precautions as indicated by assessment.  - Educate pt/family on patient safety including physical limitations  - Instruct pt to call for assistance with activity based on assessment  - Modify environment to reduce risk of injury  - Provide assistive devices as appropriate  - Consider OT/PT consult to assist with strengthening/mobility  - Encourage toileting schedule  Outcome: Progressing     Problem: PAIN - ADULT  Goal: Verbalizes/displays adequate comfort level or patient's stated pain goal  Description: INTERVENTIONS:  - Encourage pt to monitor pain and request assistance  - Assess pain using appropriate pain scale  - Administer analgesics based on type and severity of pain and evaluate response  - Implement non-pharmacological measures as appropriate and evaluate response  - Consider cultural and social influences on pain and pain management  - Manage/alleviate anxiety  - Utilize  distraction and/or relaxation techniques  - Monitor for opioid side effects  - Notify MD/LIP if interventions unsuccessful or patient reports new pain  - Anticipate increased pain with activity and pre-medicate as appropriate  Outcome: Progressing     Problem: Diabetes/Glucose Control  Goal: Glucose maintained within prescribed range  Description: INTERVENTIONS:  - Monitor Blood Glucose as ordered  - Assess for signs and symptoms of hyperglycemia and hypoglycemia  - Administer ordered medications to maintain glucose within target range  - Assess barriers to adequate nutritional intake and initiate nutrition consult as needed  - Instruct patient on self management of diabetes  Outcome: Progressing

## 2025-05-26 NOTE — PROGRESS NOTES
Pulmonary/ICU/Critical Care Progress Note        Reason for Consultation: post CABG  Referring Physician: Dr. Robbins    Subjective:  On RA, afebrile     From the initial consultation  HPI: 60 yo male with hx of HTN, HLP, ETOH use with shortness of breath.   Was evaluated by Dr. Marley from pulm and cardiology  Underwent cardiac w/u showed significant 80% left main stenosis but no disease in the right coronary artery and no disease in the LAD or circumflex branches.   Pt underwent CABG x 2 today.   No intra-op blood pdts  Seen post op on ventilator  On  2, small dose levo, on amicar, amio, propofol, precedex, insulin, dextrose      REVIEW OF SYSTEMS:  Positives and negatives as noted in HPI. All other review of systems otherwise are either limited (due to pt/family inability to provide) or negative.      PAST MEDICAL HISTORY:  Past Medical History[1]      PAST SURGICAL HISTORY:  Past Surgical History[2]      PAST SOCIAL HISTORY:  Social Hx on file[3]      PAST FAMILY HISTORY:  Family History[4]      ALLERGIES:  Allergies[5]      MEDS:  Home Medications:  Medications Taking[6]    Scheduled Medication:  Scheduled Medications[7]  Continuous Infusing Medication:  Medication Infusions[8]  PRN Medications:  PRN Medications[9]       PHYSICAL EXAM:  /82 (BP Location: Left arm)   Pulse 89   Temp 98 °F (36.7 °C)   Resp 21   Ht 5' 11\" (1.803 m)   Wt 263 lb 0.1 oz (119.3 kg)   SpO2 95%   BMI 36.68 kg/m²        CONSTITUTIONAL: comfortable   HEENT: atraumatic normocephalic  MOUTH: MMM  NECK/THROAT: no JVD. Trachea midline. No obvious thyromegaly  LUNG: clear upper b/l no wheezing, crackles. Chest symmetric with respiratory motion  HEART: regular rate and rhythm, no obvious murmers or gallops noted. Midsternal incision   ABD: soft non tender. + bowel sounds. No organomegaly noted  EXT: no clubbing, cyanosis, or edema noted. Pulses intact grossly  SKIN: warm, dry. No obvious lesions noted  LYMPH: no obvious  LAD      IMAGES:   CXR 5/26/25  CONCLUSION:   1. Postoperative changes from a recent CABG.  [place] catheter and left basilar chest tube have been removed.  No pneumothorax.   2. Stable small bilateral pleural effusions suggesting mild CHF or fluid overload.   3. Unchanged mild passive atelectasis at the left lung base.       LABS:  Recent Labs   Lab 05/23/25  1233 05/24/25  0351 05/25/25  0507   RBC 4.34 3.56* 3.49*   HGB 13.9 11.3* 10.9*   HCT 39.6 31.8* 32.4*   MCV 91.2 89.3 92.8   MCH 32.0 31.7 31.2   MCHC 35.1 35.5 33.6   RDW 12.2 12.5 12.7   NEPRELIM  --  7.38  --    WBC 16.3* 9.9 12.9*   .0 174.0 151.0       Recent Labs   Lab 05/22/25  1518 05/23/25  1233 05/23/25  2148 05/24/25  0351 05/25/25  0507   GLU 96 146*  --  127* 106*   BUN 10 11  --  10 10   CREATSERUM 1.01 0.94  --  0.99 0.95   EGFRCR 86 93  --  88 92   CA 9.4 8.0*  --  7.8* 8.5*   ALB 4.4  --   --   --   --     139  --  140 137   K 3.9 4.3 3.7 3.6 3.6    109  --  109 103   CO2 26.0 23.0  --  24.0 26.0   ALKPHO 103  --   --   --   --    AST 25  --   --   --   --    ALT 58*  --   --   --   --    BILT 1.0  --   --   --   --    TP 6.8  --   --   --   --        ASSESSMENT/PLAN:  CAD s/p CABG x 2 on 5/23/25  -chest tube and swan removed as per CV surgery   -post op cardiac gtts as per CV  -asa, statin, plavix, bb, amio, lasix as per cards    Post op vent support  -extubated and RA    Hx of ETOH use  -doing ok. Low threshold to initiate CIWA    Proph  -lovenox    Dispo  -full code    Thank you for the opportunity to care for Elias Shah.      MINNA Mccall DO, MPH  Pulmonary Critical Care Medicine  [facility]                         [1]   Past Medical History:  Diagnosis Date    ALCOHOL USE     Cervical radiculopathy     Essential hypertension     High blood pressure 3/2020    High cholesterol 2015    Hyperlipidemia     Sleep apnea    [2]   Past Surgical History:  Procedure  Laterality Date    Colonoscopy  5/2015    Other surgical history  02/10/2018    tumor removal carotid    Vasectomy  2002   [3]   Social History  Socioeconomic History    Marital status:      Spouse name: Juana     Number of children: 2   Occupational History    Occupation:     Tobacco Use    Smoking status: Never     Passive exposure: Never    Smokeless tobacco: Never    Tobacco comments:     may have a cigar once or twice a year   Vaping Use    Vaping status: Never Used   Substance and Sexual Activity    Alcohol use: Yes     Alcohol/week: 20.0 standard drinks of alcohol     Types: 20 Standard drinks or equivalent per week     Comment: Have cut back on alcohol    Drug use: No   [4]   Family History  Problem Relation Age of Onset    Cancer Father         bladder, prostate    Heart Disorder Father         MI at age 40    Cancer Mother 81        lung, smoker    Other (hypothyroidism) Mother     Cancer Sister     Other (Rheumatoid Arthritis) Sister     Hypertension Brother     Stroke Brother         age 56 (second stroke)    Heart Attack Paternal Grandfather         age 50   [5] No Known Allergies  [6]   Outpatient Medications Marked as Taking for the 5/22/25 encounter (Hospital Encounter) with [facility] IVS RM2 CATH LAB   Medication Sig Dispense Refill    DICLOFENAC 50 MG Oral Tab EC TAKE 1 TABLET BY MOUTH 3 TIMES DAILY AS NEEDED. 90 tablet 0    levothyroxine 25 MCG Oral Tab Take 1 tablet (25 mcg total) by mouth before breakfast. 90 tablet 3    atorvastatin 40 MG Oral Tab TAKE 1 TABLET BY MOUTH EVERY DAY 90 tablet 3    aspirin (ASPIR-LOW) 81 MG Oral Tab EC Take 1 tablet (81 mg total) by mouth daily. 30 tablet 11    Losartan Potassium 25 MG Oral Tab Take 1 tablet (25 mg total) by mouth daily. 30 tablet 5   [7]    levothyroxine  25 mcg Oral Daily @ 0700    atorvastatin  40 mg Oral Daily    furosemide  20 mg Intravenous BID (Diuretic)    sennosides  8.6 mg Oral BID    docusate sodium  100 mg Oral BID     metoclopramide  10 mg Intravenous Q6H    famotidine  20 mg Oral BID    Or    famotidine  20 mg Intravenous BID    metoprolol tartrate  25 mg Oral 2x Daily(Beta Blocker)    mupirocin  1 Application Nasal BID    chlorhexidine gluconate  15 mL Mouth/Throat BID    multivitamin  1 tablet Oral Daily    ascorbic acid  500 mg Oral TID    enoxaparin  40 mg Subcutaneous Daily    clopidogrel  75 mg Oral Daily    aspirin  81 mg Oral Daily    amiodarone  200 mg Oral BID with meals    [Held by provider] losartan  25 mg Oral Daily   [8]    sodium chloride 10 mL/hr at 05/23/25 1326    dexmedetomidine Stopped (05/23/25 1700)    DOBUTamine Stopped (05/24/25 0300)    nitroGLYCERIN in dextrose 5%      norepinephrine Stopped (05/24/25 0755)    clevidipine      amiodarone Stopped (05/24/25 0700)   [9]   melatonin    polyethylene glycol (PEG 3350)    bisacodyl    ondansetron    DOBUTamine    nitroGLYCERIN in dextrose 5%    norepinephrine    potassium chloride **OR** potassium chloride    magnesium sulfate in dextrose 5%    magnesium sulfate in sterile water for injection    acetaminophen **OR** HYDROcodone-acetaminophen **OR** HYDROcodone-acetaminophen    morphINE **OR** morphINE    glucose **OR** glucose **OR** glucose-vitamin C **OR** dextrose **OR** glucose **OR** glucose **OR** glucose-vitamin C

## 2025-05-26 NOTE — PROGRESS NOTES
Cardiology Progress Note    Elias Shah Patient Status:  Inpatient    1965 MRN [id number]   Location [facility] 2W/SW Attending Eileen Munoz MD   Hosp Day # 4 PCP Bobby Swift MD       Subjective: Feeling better, postop day 2 with urgent LIMA to LAD and SVG to OM with severe distal left main disease.  Preserved ejection fraction    Telemetry 80 to 90 bpm sinus    Objective:   Temp: 98 °F (36.7 °C)  Pulse: 89  Resp: 21  BP: 112/82    Intake/Output:     Intake/Output Summary (Last 24 hours) at 2025 0901  Last data filed at 2025 0552  Gross per 24 hour   Intake 500 ml   Output 3480 ml   Net -2980 ml       Last 3 Weights   25 0552 263 lb 0.1 oz (119.3 kg)   25 0500 271 lb 2.7 oz (123 kg)   25 0600 269 lb 6.4 oz (122.2 kg)   25 0500 261 lb 14.5 oz (118.8 kg)   25 1500 263 lb 7.2 oz (119.5 kg)   25 0816 269 lb (122 kg)   25 0916 272 lb (123.4 kg)   25 1016 275 lb (124.7 kg)   25 1601 269 lb (122 kg)       Tele: Ventricular arrhythmia noted    Physical Exam:     General: Alert and oriented x 3. No apparent distress. No respiratory or constitutional distress.  HEENT: Normocephalic, anicteric sclera, neck supple.  Neck: No JVD, carotids 2+, no bruits.  Cardiac: Regular rate and rhythm. S1, S2 normal. No murmur, pericardial rub, S3.  Lungs: Clear without wheezes, rales, rhonchi or dullness.  Normal excursions and effort.  Abdomen: Soft, non-tender. BS-present.  Extremities: Without clubbing, cyanosis or edema.  Peripheral pulses are 2+.  Neurologic: Alert and oriented, normal affect.  Skin: Warm and dry.     Laboratory/Data:    Labs:           Recent Labs   Lab 25  1518 25  1233 25  0351 25  0507   WBC 6.7 16.3* 9.9 12.9*   HGB 15.4 13.9 11.3* 10.9*   MCV 89.6 91.2 89.3 92.8   .0 185.0 174.0 151.0   INR 0.97 1.25*  --   --        Recent Labs   Lab 25  1518 25  1233 25  2413  05/24/25  0351 05/25/25  0507    139  --  140 137   K 3.9 4.3 3.7 3.6 3.6    109  --  109 103   CO2 26.0 23.0  --  24.0 26.0   BUN 10 11  --  10 10   CREATSERUM 1.01 0.94  --  0.99 0.95   CA 9.4 8.0*  --  7.8* 8.5*   MG  --  1.7 1.8 2.2  --    GLU 96 146*  --  127* 106*       Recent Labs   Lab 05/22/25  1518   ALT 58*   AST 25   ALB 4.4       No results for input(s): \"TROP\" in the last 168 hours.    Allergies:   Allergies[1]    Medications:  Current Hospital Medications[2]      Assessment:  Postop day #2for CABG x 2 with LIMA to LAD and SVG to OM  Normal LVEF  No arrhythmias overnight    Plan:  Continue amiodarone protocol  Beta-blockers, statin and aspirin Plavix.  Postop volume overload started on Lasix 20 mg IV twice daily with great response continue  Chest tube management as per CT surgery        L3    Jeremy Dennis MD         [1] No Known Allergies  [2]   Current Facility-Administered Medications   Medication Dose Route Frequency    levothyroxine (Synthroid) tab 25 mcg  25 mcg Oral Daily @ 0700    atorvastatin (Lipitor) tab 40 mg  40 mg Oral Daily    furosemide (Lasix) 10 mg/mL injection 20 mg  20 mg Intravenous BID (Diuretic)    sodium chloride 0.9% infusion   Intravenous Continuous    melatonin tab 3 mg  3 mg Oral Nightly PRN    sennosides (Senokot) tab 8.6 mg  8.6 mg Oral BID    docusate sodium (Colace) cap 100 mg  100 mg Oral BID    polyethylene glycol (PEG 3350) (Miralax) 17 g oral packet 17 g  17 g Oral Daily PRN    bisacodyl (Dulcolax) 10 MG rectal suppository 10 mg  10 mg Rectal Daily PRN    metoclopramide (Reglan) 5 mg/mL injection 10 mg  10 mg Intravenous Q6H    ondansetron (Zofran) 4 MG/2ML injection 4 mg  4 mg Intravenous Q6H PRN    famotidine (Pepcid) tab 20 mg  20 mg Oral BID    Or    famotidine (Pepcid) 20 mg/2mL injection 20 mg  20 mg Intravenous BID    dexmedeTOMIDine in sodium chloride 0.9% (Precedex) 400 mcg/100mL infusion premix  0.2-1.5 mcg/kg/hr (Dosing Weight) Intravenous  Continuous    DOBUTamine in dextrose 5% (Dobutrex) 500 mg/250mL infusion premix  2.5-20 mcg/kg/min (Dosing Weight) Intravenous Continuous PRN    nitroGLYCERIN in dextrose 5% 50 mg/250mL infusion premix  5-300 mcg/min Intravenous Continuous PRN    norepinephrine (Levophed) 4 mg/250mL infusion premix  0.5-30 mcg/min Intravenous Continuous PRN    metoprolol tartrate (Lopressor) tab 25 mg  25 mg Oral 2x Daily(Beta Blocker)    clevidipine (Cleviprex) 25 MG/50ML IV infusion  1-6 mg/hr Intravenous Continuous    potassium chloride 20 mEq/100mL IVPB premix 20 mEq  20 mEq Intravenous PRN    Or    potassium chloride 40 mEq/100mL IVPB premix (central line) 40 mEq  40 mEq Intravenous PRN    magnesium sulfate in dextrose 5% 1 g/100mL infusion premix 1 g  1 g Intravenous PRN    magnesium sulfate in sterile water for injection 2 g/50mL IVPB premix 2 g  2 g Intravenous PRN    mupirocin (Bactroban) 2% nasal ointment 1 Application  1 Application Nasal BID    chlorhexidine gluconate (Peridex) 0.12 % oral solution 15 mL  15 mL Mouth/Throat BID    multivitamin (Tab-A-Gris/Beta Carotene) tab 1 tablet  1 tablet Oral Daily    ascorbic acid (Vitamin C) tab 500 mg  500 mg Oral TID    enoxaparin (Lovenox) 40 MG/0.4ML SUBQ injection 40 mg  40 mg Subcutaneous Daily    acetaminophen (Tylenol) tab 650 mg  650 mg Oral Q4H PRN    Or    HYDROcodone-acetaminophen (Norco) 5-325 MG per tab 1 tablet  1 tablet Oral Q4H PRN    Or    HYDROcodone-acetaminophen (Norco) 5-325 MG per tab 2 tablet  2 tablet Oral Q4H PRN    morphINE PF 4 MG/ML injection 2 mg  2 mg Intravenous Q2H PRN    Or    morphINE PF 4 MG/ML injection 4 mg  4 mg Intravenous Q2H PRN    clopidogrel (Plavix) tab 75 mg  75 mg Oral Daily    aspirin DR tab 81 mg  81 mg Oral Daily    glucose (Dex4) 15 GM/59ML oral liquid 15 g  15 g Oral Q15 Min PRN    Or    glucose (Glutose) 40% oral gel 15 g  15 g Oral Q15 Min PRN    Or    glucose-vitamin C (Dex-4) chewable tab 4 tablet  4 tablet Oral Q15 Min PRN     Or    dextrose 50% injection 50 mL  50 mL Intravenous Q15 Min PRN    Or    glucose (Dex4) 15 GM/59ML oral liquid 30 g  30 g Oral Q15 Min PRN    Or    glucose (Glutose) 40% oral gel 30 g  30 g Oral Q15 Min PRN    Or    glucose-vitamin C (Dex-4) chewable tab 8 tablet  8 tablet Oral Q15 Min PRN    amiodarone in dextrose 4.14% (Cordarone) 360 mg/200mL infusion premix  0.5 mg/min Intravenous Continuous    amiodarone (Pacerone) tab 200 mg  200 mg Oral BID with meals    [Held by provider] losartan (Cozaar) tab 25 mg  25 mg Oral Daily

## 2025-05-26 NOTE — PROGRESS NOTES
[facility]  part of [facility]    Progress Note    Elias Shah Patient Status:  Inpatient    1965 MRN [id number]   Location [facility] 2W/SW Attending Don Alejandra MD   Hosp Day # 4 PCP Bobby Swift MD       Subjective:   Elias Shah is a(n) 59 year old male was seen and examined  No acute events overnight  Resting in chair  Wife at bedside   Feeling well  No cp, sob, f,c,n,v, abd pain or HA    Objective:   Blood pressure 125/73, pulse 96, temperature 98 °F (36.7 °C), resp. rate (!) 27, height 5' 11\" (1.803 m), weight 263 lb 0.1 oz (119.3 kg), SpO2 93%.    Gen: AAOx4, resting comfortably  Pulm: Lungs clear, normal respiratory effort, CT in place   CV: Heart with regular rate and rhythm  Abd: Abdomen soft, nontender, nondistended, bowel sounds present  Neuro: No acute focal deficits  MSK: moves extremities  Skin: Warm and dry  Psych: calm affect  Ext: no cyanosis    Current Inpatient Medications:   Current Hospital Medications[1]    Assessment and Plan:   Acute CAD  - s/p CABG x2  - extubated successfully, doing well  - CT and hernandez removed   - plan asa, plavix, statin and beta blocker     HTN  - stable, cont current meds and adjust as needed     HL  - statin     Hx of etoh abuse  - monitor ciwa     Obesity  - BMI 37  - diet and exercise when stable    Full code  DVT Ppx: lovenox  MDM: High   Results:     Recent Labs   Lab 25  1233 25  0351 25  0507   RBC 4.34 3.56* 3.49*   HGB 13.9 11.3* 10.9*   HCT 39.6 31.8* 32.4*   MCV 91.2 89.3 92.8   MCH 32.0 31.7 31.2   MCHC 35.1 35.5 33.6   RDW 12.2 12.5 12.7   NEPRELIM  --  7.38  --    WBC 16.3* 9.9 12.9*   .0 174.0 151.0         Recent Labs   Lab 25  1233 25  2148 25  0351 25  0507   *  --  127* 106*   BUN 11  --  10 10   CREATSERUM 0.94  --  0.99 0.95   EGFRCR 93  --  88 92   CA 8.0*  --  7.8* 8.5*     --  140 137   K 4.3 3.7 3.6 3.6     --   109 103   CO2 23.0  --  24.0 26.0         Imaging:   XR CHEST PA + LAT CHEST ([id number])  Result Date: 5/26/2025  CONCLUSION:  1. Postoperative changes from a recent CABG.  [place] catheter and left basilar chest tube have been removed.  No pneumothorax. 2. Stable small bilateral pleural effusions suggesting mild CHF or fluid overload. 3. Unchanged mild passive atelectasis at the left lung base.    Dictated by (CST): Andrew Bueno MD on 5/26/2025 at 7:45 AM     Finalized by (CST): Andrew Bueno MD on 5/26/2025 at 7:47 AM                    Don Alejandra MD  5/26/2025          [1]   Current Facility-Administered Medications:     enoxaparin (Lovenox) 40 MG/0.4ML SUBQ injection 40 mg, 40 mg, Subcutaneous, Daily    levothyroxine (Synthroid) tab 25 mcg, 25 mcg, Oral, Daily @ 0700    atorvastatin (Lipitor) tab 40 mg, 40 mg, Oral, Daily    furosemide (Lasix) 10 mg/mL injection 20 mg, 20 mg, Intravenous, BID (Diuretic)    sodium chloride 0.9% infusion, , Intravenous, Continuous    melatonin tab 3 mg, 3 mg, Oral, Nightly PRN    sennosides (Senokot) tab 8.6 mg, 8.6 mg, Oral, BID    docusate sodium (Colace) cap 100 mg, 100 mg, Oral, BID    polyethylene glycol (PEG 3350) (Miralax) 17 g oral packet 17 g, 17 g, Oral, Daily PRN    bisacodyl (Dulcolax) 10 MG rectal suppository 10 mg, 10 mg, Rectal, Daily PRN    metoclopramide (Reglan) 5 mg/mL injection 10 mg, 10 mg, Intravenous, Q6H    ondansetron (Zofran) 4 MG/2ML injection 4 mg, 4 mg, Intravenous, Q6H PRN    famotidine (Pepcid) tab 20 mg, 20 mg, Oral, BID **OR** famotidine (Pepcid) 20 mg/2mL injection 20 mg, 20 mg, Intravenous, BID    dexmedeTOMIDine in sodium chloride 0.9% (Precedex) 400 mcg/100mL infusion premix, 0.2-1.5 mcg/kg/hr (Dosing Weight), Intravenous, Continuous    DOBUTamine in dextrose 5% (Dobutrex) 500 mg/250mL infusion premix, 2.5-20 mcg/kg/min (Dosing Weight), Intravenous, Continuous PRN    nitroGLYCERIN in dextrose 5% 50 mg/250mL infusion premix,  5-300 mcg/min, Intravenous, Continuous PRN    norepinephrine (Levophed) 4 mg/250mL infusion premix, 0.5-30 mcg/min, Intravenous, Continuous PRN    metoprolol tartrate (Lopressor) tab 25 mg, 25 mg, Oral, 2x Daily(Beta Blocker)    clevidipine (Cleviprex) 25 MG/50ML IV infusion, 1-6 mg/hr, Intravenous, Continuous    potassium chloride 20 mEq/100mL IVPB premix 20 mEq, 20 mEq, Intravenous, PRN **OR** potassium chloride 40 mEq/100mL IVPB premix (central line) 40 mEq, 40 mEq, Intravenous, PRN    magnesium sulfate in dextrose 5% 1 g/100mL infusion premix 1 g, 1 g, Intravenous, PRN    magnesium sulfate in sterile water for injection 2 g/50mL IVPB premix 2 g, 2 g, Intravenous, PRN    mupirocin (Bactroban) 2% nasal ointment 1 Application, 1 Application, Nasal, BID    chlorhexidine gluconate (Peridex) 0.12 % oral solution 15 mL, 15 mL, Mouth/Throat, BID    multivitamin (Tab-A-Gris/Beta Carotene) tab 1 tablet, 1 tablet, Oral, Daily    ascorbic acid (Vitamin C) tab 500 mg, 500 mg, Oral, TID    acetaminophen (Tylenol) tab 650 mg, 650 mg, Oral, Q4H PRN **OR** HYDROcodone-acetaminophen (Norco) 5-325 MG per tab 1 tablet, 1 tablet, Oral, Q4H PRN **OR** HYDROcodone-acetaminophen (Norco) 5-325 MG per tab 2 tablet, 2 tablet, Oral, Q4H PRN    morphINE PF 4 MG/ML injection 2 mg, 2 mg, Intravenous, Q2H PRN **OR** morphINE PF 4 MG/ML injection 4 mg, 4 mg, Intravenous, Q2H PRN    clopidogrel (Plavix) tab 75 mg, 75 mg, Oral, Daily    aspirin DR tab 81 mg, 81 mg, Oral, Daily    glucose (Dex4) 15 GM/59ML oral liquid 15 g, 15 g, Oral, Q15 Min PRN **OR** glucose (Glutose) 40% oral gel 15 g, 15 g, Oral, Q15 Min PRN **OR** glucose-vitamin C (Dex-4) chewable tab 4 tablet, 4 tablet, Oral, Q15 Min PRN **OR** dextrose 50% injection 50 mL, 50 mL, Intravenous, Q15 Min PRN **OR** glucose (Dex4) 15 GM/59ML oral liquid 30 g, 30 g, Oral, Q15 Min PRN **OR** glucose (Glutose) 40% oral gel 30 g, 30 g, Oral, Q15 Min PRN **OR** glucose-vitamin C (Dex-4) chewable  tab 8 tablet, 8 tablet, Oral, Q15 Min PRN    [] amiodarone in dextrose 4.14% (Cordarone) 360 mg/200mL infusion premix, 1 mg/min, Intravenous, Continuous **FOLLOWED BY** amiodarone in dextrose 4.14% (Cordarone) 360 mg/200mL infusion premix, 0.5 mg/min, Intravenous, Continuous    amiodarone (Pacerone) tab 200 mg, 200 mg, Oral, BID with meals    [Held by provider] losartan (Cozaar) tab 25 mg, 25 mg, Oral, Daily

## 2025-05-27 VITALS
RESPIRATION RATE: 27 BRPM | TEMPERATURE: 98 F | BODY MASS INDEX: 36.82 KG/M2 | WEIGHT: 263 LBS | DIASTOLIC BLOOD PRESSURE: 81 MMHG | HEART RATE: 93 BPM | SYSTOLIC BLOOD PRESSURE: 110 MMHG | HEIGHT: 71 IN | OXYGEN SATURATION: 99 %

## 2025-05-27 LAB
ANION GAP SERPL CALC-SCNC: 10 MMOL/L (ref 0–18)
BASOPHILS # BLD AUTO: 0.05 X10(3) UL (ref 0–0.2)
BASOPHILS NFR BLD AUTO: 0.4 %
BUN BLD-MCNC: 15 MG/DL (ref 9–23)
BUN/CREAT SERPL: 16 (ref 10–20)
CALCIUM BLD-MCNC: 9 MG/DL (ref 8.7–10.4)
CHLORIDE SERPL-SCNC: 98 MMOL/L (ref 98–112)
CO2 SERPL-SCNC: 25 MMOL/L (ref 21–32)
CREAT BLD-MCNC: 0.94 MG/DL (ref 0.7–1.3)
DEPRECATED RDW RBC AUTO: 41.3 FL (ref 35.1–46.3)
EGFRCR SERPLBLD CKD-EPI 2021: 93 ML/MIN/1.73M2 (ref 60–?)
EOSINOPHIL # BLD AUTO: 0.3 X10(3) UL (ref 0–0.7)
EOSINOPHIL NFR BLD AUTO: 2.7 %
ERYTHROCYTE [DISTWIDTH] IN BLOOD BY AUTOMATED COUNT: 12.5 % (ref 11–15)
GLUCOSE BLD-MCNC: 133 MG/DL (ref 70–99)
HCT VFR BLD AUTO: 31.9 % (ref 39–53)
HGB BLD-MCNC: 11.2 G/DL (ref 13–17.5)
IMM GRANULOCYTES # BLD AUTO: 0.13 X10(3) UL (ref 0–1)
IMM GRANULOCYTES NFR BLD: 1.2 %
LYMPHOCYTES # BLD AUTO: 1.48 X10(3) UL (ref 1–4)
LYMPHOCYTES NFR BLD AUTO: 13.2 %
MAGNESIUM SERPL-MCNC: 2.4 MG/DL (ref 1.6–2.6)
MCH RBC QN AUTO: 31.6 PG (ref 26–34)
MCHC RBC AUTO-ENTMCNC: 35.1 G/DL (ref 31–37)
MCV RBC AUTO: 90.1 FL (ref 80–100)
MONOCYTES # BLD AUTO: 1.48 X10(3) UL (ref 0.1–1)
MONOCYTES NFR BLD AUTO: 13.2 %
NEUTROPHILS # BLD AUTO: 7.76 X10 (3) UL (ref 1.5–7.7)
NEUTROPHILS # BLD AUTO: 7.76 X10(3) UL (ref 1.5–7.7)
NEUTROPHILS NFR BLD AUTO: 69.3 %
OSMOLALITY SERPL CALC.SUM OF ELEC: 279 MOSM/KG (ref 275–295)
PLATELET # BLD AUTO: 236 10(3)UL (ref 150–450)
POTASSIUM SERPL-SCNC: 3.3 MMOL/L (ref 3.5–5.1)
RBC # BLD AUTO: 3.54 X10(6)UL (ref 4.3–5.7)
SODIUM SERPL-SCNC: 133 MMOL/L (ref 136–145)
WBC # BLD AUTO: 11.2 X10(3) UL (ref 4–11)

## 2025-05-27 PROCEDURE — 99239 HOSP IP/OBS DSCHRG MGMT >30: CPT | Performed by: HOSPITALIST

## 2025-05-27 PROCEDURE — 99232 SBSQ HOSP IP/OBS MODERATE 35: CPT | Performed by: INTERNAL MEDICINE

## 2025-05-27 RX ORDER — POTASSIUM CHLORIDE 1500 MG/1
40 TABLET, EXTENDED RELEASE ORAL EVERY 4 HOURS
Status: COMPLETED | OUTPATIENT
Start: 2025-05-27 | End: 2025-05-27

## 2025-05-27 RX ORDER — FAMOTIDINE 20 MG/1
20 TABLET, FILM COATED ORAL 2 TIMES DAILY
Qty: 60 TABLET | Refills: 0 | Status: SHIPPED | OUTPATIENT
Start: 2025-05-27 | End: 2025-06-26

## 2025-05-27 RX ORDER — CLOPIDOGREL BISULFATE 75 MG/1
75 TABLET ORAL DAILY
Qty: 90 TABLET | Refills: 0 | Status: SHIPPED | OUTPATIENT
Start: 2025-05-28 | End: 2025-08-26

## 2025-05-27 RX ORDER — POTASSIUM CHLORIDE 750 MG/1
10 TABLET, EXTENDED RELEASE ORAL 2 TIMES DAILY
Qty: 28 TABLET | Refills: 0 | Status: SHIPPED | OUTPATIENT
Start: 2025-05-27 | End: 2025-06-10

## 2025-05-27 RX ORDER — FUROSEMIDE 20 MG/1
20 TABLET ORAL 2 TIMES DAILY
Qty: 28 TABLET | Refills: 0 | Status: SHIPPED | OUTPATIENT
Start: 2025-05-27 | End: 2025-06-10

## 2025-05-27 RX ORDER — HYDROCODONE BITARTRATE AND ACETAMINOPHEN 5; 325 MG/1; MG/1
1 TABLET ORAL EVERY 4 HOURS PRN
Qty: 20 TABLET | Refills: 0 | Status: SHIPPED | OUTPATIENT
Start: 2025-05-27

## 2025-05-27 RX ORDER — DOXEPIN HYDROCHLORIDE 50 MG/1
1 CAPSULE ORAL DAILY
Qty: 30 TABLET | Refills: 0 | Status: SHIPPED | OUTPATIENT
Start: 2025-05-28 | End: 2025-06-27

## 2025-05-27 RX ORDER — ASCORBIC ACID 500 MG
500 TABLET ORAL 3 TIMES DAILY
Qty: 90 TABLET | Refills: 0 | Status: SHIPPED | OUTPATIENT
Start: 2025-05-27 | End: 2025-06-26

## 2025-05-27 RX ADMIN — POLYETHYLENE GLYCOL 3350 17 G: 17 POWDER, FOR SOLUTION ORAL at 08:34:00

## 2025-05-27 RX ADMIN — POTASSIUM CHLORIDE 40 MEQ: 1500 TABLET, EXTENDED RELEASE ORAL at 12:51:00

## 2025-05-27 RX ADMIN — METOPROLOL TARTRATE 25 MG: 25 TABLET, FILM COATED ORAL at 06:11:00

## 2025-05-27 RX ADMIN — HYDROCODONE BITARTRATE AND ACETAMINOPHEN 1 TABLET: 5; 325 TABLET ORAL at 02:08:00

## 2025-05-27 RX ADMIN — CHLORHEXIDINE GLUCONATE ORAL RINSE 15 ML: 1.2 SOLUTION DENTAL at 09:00:00

## 2025-05-27 RX ADMIN — ASCORBIC ACID 500 MG: 500 MG TABLET ORAL at 08:44:00

## 2025-05-27 RX ADMIN — FUROSEMIDE 20 MG: 10 INJECTION INTRAMUSCULAR; INTRAVENOUS at 08:44:00

## 2025-05-27 RX ADMIN — AMIODARONE HYDROCHLORIDE 200 MG: 200 TABLET ORAL at 08:45:00

## 2025-05-27 RX ADMIN — POTASSIUM CHLORIDE 40 MEQ: 1500 TABLET, EXTENDED RELEASE ORAL at 15:04:00

## 2025-05-27 RX ADMIN — ASPIRIN 81 MG: 81 TABLET ORAL at 08:44:00

## 2025-05-27 RX ADMIN — DOCUSATE SODIUM 100 MG: 100 CAPSULE, LIQUID FILLED ORAL at 08:47:00

## 2025-05-27 RX ADMIN — METOCLOPRAMIDE HYDROCHLORIDE 10 MG: 5 INJECTION INTRAMUSCULAR; INTRAVENOUS at 01:45:00

## 2025-05-27 RX ADMIN — FAMOTIDINE 20 MG: 20 TABLET, FILM COATED ORAL at 08:45:00

## 2025-05-27 RX ADMIN — CLOPIDOGREL BISULFATE 75 MG: 75 TABLET ORAL at 08:44:00

## 2025-05-27 RX ADMIN — SENNOSIDES 8.6 MG: 8.6 MG TABLET ORAL at 08:46:00

## 2025-05-27 RX ADMIN — ATORVASTATIN CALCIUM 40 MG: 40 TABLET, FILM COATED ORAL at 08:44:00

## 2025-05-27 RX ADMIN — ENOXAPARIN SODIUM 40 MG: 100 INJECTION SUBCUTANEOUS at 08:51:00

## 2025-05-27 RX ADMIN — METOCLOPRAMIDE HYDROCHLORIDE 10 MG: 5 INJECTION INTRAMUSCULAR; INTRAVENOUS at 13:01:00

## 2025-05-27 RX ADMIN — LEVOTHYROXINE SODIUM 25 MCG: 25 TABLET ORAL at 06:11:00

## 2025-05-27 RX ADMIN — METOCLOPRAMIDE HYDROCHLORIDE 10 MG: 5 INJECTION INTRAMUSCULAR; INTRAVENOUS at 06:11:00

## 2025-05-27 RX ADMIN — DOXEPIN HYDROCHLORIDE 1 TABLET: 50 CAPSULE ORAL at 08:46:00

## 2025-05-27 NOTE — TELEPHONE ENCOUNTER
Friday 6/6 - 8  CP: 9:30 am    Monday 6/9 - 17  CP: 11:30 am    Can offer the above appointments.  Please let me know what he selects.

## 2025-05-27 NOTE — PAYOR COMM NOTE
--------------  CONTINUED STAY REVIEW    Payor: [employer] OPEN ACCESS   Subscriber #:  [id number]  Authorization Number: [id number]    Admit date: 5/22/25  Admit time:  2:58 PM    REVIEW DOCUMENTATION:  PROGRESS NOTES   5/24   Date of Service: 5/24/2025 11:48 AM       [facility]  part of [facility]     Progress Note       Subjective:   Elias Shah is a(n) 59 year old male was seen and examined  No acute events overnight  Resting in chair tonight  No cp, sob, f,c,n,v, abd pain or HA     Objective:   Blood pressure 102/71, pulse 70, temperature 98.9 °F (37.2 °C), temperature source Pulmonary Artery, resp. rate 17, height 5' 11\" (1.803 m), weight 269 lb 6.4 oz (122.2 kg), SpO2 96%.     Gen: AAOx4, resting comfortably  Pulm: Lungs clear, normal respiratory effort, CT in place   CV: Heart with regular rate and rhythm  Abd: Abdomen soft, nontender, nondistended, bowel sounds present  Neuro: No acute focal deficits  MSK: moves extremities  Skin: Warm and dry  Psych: calm affect  Ext: no cyanosis     Current Inpatient Medications:   [Current Hospital Medications]    [Current Hospital Medications]     Current Facility-Administered Medications:     levothyroxine (Synthroid) tab 25 mcg, 25 mcg, Oral, Daily @ 0700    atorvastatin (Lipitor) tab 40 mg, 40 mg, Oral, Daily    insulin aspart (NovoLOG) 100 Units/mL FlexPen 1-5 Units, 1-5 Units, Subcutaneous, TID CC    furosemide (Lasix) 10 mg/mL injection 20 mg, 20 mg, Intravenous, BID (Diuretic)    sodium chloride 0.9% infusion, , Intravenous, Continuous    melatonin tab 3 mg, 3 mg, Oral, Nightly PRN    sennosides (Senokot) tab 8.6 mg, 8.6 mg, Oral, BID    docusate sodium (Colace) cap 100 mg, 100 mg, Oral, BID    polyethylene glycol (PEG 3350) (Miralax) 17 g oral packet 17 g, 17 g, Oral, Daily PRN    bisacodyl (Dulcolax) 10 MG rectal suppository 10 mg, 10 mg, Rectal, Daily PRN    metoclopramide (Reglan) 5 mg/mL injection 10 mg, 10 mg,  Intravenous, Q6H    ondansetron (Zofran) 4 MG/2ML injection 4 mg, 4 mg, Intravenous, Q6H PRN    famotidine (Pepcid) tab 20 mg, 20 mg, Oral, BID **OR** famotidine (Pepcid) 20 mg/2mL injection 20 mg, 20 mg, Intravenous, BID    dexmedeTOMIDine in sodium chloride 0.9% (Precedex) 400 mcg/100mL infusion premix, 0.2-1.5 mcg/kg/hr (Dosing Weight), Intravenous, Continuous    DOBUTamine in dextrose 5% (Dobutrex) 500 mg/250mL infusion premix, 2.5-20 mcg/kg/min (Dosing Weight), Intravenous, Continuous PRN    nitroGLYCERIN in dextrose 5% 50 mg/250mL infusion premix, 5-300 mcg/min, Intravenous, Continuous PRN    norepinephrine (Levophed) 4 mg/250mL infusion premix, 0.5-30 mcg/min, Intravenous, Continuous PRN    metoprolol tartrate (Lopressor) tab 25 mg, 25 mg, Oral, 2x Daily(Beta Blocker)    clevidipine (Cleviprex) 25 MG/50ML IV infusion, 1-6 mg/hr, Intravenous, Continuous    potassium chloride 20 mEq/100mL IVPB premix 20 mEq, 20 mEq, Intravenous, PRN **OR** potassium chloride 40 mEq/100mL IVPB premix (central line) 40 mEq, 40 mEq, Intravenous, PRN    magnesium sulfate in dextrose 5% 1 g/100mL infusion premix 1 g, 1 g, Intravenous, PRN    magnesium sulfate in sterile water for injection 2 g/50mL IVPB premix 2 g, 2 g, Intravenous, PRN    mupirocin (Bactroban) 2% nasal ointment 1 Application, 1 Application, Nasal, BID    chlorhexidine gluconate (Peridex) 0.12 % oral solution 15 mL, 15 mL, Mouth/Throat, BID    multivitamin (Tab-A-Gris/Beta Carotene) tab 1 tablet, 1 tablet, Oral, Daily    ascorbic acid (Vitamin C) tab 500 mg, 500 mg, Oral, TID    enoxaparin (Lovenox) 40 MG/0.4ML SUBQ injection 40 mg, 40 mg, Subcutaneous, Daily    acetaminophen (Tylenol) tab 650 mg, 650 mg, Oral, Q4H PRN **OR** HYDROcodone-acetaminophen (Norco) 5-325 MG per tab 1 tablet, 1 tablet, Oral, Q4H PRN **OR** HYDROcodone-acetaminophen (Norco) 5-325 MG per tab 2 tablet, 2 tablet, Oral, Q4H PRN    morphINE PF 4 MG/ML injection 2 mg, 2 mg, Intravenous, Q2H PRN  **OR** morphINE PF 4 MG/ML injection 4 mg, 4 mg, Intravenous, Q2H PRN    sodium chloride 0.9 % IV bolus 250 mL, 250 mL, Intravenous, PRN    albumin human (Albumin) 5% injection 12.5 g, 12.5 g, Intravenous, Once PRN    clopidogrel (Plavix) tab 75 mg, 75 mg, Oral, Daily    aspirin DR tab 81 mg, 81 mg, Oral, Daily    glucose (Dex4) 15 GM/59ML oral liquid 15 g, 15 g, Oral, Q15 Min PRN **OR** glucose (Glutose) 40% oral gel 15 g, 15 g, Oral, Q15 Min PRN **OR** glucose-vitamin C (Dex-4) chewable tab 4 tablet, 4 tablet, Oral, Q15 Min PRN **OR** dextrose 50% injection 50 mL, 50 mL, Intravenous, Q15 Min PRN **OR** glucose (Dex4) 15 GM/59ML oral liquid 30 g, 30 g, Oral, Q15 Min PRN **OR** glucose (Glutose) 40% oral gel 30 g, 30 g, Oral, Q15 Min PRN **OR** glucose-vitamin C (Dex-4) chewable tab 8 tablet, 8 tablet, Oral, Q15 Min PRN    ceFAZolin (Ancef) 2g in 10mL IV syringe premix, 2 g, Intravenous, Q8H    [] amiodarone in dextrose 4.14% (Cordarone) 360 mg/200mL infusion premix, 1 mg/min, Intravenous, Continuous **FOLLOWED BY** amiodarone in dextrose 4.14% (Cordarone) 360 mg/200mL infusion premix, 0.5 mg/min, Intravenous, Continuous    amiodarone (Pacerone) tab 200 mg, 200 mg, Oral, BID with meals    [Held by provider] losartan (Cozaar) tab 25 mg, 25 mg, Oral, Daily     Assessment and Plan:   Acute CAD  - s/p CABG x2  - extubated successfully, doing well  - wound care and chest tube care per CT surgery  - plan asa, plavix, statin and beta blocker     HTN  - stable, cont current meds and adjust as needed     HL  - statin     Hx of etoh abuse  - monitor ciwa     Obesity  - BMI 37  - diet and exercise when stable     Full code  DVT Ppx: lovenox  MDM: High   Results:            Recent Labs   Lab 25  1518 25  1233 25  0351   RBC 4.90 4.34 3.56*   HGB 15.4 13.9 11.3*   HCT 43.9 39.6 31.8*   MCV 89.6 91.2 89.3   MCH 31.4 32.0 31.7   MCHC 35.1 35.1 35.5   RDW 12.4 12.2 12.5   NEPRELIM  --   --  7.38   WBC 6.7  16.3* 9.9   .0 185.0 174.0                   Recent Labs   Lab 05/22/25  1518 05/23/25  1233 05/23/25  2148 05/24/25  0351   GLU 96 146*  --  127*   BUN 10 11  --  10   CREATSERUM 1.01 0.94  --  0.99   EGFRCR 86 93  --  88   CA 9.4 8.0*  --  7.8*    139  --  140   K 3.9 4.3 3.7 3.6    109  --  109   CO2 26.0 23.0  --  24.0            Imaging:   XR CHEST AP PORTABLE  (CPT=71045)  Result Date: 5/23/2025  PROCEDURE:   XR CHEST AP PORTABLE  (CPT=71045) TIME:              13 12.   COMPARISON: [facility], XR CHEST PA + LAT CHEST (CPT=71046), 5/22/2025, 12:26 PM.  INDICATIONS:     [place], ET tube, and OG tube placement.  TECHNIQUE:             Single view.   FINDINGS/IMPRESSION:    1. There is an endotracheal tube with tip approximately 4 cm above the alessandra.  There is a right IJ [place]-Nikolai catheter with tip overlying the expected position of the distal right ventricle.  There are mediastinal drains.  2. The heart mediastinal structures are enlarged.  Pulmonary vascularity is slightly increased.  There are trace bilateral pleural effusions.  The findings are suggestive of slight fluid overload.  3. Lung volumes are diminished.  There are small streaky opacities at the lung bases likely representing bibasilar atelectasis.    Dictated by (CST): Emigdio Giang MD on 5/23/2025 at 1:39 PM     Finalized by (CST): Emigdio Giang MD on 5/23/2025 at 1:41 PM           CT CHEST (CPT=71250)  Result Date: 5/22/2025  CONCLUSION:   Mild calcific atherosclerosis thoracic aorta without evidence of aneurysm.    Dictated by (CST): Julian Gaines MD on 5/22/2025 at 1:14 PM     Finalized by (CST): Julian Gaines MD on 5/22/2025 at 1:19 PM           US CAROTID DOPPLER BILAT - DIAG IMG (CPT=93880)  Result Date: 5/22/2025  CONCLUSION:  1. Mild atherosclerosis of the carotid bifurcations without hemodynamically stenosis or occlusion.  2. Antegrade flow is documented in the vertebral arteries bilaterally.    3. Lesser incidental findings as above.    Dictated by (CST): Uzair Prescott MD on 5/22/2025 at 12:43 PM     Finalized by (CST): Uzair Prescott MD on 5/22/2025 at 12:46 PM           EKG 12 Lead  Result Date: 5/24/2025  Normal sinus rhythm Nonspecific T wave abnormality Abnormal ECG When compared with ECG of 23-MAY-2025 12:51, Right bundle branch block is no longer Present Confirmed by SAAD ORTEGA PRATIK ([id number]) on 5/24/2025 10:44:19 AM     EKG 12 Lead  Result Date: 5/23/2025  Normal sinus rhythm Right bundle branch block Abnormal ECG No previous ECGs found in Muse Confirmed by RILEY XAVIER ([id number]) on 5/23/2025 4:52:20 PM           Don Alejandra MD  5/24/2025 5/25      Date of Service: 5/25/2025  8:50 AM     Signed         [facility]  part of [facility]     Progress Note       Subjective:   Elias Shah is a(n) 59 year old male was seen and examined  No acute events overnight  Resting in chair tonight  Feeling well  No cp, sob, f,c,n,v, abd pain or HA     Objective:   Blood pressure 127/76, pulse 90, temperature 97.5 °F (36.4 °C), temperature source Temporal, resp. rate 21, height 5' 11\" (1.803 m), weight 271 lb 2.7 oz (123 kg), SpO2 96%.     Gen: AAOx4, resting comfortably  Pulm: Lungs clear, normal respiratory effort, CT in place   CV: Heart with regular rate and rhythm  Abd: Abdomen soft, nontender, nondistended, bowel sounds present  Neuro: No acute focal deficits  MSK: moves extremities  Skin: Warm and dry  Psych: calm affect  Ext: no cyanosis     Current Inpatient Medications:   [Current Hospital Medications]    [Current Hospital Medications]     Current Facility-Administered Medications:     levothyroxine (Synthroid) tab 25 mcg, 25 mcg, Oral, Daily @ 0700    atorvastatin (Lipitor) tab 40 mg, 40 mg, Oral, Daily    insulin aspart (NovoLOG) 100 Units/mL FlexPen 1-5 Units, 1-5 Units, Subcutaneous, TID CC    furosemide (Lasix) 10 mg/mL injection 20 mg, 20 mg,  Intravenous, BID (Diuretic)    sodium chloride 0.9% infusion, , Intravenous, Continuous    melatonin tab 3 mg, 3 mg, Oral, Nightly PRN    sennosides (Senokot) tab 8.6 mg, 8.6 mg, Oral, BID    docusate sodium (Colace) cap 100 mg, 100 mg, Oral, BID    polyethylene glycol (PEG 3350) (Miralax) 17 g oral packet 17 g, 17 g, Oral, Daily PRN    bisacodyl (Dulcolax) 10 MG rectal suppository 10 mg, 10 mg, Rectal, Daily PRN    metoclopramide (Reglan) 5 mg/mL injection 10 mg, 10 mg, Intravenous, Q6H    ondansetron (Zofran) 4 MG/2ML injection 4 mg, 4 mg, Intravenous, Q6H PRN    famotidine (Pepcid) tab 20 mg, 20 mg, Oral, BID **OR** famotidine (Pepcid) 20 mg/2mL injection 20 mg, 20 mg, Intravenous, BID    dexmedeTOMIDine in sodium chloride 0.9% (Precedex) 400 mcg/100mL infusion premix, 0.2-1.5 mcg/kg/hr (Dosing Weight), Intravenous, Continuous    DOBUTamine in dextrose 5% (Dobutrex) 500 mg/250mL infusion premix, 2.5-20 mcg/kg/min (Dosing Weight), Intravenous, Continuous PRN    nitroGLYCERIN in dextrose 5% 50 mg/250mL infusion premix, 5-300 mcg/min, Intravenous, Continuous PRN    norepinephrine (Levophed) 4 mg/250mL infusion premix, 0.5-30 mcg/min, Intravenous, Continuous PRN    metoprolol tartrate (Lopressor) tab 25 mg, 25 mg, Oral, 2x Daily(Beta Blocker)    clevidipine (Cleviprex) 25 MG/50ML IV infusion, 1-6 mg/hr, Intravenous, Continuous    potassium chloride 20 mEq/100mL IVPB premix 20 mEq, 20 mEq, Intravenous, PRN **OR** potassium chloride 40 mEq/100mL IVPB premix (central line) 40 mEq, 40 mEq, Intravenous, PRN    magnesium sulfate in dextrose 5% 1 g/100mL infusion premix 1 g, 1 g, Intravenous, PRN    magnesium sulfate in sterile water for injection 2 g/50mL IVPB premix 2 g, 2 g, Intravenous, PRN    mupirocin (Bactroban) 2% nasal ointment 1 Application, 1 Application, Nasal, BID    chlorhexidine gluconate (Peridex) 0.12 % oral solution 15 mL, 15 mL, Mouth/Throat, BID    multivitamin (Tab-A-Gris/Beta Carotene) tab 1 tablet, 1  tablet, Oral, Daily    ascorbic acid (Vitamin C) tab 500 mg, 500 mg, Oral, TID    enoxaparin (Lovenox) 40 MG/0.4ML SUBQ injection 40 mg, 40 mg, Subcutaneous, Daily    acetaminophen (Tylenol) tab 650 mg, 650 mg, Oral, Q4H PRN **OR** HYDROcodone-acetaminophen (Norco) 5-325 MG per tab 1 tablet, 1 tablet, Oral, Q4H PRN **OR** HYDROcodone-acetaminophen (Norco) 5-325 MG per tab 2 tablet, 2 tablet, Oral, Q4H PRN    morphINE PF 4 MG/ML injection 2 mg, 2 mg, Intravenous, Q2H PRN **OR** morphINE PF 4 MG/ML injection 4 mg, 4 mg, Intravenous, Q2H PRN    clopidogrel (Plavix) tab 75 mg, 75 mg, Oral, Daily    aspirin DR tab 81 mg, 81 mg, Oral, Daily    glucose (Dex4) 15 GM/59ML oral liquid 15 g, 15 g, Oral, Q15 Min PRN **OR** glucose (Glutose) 40% oral gel 15 g, 15 g, Oral, Q15 Min PRN **OR** glucose-vitamin C (Dex-4) chewable tab 4 tablet, 4 tablet, Oral, Q15 Min PRN **OR** dextrose 50% injection 50 mL, 50 mL, Intravenous, Q15 Min PRN **OR** glucose (Dex4) 15 GM/59ML oral liquid 30 g, 30 g, Oral, Q15 Min PRN **OR** glucose (Glutose) 40% oral gel 30 g, 30 g, Oral, Q15 Min PRN **OR** glucose-vitamin C (Dex-4) chewable tab 8 tablet, 8 tablet, Oral, Q15 Min PRN    [] amiodarone in dextrose 4.14% (Cordarone) 360 mg/200mL infusion premix, 1 mg/min, Intravenous, Continuous **FOLLOWED BY** amiodarone in dextrose 4.14% (Cordarone) 360 mg/200mL infusion premix, 0.5 mg/min, Intravenous, Continuous    amiodarone (Pacerone) tab 200 mg, 200 mg, Oral, BID with meals    [Held by provider] losartan (Cozaar) tab 25 mg, 25 mg, Oral, Daily     Assessment and Plan:   Acute CAD  - s/p CABG x2  - extubated successfully, doing well  - wound care and chest tube care per CT surgery  - plan asa, plavix, statin and beta blocker     HTN  - stable, cont current meds and adjust as needed     HL  - statin     Hx of etoh abuse  - monitor ciwa     Obesity  - BMI 37  - diet and exercise when stable     Full code  DVT Ppx: lovenox  MDM: High   Results:             Recent Labs   Lab 05/23/25  1233 05/24/25  0351 05/25/25  0507   RBC 4.34 3.56* 3.49*   HGB 13.9 11.3* 10.9*   HCT 39.6 31.8* 32.4*   MCV 91.2 89.3 92.8   MCH 32.0 31.7 31.2   MCHC 35.1 35.5 33.6   RDW 12.2 12.5 12.7   NEPRELIM  --  7.38  --    WBC 16.3* 9.9 12.9*   .0 174.0 151.0                   Recent Labs   Lab 05/23/25  1233 05/23/25  2148 05/24/25  0351 05/25/25  0507   *  --  127* 106*   BUN 11  --  10 10   CREATSERUM 0.94  --  0.99 0.95   EGFRCR 93  --  88 92   CA 8.0*  --  7.8* 8.5*     --  140 137   K 4.3 3.7 3.6 3.6     --  109 103   CO2 23.0  --  24.0 26.0            Imaging:   XR CHEST AP PORTABLE  (CPT=71045)  Result Date: 5/23/2025  PROCEDURE:   XR CHEST AP PORTABLE  (CPT=71045) TIME:              13 12.   COMPARISON: [facility], XR CHEST PA + LAT CHEST (CPT=71046), 5/22/2025, 12:26 PM.  INDICATIONS:     [place], ET tube, and OG tube placement.  TECHNIQUE:             Single view.   FINDINGS/IMPRESSION:    1. There is an endotracheal tube with tip approximately 4 cm above the alessandra.  There is a right IJ [place]-Nikolai catheter with tip overlying the expected position of the distal right ventricle.  There are mediastinal drains.  2. The heart mediastinal structures are enlarged.  Pulmonary vascularity is slightly increased.  There are trace bilateral pleural effusions.  The findings are suggestive of slight fluid overload.  3. Lung volumes are diminished.  There are small streaky opacities at the lung bases likely representing bibasilar atelectasis.    Dictated by (CST): Emigdio Giang MD on 5/23/2025 at 1:39 PM     Finalized by (CST): Emigdio Giang MD on 5/23/2025 at 1:41 PM           EKG 12 Lead  Result Date: 5/24/2025  Normal sinus rhythm Nonspecific T wave abnormality Abnormal ECG When compared with ECG of 23-MAY-2025 12:51, Right bundle branch block is no longer Present Confirmed by SAAD ORTEGA, SUE ([id number]) on 5/24/2025 10:44:19 AM     EKG 12  Lead  Result Date: 5/23/2025  Normal sinus rhythm Right bundle branch block Abnormal ECG No previous ECGs found in Muse Confirmed by RILEY XAVIER ([id number]) on 5/23/2025 4:52:20 PM           Don Alejandra MD  5/25/2025 5/26     Date of Service: 5/26/2025 11:01 AM     Signed         [facility]  part of [facility]     Progress Note       Subjective:   Elias Shah is a(n) 59 year old male was seen and examined  No acute events overnight  Resting in chair  Wife at bedside   Feeling well  No cp, sob, f,c,n,v, abd pain or HA     Objective:   Blood pressure 125/73, pulse 96, temperature 98 °F (36.7 °C), resp. rate (!) 27, height 5' 11\" (1.803 m), weight 263 lb 0.1 oz (119.3 kg), SpO2 93%.     Gen: AAOx4, resting comfortably  Pulm: Lungs clear, normal respiratory effort, CT in place   CV: Heart with regular rate and rhythm  Abd: Abdomen soft, nontender, nondistended, bowel sounds present  Neuro: No acute focal deficits  MSK: moves extremities  Skin: Warm and dry  Psych: calm affect  Ext: no cyanosis     Current Inpatient Medications:   [Current Hospital Medications]    [Current Hospital Medications]     Current Facility-Administered Medications:     enoxaparin (Lovenox) 40 MG/0.4ML SUBQ injection 40 mg, 40 mg, Subcutaneous, Daily    levothyroxine (Synthroid) tab 25 mcg, 25 mcg, Oral, Daily @ 0700    atorvastatin (Lipitor) tab 40 mg, 40 mg, Oral, Daily    furosemide (Lasix) 10 mg/mL injection 20 mg, 20 mg, Intravenous, BID (Diuretic)    sodium chloride 0.9% infusion, , Intravenous, Continuous    melatonin tab 3 mg, 3 mg, Oral, Nightly PRN    sennosides (Senokot) tab 8.6 mg, 8.6 mg, Oral, BID    docusate sodium (Colace) cap 100 mg, 100 mg, Oral, BID    polyethylene glycol (PEG 3350) (Miralax) 17 g oral packet 17 g, 17 g, Oral, Daily PRN    bisacodyl (Dulcolax) 10 MG rectal suppository 10 mg, 10 mg, Rectal, Daily PRN    metoclopramide (Reglan) 5 mg/mL injection 10 mg, 10  mg, Intravenous, Q6H    ondansetron (Zofran) 4 MG/2ML injection 4 mg, 4 mg, Intravenous, Q6H PRN    famotidine (Pepcid) tab 20 mg, 20 mg, Oral, BID **OR** famotidine (Pepcid) 20 mg/2mL injection 20 mg, 20 mg, Intravenous, BID    dexmedeTOMIDine in sodium chloride 0.9% (Precedex) 400 mcg/100mL infusion premix, 0.2-1.5 mcg/kg/hr (Dosing Weight), Intravenous, Continuous    DOBUTamine in dextrose 5% (Dobutrex) 500 mg/250mL infusion premix, 2.5-20 mcg/kg/min (Dosing Weight), Intravenous, Continuous PRN    nitroGLYCERIN in dextrose 5% 50 mg/250mL infusion premix, 5-300 mcg/min, Intravenous, Continuous PRN    norepinephrine (Levophed) 4 mg/250mL infusion premix, 0.5-30 mcg/min, Intravenous, Continuous PRN    metoprolol tartrate (Lopressor) tab 25 mg, 25 mg, Oral, 2x Daily(Beta Blocker)    clevidipine (Cleviprex) 25 MG/50ML IV infusion, 1-6 mg/hr, Intravenous, Continuous    potassium chloride 20 mEq/100mL IVPB premix 20 mEq, 20 mEq, Intravenous, PRN **OR** potassium chloride 40 mEq/100mL IVPB premix (central line) 40 mEq, 40 mEq, Intravenous, PRN    magnesium sulfate in dextrose 5% 1 g/100mL infusion premix 1 g, 1 g, Intravenous, PRN    magnesium sulfate in sterile water for injection 2 g/50mL IVPB premix 2 g, 2 g, Intravenous, PRN    mupirocin (Bactroban) 2% nasal ointment 1 Application, 1 Application, Nasal, BID    chlorhexidine gluconate (Peridex) 0.12 % oral solution 15 mL, 15 mL, Mouth/Throat, BID    multivitamin (Tab-A-Gris/Beta Carotene) tab 1 tablet, 1 tablet, Oral, Daily    ascorbic acid (Vitamin C) tab 500 mg, 500 mg, Oral, TID    acetaminophen (Tylenol) tab 650 mg, 650 mg, Oral, Q4H PRN **OR** HYDROcodone-acetaminophen (Norco) 5-325 MG per tab 1 tablet, 1 tablet, Oral, Q4H PRN **OR** HYDROcodone-acetaminophen (Norco) 5-325 MG per tab 2 tablet, 2 tablet, Oral, Q4H PRN    morphINE PF 4 MG/ML injection 2 mg, 2 mg, Intravenous, Q2H PRN **OR** morphINE PF 4 MG/ML injection 4 mg, 4 mg, Intravenous, Q2H PRN     clopidogrel (Plavix) tab 75 mg, 75 mg, Oral, Daily    aspirin DR tab 81 mg, 81 mg, Oral, Daily    glucose (Dex4) 15 GM/59ML oral liquid 15 g, 15 g, Oral, Q15 Min PRN **OR** glucose (Glutose) 40% oral gel 15 g, 15 g, Oral, Q15 Min PRN **OR** glucose-vitamin C (Dex-4) chewable tab 4 tablet, 4 tablet, Oral, Q15 Min PRN **OR** dextrose 50% injection 50 mL, 50 mL, Intravenous, Q15 Min PRN **OR** glucose (Dex4) 15 GM/59ML oral liquid 30 g, 30 g, Oral, Q15 Min PRN **OR** glucose (Glutose) 40% oral gel 30 g, 30 g, Oral, Q15 Min PRN **OR** glucose-vitamin C (Dex-4) chewable tab 8 tablet, 8 tablet, Oral, Q15 Min PRN    [] amiodarone in dextrose 4.14% (Cordarone) 360 mg/200mL infusion premix, 1 mg/min, Intravenous, Continuous **FOLLOWED BY** amiodarone in dextrose 4.14% (Cordarone) 360 mg/200mL infusion premix, 0.5 mg/min, Intravenous, Continuous    amiodarone (Pacerone) tab 200 mg, 200 mg, Oral, BID with meals    [Held by provider] losartan (Cozaar) tab 25 mg, 25 mg, Oral, Daily     Assessment and Plan:   Acute CAD  - s/p CABG x2  - extubated successfully, doing well  - CT and hernandez removed   - plan asa, plavix, statin and beta blocker     HTN  - stable, cont current meds and adjust as needed     HL  - statin     Hx of etoh abuse  - monitor ciwa     Obesity  - BMI 37  - diet and exercise when stable     Full code  DVT Ppx: lovenox  MDM: High   Results:            Recent Labs   Lab 25  1233 25  0351 25  0507   RBC 4.34 3.56* 3.49*   HGB 13.9 11.3* 10.9*   HCT 39.6 31.8* 32.4*   MCV 91.2 89.3 92.8   MCH 32.0 31.7 31.2   MCHC 35.1 35.5 33.6   RDW 12.2 12.5 12.7   NEPRELIM  --  7.38  --    WBC 16.3* 9.9 12.9*   .0 174.0 151.0                   Recent Labs   Lab 25  1233 25  2148 25  0351 25  0507   *  --  127* 106*   BUN 11  --  10 10   CREATSERUM 0.94  --  0.99 0.95   EGFRCR 93  --  88 92   CA 8.0*  --  7.8* 8.5*     --  140 137   K 4.3 3.7 3.6 3.6     --   109 103   CO2 23.0  --  24.0 26.0            Imaging:   XR CHEST PA + LAT CHEST (MFO=99192)  Result Date: 5/26/2025  CONCLUSION:  1. Postoperative changes from a recent CABG.  Kanorado-Nikolai catheter and left basilar chest tube have been removed.  No pneumothorax. 2. Stable small bilateral pleural effusions suggesting mild CHF or fluid overload. 3. Unchanged mild passive atelectasis at the left lung base.    Dictated by (CST): Andrew Bueno MD on 5/26/2025 at 7:45 AM     Finalized by (CST): Andrew Bueno MD on 5/26/2025 at 7:47 AM                        Don Alejandra MD  5/26/2025                  MEDS  Scheduled Meds Sorted by Name   Medications 05/24/25 05/25/25 05/26/25   acetaminophen (Ofirmev) 10 mg/mL infusion premix 1,000 mg  Dose: 1,000 mg  Freq: Every 8 hours Route: IV  Last Dose: 1,000 mg (05/24/25 0410)  Start: 05/23/25 1245 End: 05/24/25 0425    0410 MR-New Bag                                amiodarone (Pacerone) tab 200 mg  Dose: 200 mg  Freq: 2 times daily with meals Route: OR  Start: 05/24/25 0800   Admin Instructions:   May give simultaneously with IV amiodarone.    0905 LC-Given   1900 LC-Given       0853 NE-Given   1644 NE-Given       0809 SC-Given   1711 SC-Given                     ascorbic acid (Vitamin C) tab 500 mg  Dose: 500 mg  Freq: 3 times daily Route: OR  Start: 05/24/25 0900    (0900 LC)-Not Given   1643 LC-Given   2049 DK-Given      0855 NE-Given   1644 NE-Given   2125 MR-Given      0809 SC-Given   1615 SC-Given   2000 DK-Given              aspirin DR tab 81 mg  Dose: 81 mg  Freq: Daily Route: OR  Start: 05/24/25 0930   Admin Instructions:   Do not crush    0905 LC-Given        0854 NE-Given        0809 SC-Given                atorvastatin (Lipitor) tab 40 mg  Dose: 40 mg  Freq: Daily Route: OR  Start: 05/24/25 1130    1434 LC-Given        0854 NE-Given        0809 SC-Given                ceFAZolin (Ancef) 2g in 10mL IV syringe premix  Dose: 2 g  Freq: Every 8 hours Route:  IV  Last Dose: 2 g (05/25/25 0040)  Start: 05/23/25 1730 End: 05/25/25 0045   Admin Instructions:   To be given 8 hours after pre-op/intra-op dose. Pharmacist to adjust for total of 48 hours post-op.   Order specific questions:       0153 MR-New Bag   0913 LC-New Bag   1858 LC-New Bag      0040 DK-New Bag                             chlorhexidine gluconate (Peridex) 0.12 % oral solution 15 mL  Dose: 15 mL  Freq: 2 times daily Route: MT  Start: 05/23/25 1245   Admin Instructions:   Swish [facility] Spit    0903 LC-Given   2050 DK-Given       0855 KY-Given   2126 MR-Given       0809 SC-Given   2000 DK-Given         clopidogrel (Plavix) tab 75 mg  Dose: 75 mg  Freq: Daily Route: OR  Start: 05/24/25 0930    0905 LC-Given        0854 KY-Given        0809 SC-Given          docusate sodium (Colace) cap 100 mg  Dose: 100 mg  Freq: 2 times daily Route: OR  Start: 05/24/25 0900   Admin Instructions:   Do not crush    0900 LC-Given   2049 DK-Given       0855 KY-Given   2100 MR-Given       0809 SC-Given   2000 DK-Given         enoxaparin (Lovenox) 40 MG/0.4ML SUBQ injection 40 mg  Dose: 40 mg  Freq: Daily Route: SC  Start: 05/26/25 1000   Admin Instructions:   Only administer Enoxaparin subcutaneously into the abdomen unless directed otherwise by a physician. Alternate injection sites between the left and right anterolateral and left and right posterolateral abdominal wall.      1000 SC-Given          enoxaparin (Lovenox) 40 MG/0.4ML SUBQ injection 40 mg  Dose: 40 mg  Freq: Daily Route: SC  Start: 05/24/25 0930 End: 05/26/25 0959   Admin Instructions:   Only administer Enoxaparin subcutaneously into the abdomen unless directed otherwise by a physician. Alternate injection sites between the left and right anterolateral and left and right posterolateral abdominal wall.    0913 LC-Given        0855 KY-Given        (0930 SC)-Not Given   0959-D/C'd        famotidine (Pepcid) tab 20 mg  Dose: 20 mg  Freq: 2 times daily Route: OR  Start:  05/23/25 1245       2049 DK-Given       0855 DC-Given   2125 MR-Given       0809 SC-Given   2000 DK-Given         Or   famotidine (Pepcid) 20 mg/2mL injection 20 mg  Dose: 20 mg  Freq: 2 times daily Route: IV  Start: 05/23/25 1245    0910 LC-Given                                fentaNYL (Sublimaze) 50 mcg/mL injection  Start: 05/22/25 0920 End: 05/22/25 0920   Admin Instructions:   Meagan Corrales: cabinet override         furosemide (Lasix) 10 mg/mL injection 20 mg  Dose: 20 mg  Freq: 2 times daily (diuretic) Route: IV  Start: 05/24/25 1130    1434 LC-Given        0855 DC-Given   1644 DC-Given       0809 SC-Given   1711 SC-Given                                                     levothyroxine (Synthroid) tab 25 mcg  Dose: 25 mcg  Freq: Daily at 0700 Route: OR  Start: 05/24/25 0830   Admin Instructions:   Give on an empty stomach. Hold tube feedings 1 hour before AND after.    0903 LC-Given        0653 DK-Given        0528 MR-Given                      losartan (Cozaar) tab 25 mg  Dose: 25 mg  Freq: Daily Route: OR  Start: 05/22/25 1015    0930 AW        (0930 DC)-Not Given        0930          metoclopramide (Reglan) 5 mg/mL injection 10 mg  Dose: 10 mg  Freq: Every 6 hours Route: IV  Start: 05/23/25 1245    0058 MR-Given   0645 MR-Given   1438 LC-Given     2050 DK-Given        0040 DK-Given   0654 DK-Given   1445 DC-Given     1804 DC-Given        0019 MR-Given   0549 MR-Given   1251 SC-Given     1810 SC-Given          metoprolol tartrate (Lopressor) tab 25 mg  Dose: 25 mg  Freq: 2 times daily(Beta Blocker) Route: OR  Start: 05/24/25 0600   Admin Instructions:   Hold if patient on dobutamine  Hold if BP<100 or HR<60  For CABG only    0600 MR-Hold [C]   0917 LC-Given [C]   1900 LC-Given      0653 DK-Given   1644 DC-Given       0528 MR-Given   1810 SC-Given                     multivitamin (Tab-A-Gris/Beta Carotene) tab 1 tablet  Dose: 1 tablet  Freq: Daily Route: OR  Start: 05/24/25 0930    5414 LC-Given         0854 FL-Given        0809 SC-Given          mupirocin (Bactroban) 2% nasal ointment 1 Application  Dose: 1 Application  Freq: 2 times daily Route: NA  Start: 05/23/25 2100 End: 05/28/25 0859   Admin Instructions:   Each nare    0909 LC-Given   2049 DK-Given       0855 FL-Given   2127 MR-Given       0809 SC-Given   2000 DK-Given                           sennosides (Senokot) tab 8.6 mg  Dose: 8.6 mg  Freq: 2 times daily Route: OR  Start: 05/24/25 0900    1435 LC-Given   2049 DK-Given       0854 FL-Given   2126 MR-Given         0809 SC-Given   2000 DK-Given          Continuous Meds Sorted by Name     Medications 05/24/25 05/25/25 05/26/25          Followed by   amiodarone in dextrose 4.14% (Cordarone) 360 mg/200mL infusion premix  Rate: 16.7 mL/hr Dose: 0.5 mg/min  Freq: Continuous Route: IV  Last Dose: Stopped (05/24/25 0700)  Start: 05/23/25 2130 End: 05/27/25 1035   Admin Instructions:   use 0.22 micron filter and change filter with every bag    0356 MR-New Bag   0600 MR-Rate/Dose Verify   0700 MR-Stopped     0930 LC-Stopped                        dextrose 5%-sodium chloride 0.45% infusion  Rate:  mL/hr Dose:  mL/hr  Freq: Continuous Route: IV  Last Dose: Stopped (05/24/25 1145)  Start: 05/23/25 1245 End: 05/24/25 1117   Admin Instructions:   Rate for all IV fluids = 100ml/hr for first 8 hours  After 8 hours, total rate for all IV fluids = 60 ml/hr    0000 MR-Rate/Dose Verify   0400 MR-Rate/Dose Verify   0600 MR-Rate/Dose Verify     0700 MR-Rate/Dose Verify   0924 LC-Rate/Dose Change   1117-D/C'd  1145 LC-Stopped          DOBUTamine in dextrose 5% (Dobutrex) 500 mg/250mL infusion premix  Rate: 9-71.7 mL/hr Dose: 2.5-20 mcg/kg/min  Weight Dosing Info: 119.5 kg (Dosing Weight)  Freq: Continuous PRN Route: IV  PRN Comment: Cardiac Index (CI) < 2 and Systolic BP < 90  Last Dose: Stopped (05/24/25 0300)  Start: 05/23/25 1233   Admin Instructions:   Continue if started in the OR; notify provider if goals are  not met.   Order specific questions:       0000 MR-Rate/Dose Verify   0300 MR-Stopped           insulin regular human (Novolin R, Humulin R) 100 Units in sodium chloride 0.9% 100 mL standard infusion (100 mL)  Rate: 1-28 mL/hr Dose: 1-28 Units/hr  Freq: Continuous Route: IV  Last Dose: Stopped (05/24/25 1145)  Start: 05/23/25 1245 End: 05/24/25 1117   Admin Instructions:   Use hyperlink to access IV Insulin Algorithm chart for titration.  Start in ordered Algorithm. Titrate insulin drip according to blood glucose level. Advance to next algorithm per grid instructions.   Order specific questions:       0000 MR-Rate/Dose Verify   0100 MR-Rate/Dose Verify   0200 MR-Rate/Dose Verify     0300 MR-Rate/Dose Change   0400 MR-Stopped   0428 MR-Rate/Dose Change     0500 MR-Rate/Dose Change   0600 MR-Rate/Dose Verify   0700 MR-Rate/Dose Verify     0740 MR/LC-Handoff   1117-D/C'd  1145 LC-Stopped                 norepinephrine (Levophed) 4 mg/250mL infusion premix  Rate: 1.9-112.5 mL/hr Dose: 0.5-30 mcg/min  Freq: Continuous PRN Route: IV  PRN Comment: Systolic BP less than 90 mmHg  Last Dose: Stopped (05/24/25 0755)  Start: 05/23/25 1233   Admin Instructions:   Continue if started in the OR; notify provider if goals are not met.   Order specific questions:       0000 MR-Rate/Dose Verify   0410 MR-Rate/Dose Verify   0445 MR-Rate/Dose Change     0600 MR-Rate/Dose Verify   0700 MR-Rate/Dose Verify   0730 LC-Rate/Dose Change     0755 LC-Stopped               PRN Meds Sorted by Name     Medications 05/24/25 05/25/25 05/26/25     Or   HYDROcodone-acetaminophen (Norco) 5-325 MG per tab 1 tablet  Dose: 1 tablet  Freq: Every 4 hours PRN Route: OR  PRN Reason: moderate pain  PRN Comment: pain  Start: 05/23/25 1233   Admin Instructions:   Use PRN reason as a guide and follow range order policy                             1644 VA-Given     2126 MR-Given                  Or   HYDROcodone-acetaminophen (Norco) 5-325 MG per tab 2  tablet  Dose: 2 tablet  Freq: Every 4 hours PRN Route: OR  PRN Reason: severe pain  Start: 05/23/25 1233   Admin Instructions:   Use PRN reason as a guide and follow range order policy    0646 MR-Given   1042 LC-Given   1437 LC-Given     1859 LC-Given        0048 DK-Given   0854 WI-Given                0528 MR-Given                                                                               DOBUTamine in dextrose 5% (Dobutrex) 500 mg/250mL infusion premix  Rate: 9-71.7 mL/hr Dose: 2.5-20 mcg/kg/min  Weight Dosing Info: 119.5 kg (Dosing Weight)  Freq: Continuous PRN Route: IV  PRN Comment: Cardiac Index (CI) < 2 and Systolic BP < 90  Last Dose: Stopped (05/24/25 0300)  Start: 05/23/25 1233   Admin Instructions:   Continue if started in the OR; notify provider if goals are not met.   Order specific questions:       0000 MR-Rate/Dose Verify   0300 MR-Stopped                                                                                               Or   morphINE PF 4 MG/ML injection 4 mg  Dose: 4 mg  Freq: Every 2 hour PRN Route: IV  PRN Reason: moderate pain  Start: 05/23/25 1233   Admin Instructions:   Use PRN reason as a guide and follow range order policy. If oral pain meds are ordered and patient can tolerate oral intake, start with PRN oral pain medications first. If severe pain, call provider.    0202 MR-Given                        norepinephrine (Levophed) 4 mg/250mL infusion premix  Rate: 1.9-112.5 mL/hr Dose: 0.5-30 mcg/min  Freq: Continuous PRN Route: IV  PRN Comment: Systolic BP less than 90 mmHg  Last Dose: Stopped (05/24/25 0755)  Start: 05/23/25 1233   Admin Instructions:   Continue if started in the OR; notify provider if goals are not met.   Order specific questions:       0000 MR-Rate/Dose Verify   0410 MR-Rate/Dose Verify   0445 MR-Rate/Dose Change     0600 MR-Rate/Dose Verify   0700 MR-Rate/Dose Verify   0730 LC-Rate/Dose Change     0755 LC-Stopped                         potassium chloride 20  mEq/100mL IVPB premix 20 mEq  Dose: 20 mEq  Freq: As needed Route: IV  PRN Comment: .  Last Dose: 20 mEq (05/25/25 1124)  Start: 05/23/25 1233   Admin Instructions:   Administer for K+ level between 3.5 and 4    0441 MR-New Bag        1124 IL-New Bag           Or                                       VS   Vitals (since admission)    Date/Time Temp Pulse Resp BP SpO2 Weight O2 Device O2 Flow Rate (L/min) [facility]   05/27/25 0600 -- -- -- -- -- 263 lb 0.1 oz (119.3 kg) -- --    05/27/25 0500 -- 82 22 140/76 94 % -- None (Room air) --    05/27/25 0400 98.2 °F (36.8 °C) 87 25 126/85 96 % -- None (Room air) --    05/27/25 0300 -- 84 21 125/76 94 % -- -- --    05/27/25 0200 -- 88 26 130/87 94 % -- None (Room air) --    05/27/25 0100 -- 90 26 137/82 94 % -- -- --    05/27/25 0000 98.2 °F (36.8 °C) 88 25 135/71 95 % -- None (Room air) --    05/26/25 2300 -- 86 21 139/87 92 % -- None (Room air) --    05/26/25 2200 -- 89 26 136/74 98 % -- None (Room air) --    05/26/25 2100 -- 88 23 161/74 Abnormal  90 % -- None (Room air) --    05/26/25 2000 97.6 °F (36.4 °C) 89 22 125/77 94 % -- None (Room air) --    05/26/25 1900 -- 85 22 119/84 94 % -- -- --    05/26/25 1800 -- 107 20 134/83 95 % -- None (Room air) -- SC   05/26/25 1600 97.4 °F (36.3 °C) 99 15 142/92 Abnormal  97 % -- None (Room air) -- SC   05/26/25 1400 -- 92 27 Abnormal  113/90 92 % -- None (Room air) -- SC   05/26/25 1200 97.5 °F (36.4 °C) -- -- -- -- -- None (Room air) -- SC   05/26/25 1038 -- 95 -- 122/83 -- -- -- --    05/26/25 0900 -- 96 27 Abnormal  125/73 93 % -- None (Room air) -- SC   05/26/25 0800 97.1 °F (36.2 °C) 89 21 112/82 95 % -- None (Room air) -- SC   05/26/25 0731 -- 90 16 127/94 Abnormal  95 % -- None (Room air) --    05/26/25 0600 -- 97 27 Abnormal  -- 92 % -- None (Room air) --    05/26/25 0552 -- 104 20 -- 93 % 263 lb 0.1 oz (119.3 kg) -- --    05/26/25 0500 -- 98 19 -- 92 % -- -- --    05/26/25 0400 98 °F (36.7 °C)  95 23 125/70 92 % -- None (Room air) --    05/26/25 0300 -- 100 27 Abnormal  142/79 94 % -- -- --    05/26/25 0200 -- 93 24 -- 95 % -- None (Room air) --    05/26/25 0100 -- 89 25 142/82 95 % -- -- --    05/26/25 0000 97.6 °F (36.4 °C) 94 19 136/69 96 % -- None (Room air) --    05/25/25 2300 -- 92 24 -- 94 % -- None (Room air) --    05/25/25 2200 -- 95 17 120/85 94 % -- None (Room air) --    05/25/25 2100 -- 95 18 112/84 95 % -- None (Room air) --    05/25/25 2000 97.8 °F (36.6 °C) 92 23 131/73 96 % -- None (Room air) --    05/25/25 1900 -- 92 22 117/88 94 % -- None (Room air) --    05/25/25 1800 -- 86 20 134/68 95 % -- -- -- [place]   05/25/25 1700 -- 102 16 137/90 96 % -- -- -- [place]   05/25/25 1600 98.5 °F (36.9 °C) 102 20 140/90 96 % -- -- -- [place]   05/25/25 1500 -- 104 21 122/92 Abnormal  95 % -- -- -- [place]   05/25/25 1400 -- 95 20 139/80 95 % -- -- -- [place]   05/25/25 1300 -- 97 19 141/126 Abnormal  95 % -- None (Room air) -- [place]   05/25/25 1200 97.5 °F (36.4 °C) 92 22 128/81 92 % -- None (Room air) -- [place]   05/25/25 1100 -- 97 25 137/86 92 % -- Nasal cannula 2 L/min [place]   05/25/25 1000 -- 87 20 120/76 93 % -- Nasal cannula 2 L/min [place]   05/25/25 0900 -- 87 25 127/90 97 % -- Nasal cannula 2 L/min [place]   05/25/25 0800 98 °F (36.7 °C) 84 25 116/77 96 % -- Nasal cannula 2 L/min [place]   05/25/25 0500 -- 90 21 127/76 96 % 271 lb 2.7 oz (123 kg) Nasal cannula 2 L/min DK   05/25/25 0400 97.5 °F (36.4 °C) 87 18 121/74 92 % -- Nasal cannula 2 L/min DK   05/25/25 0300 -- 86 17 126/81 93 % -- Nasal cannula 2 L/min DK   05/25/25 0200 -- 72 18 118/72 90 % -- Nasal cannula 2 L/min DK   05/25/25 0100 -- 94 24 130/84 92 % -- Nasal cannula 2 L/min DK   05/25/25 0000 97.4 °F (36.3 °C) 76 21 117/74 93 % -- None (Room air) -- DK   05/24/25 2300 -- 80 20 107/80 90 % -- None (Room air) -- DK   05/24/25 2200 -- 82 19 116/80 94 % -- None (Room air) -- DK   05/24/25 2100 -- 83 25 124/76 92 % -- None (Room air) -- DK 05/24/25 2000 97.3  °F (36.3 °C) 98 20 109/81 88 % Abnormal  -- None (Room air) -- DK   05/24/25 1900 -- 99 27 Abnormal  121/87 92 % -- Nasal cannula 2 L/min    05/24/25 1800 -- 87 25 116/72 91 % -- None (Room air) --    05/24/25 1700 -- 88 23 118/73 90 % -- None (Room air) --    05/24/25 1600 99.2 °F (37.3 °C) 87 23 112/72 91 % -- None (Room air) --    05/24/25 1500 -- 92 25 118/73 94 % -- None (Room air) --    05/24/25 1449 -- -- -- -- 94 % -- None (Room air) --    05/24/25 1400 -- 81 20 116/76 98 % -- Nasal cannula 2 L/min    05/24/25 1300 -- 74 20 110/74 95 % -- Nasal cannula 2 L/min    05/24/25 1200 99 °F (37.2 °C) 71 21 104/75 94 % -- Nasal cannula 2 L/min    05/24/25 1100 -- 70 17 102/71 96 % -- Nasal cannula 4 L/min    05/24/25 1000 -- 80 24 110/79 93 % -- Nasal cannula 4 L/min    05/24/25 0928 -- 84 28 Abnormal  117/75 94 % -- Nasal cannula 4 L/min    05/24/25 0900 98.9 °F (37.2 °C) 85 21 127/89 93 % -- Nasal cannula 5 L/min    05/24/25 0800 98.9 °F (37.2 °C) 83 26 118/78 93 % -- Nasal cannula 5 L/min    05/24/25 0700 99.3 °F (37.4 °C) 83 27 Abnormal  123/80 94 % -- Nasal cannula 5 L/min    05/24/25 0600 99.4 °F (37.4 °C) 84 27 Abnormal  123/83 95 % 269 lb 6.4 oz (122.2 kg) Nasal cannula 5 L/min    05/24/25 0530 -- 80 24 118/76 97 % -- Nasal cannula 5 L/min    05/24/25 0500 99.3 °F (37.4 °C) 78 24 111/66 97 % -- Nasal cannula 5 L/min    05/24/25 0400 99.2 °F (37.3 °C) 73 24 111/66 100 % -- Nasal cannula 5 L/min    05/24/25 0300 98.9 °F (37.2 °C) 75 24 108/71 98 % -- Nasal cannula 5 L/min    05/24/25 0200 98.8 °F (37.1 °C) 71 22 98/70 98 % -- Nasal cannula 5 L/min    05/24/25 0100 98.9 °F (37.2 °C) 69 23 106/68 97 % -- Nasal cannula 5 L/min    05/24/25 0000 99.2 °F (37.3 °C) 70 22 92/64 96 % -- Nasal cannula 5 L/min

## 2025-05-27 NOTE — PROGRESS NOTES
[facility]  part of [facility]    Progress Note    Elias Shah Patient Status:  Inpatient    1965 MRN [id number]   Location [facility] 2W/SW Attending Don Alejandra MD   Hosp Day # 5 PCP Bobby Swift MD       Subjective:   Patient seen and examined this morning. Wife at bedside  Doing well. Denies CP, SOB. Walked in the banuelos already this am  Feeling ready to go home  PW removed this am    Objective:   Blood pressure 140/76, pulse 82, temperature 98.2 °F (36.8 °C), temperature source Temporal, resp. rate 22, height 5' 11\" (1.803 m), weight 263 lb 0.1 oz (119.3 kg), SpO2 94%.    Scheduled Meds: Scheduled Medications[1]    Current Medications:  Current Hospital Medications[2]  Prescriptions Prior to Admission[3]    General appearance: alert, appears stated age, and cooperative  Head: Normocephalic, without obvious abnormality, atraumatic  Pulmonary:  clear to auscultation bilaterally  Cardiovascular: S1, S2 normal, no murmur, click, rub or gallop, regular rate and rhythm  Abdominal: soft, non-tender; bowel sounds normal  Extremities: normal, atraumatic, no cyanosis. Minimal LE edema bilaterally  INCISIONS: sternal incisions c/d/I, no errythema or drainage. CT sites scabbed  LLE incision c/d/I, no erythema or drainage      Results:     Lab Results   Component Value Date    WBC 11.2 (H) 2025    HGB 11.2 (L) 2025    HCT 31.9 (L) 2025    .0 2025    CREATSERUM 0.94 2025    BUN 15 2025     (L) 2025    K 3.3 (L) 2025    CL 98 2025    CO2 25.0 2025     (H) 2025    CA 9.0 2025    ALB 4.4 2025    ALKPHO 103 2025    BILT 1.0 2025    TP 6.8 2025    AST 25 2025    ALT 58 (H) 2025    PTT 25.8 2025    INR 1.25 (H) 2025    T4F 1.1 2024    TSH 3.082 2025    PSA 1.3 2018    ESRML 19 2025    CRP <0.29 2023    MG 2.4  05/27/2025       XR CHEST PA + LAT CHEST (UAH=[id number])  Result Date: 5/26/2025  CONCLUSION:  1. Postoperative changes from a recent CABG.  [place] catheter and left basilar chest tube have been removed.  No pneumothorax. 2. Stable small bilateral pleural effusions suggesting mild CHF or fluid overload. 3. Unchanged mild passive atelectasis at the left lung base.    Dictated by (CST): Andrew Bueno MD on 5/26/2025 at 7:45 AM     Finalized by (CST): Andrew Bueno MD on 5/26/2025 at 7:47 AM              Assessment and Plan:   POD# 4 S/p CABGx2 on 5/23  HD stable  CXR reviewed, stable. No PTX, small bilateral pleural effusions. Will discharge on PO lasix  Expected postop volume overload - lasix 20 mg IV bidcurrently, will discharge on lasix 20 mg PO BID with Kcl BID x 2 weeks; cardiology to reevaluate diuretic need.   Pw removed today, ok to shower  Dysrrythemias in OR, started on amio for afib prevention. No afib or further dysrrhythmias postop, no need to continue at discharge  Hx of HTN - on lopressor 25 mg bid, mgmt per cardiology  Continue on aspirin and plavix for 3 months, then aspirin there after  Hx of high cholesterol - continue atorvastatin 40 mg daily  Expected Anemia 2/2 to acute blood loss from surgery - hgb stable , 11.2 today. Will continue vitamin C x 1 month at discharge  Expected postop acute phase leukocytosis: afebrile, wbc downtrending 11.2 < 12.9, s/p perioperative antibiotics  Cardiac diet , pepcid 20 mg bid   Hx hypothyroidism - continue synthroid 25 mcg daily  Encourage IS/ambulation, PT/OT  Discharge planning: expecting Home with [facility] today, follow ups scheduled. Will see me in the office on 6/11 at 230 pm    Plan of care discussed with patient/wife/RN and CV surgery Dr. Robbins.    VENICE Jones  [phone]  5/27/2025         [1]    enoxaparin  40 mg Subcutaneous Daily    levothyroxine  25 mcg Oral Daily @ 0700    atorvastatin  40 mg Oral Daily    furosemide  20 mg Intravenous BID  (Diuretic)    sennosides  8.6 mg Oral BID    docusate sodium  100 mg Oral BID    metoclopramide  10 mg Intravenous Q6H    famotidine  20 mg Oral BID    Or    famotidine  20 mg Intravenous BID    metoprolol tartrate  25 mg Oral 2x Daily(Beta Blocker)    mupirocin  1 Application Nasal BID    chlorhexidine gluconate  15 mL Mouth/Throat BID    multivitamin  1 tablet Oral Daily    ascorbic acid  500 mg Oral TID    clopidogrel  75 mg Oral Daily    aspirin  81 mg Oral Daily    amiodarone  200 mg Oral BID with meals    [Held by provider] losartan  25 mg Oral Daily   [2]   Current Facility-Administered Medications   Medication Dose Route Frequency    enoxaparin (Lovenox) 40 MG/0.4ML SUBQ injection 40 mg  40 mg Subcutaneous Daily    levothyroxine (Synthroid) tab 25 mcg  25 mcg Oral Daily @ 0700    atorvastatin (Lipitor) tab 40 mg  40 mg Oral Daily    furosemide (Lasix) 10 mg/mL injection 20 mg  20 mg Intravenous BID (Diuretic)    sodium chloride 0.9% infusion   Intravenous Continuous    melatonin tab 3 mg  3 mg Oral Nightly PRN    sennosides (Senokot) tab 8.6 mg  8.6 mg Oral BID    docusate sodium (Colace) cap 100 mg  100 mg Oral BID    polyethylene glycol (PEG 3350) (Miralax) 17 g oral packet 17 g  17 g Oral Daily PRN    bisacodyl (Dulcolax) 10 MG rectal suppository 10 mg  10 mg Rectal Daily PRN    metoclopramide (Reglan) 5 mg/mL injection 10 mg  10 mg Intravenous Q6H    ondansetron (Zofran) 4 MG/2ML injection 4 mg  4 mg Intravenous Q6H PRN    famotidine (Pepcid) tab 20 mg  20 mg Oral BID    Or    famotidine (Pepcid) 20 mg/2mL injection 20 mg  20 mg Intravenous BID    dexmedeTOMIDine in sodium chloride 0.9% (Precedex) 400 mcg/100mL infusion premix  0.2-1.5 mcg/kg/hr (Dosing Weight) Intravenous Continuous    DOBUTamine in dextrose 5% (Dobutrex) 500 mg/250mL infusion premix  2.5-20 mcg/kg/min (Dosing Weight) Intravenous Continuous PRN    nitroGLYCERIN in dextrose 5% 50 mg/250mL infusion premix  5-300 mcg/min Intravenous  Continuous PRN    norepinephrine (Levophed) 4 mg/250mL infusion premix  0.5-30 mcg/min Intravenous Continuous PRN    metoprolol tartrate (Lopressor) tab 25 mg  25 mg Oral 2x Daily(Beta Blocker)    potassium chloride 20 mEq/100mL IVPB premix 20 mEq  20 mEq Intravenous PRN    Or    potassium chloride 40 mEq/100mL IVPB premix (central line) 40 mEq  40 mEq Intravenous PRN    magnesium sulfate in dextrose 5% 1 g/100mL infusion premix 1 g  1 g Intravenous PRN    magnesium sulfate in sterile water for injection 2 g/50mL IVPB premix 2 g  2 g Intravenous PRN    mupirocin (Bactroban) 2% nasal ointment 1 Application  1 Application Nasal BID    chlorhexidine gluconate (Peridex) 0.12 % oral solution 15 mL  15 mL Mouth/Throat BID    multivitamin (Tab-A-Gris/Beta Carotene) tab 1 tablet  1 tablet Oral Daily    ascorbic acid (Vitamin C) tab 500 mg  500 mg Oral TID    acetaminophen (Tylenol) tab 650 mg  650 mg Oral Q4H PRN    Or    HYDROcodone-acetaminophen (Norco) 5-325 MG per tab 1 tablet  1 tablet Oral Q4H PRN    Or    HYDROcodone-acetaminophen (Norco) 5-325 MG per tab 2 tablet  2 tablet Oral Q4H PRN    morphINE PF 4 MG/ML injection 2 mg  2 mg Intravenous Q2H PRN    Or    morphINE PF 4 MG/ML injection 4 mg  4 mg Intravenous Q2H PRN    clopidogrel (Plavix) tab 75 mg  75 mg Oral Daily    aspirin DR tab 81 mg  81 mg Oral Daily    glucose (Dex4) 15 GM/59ML oral liquid 15 g  15 g Oral Q15 Min PRN    Or    glucose (Glutose) 40% oral gel 15 g  15 g Oral Q15 Min PRN    Or    glucose-vitamin C (Dex-4) chewable tab 4 tablet  4 tablet Oral Q15 Min PRN    Or    dextrose 50% injection 50 mL  50 mL Intravenous Q15 Min PRN    Or    glucose (Dex4) 15 GM/59ML oral liquid 30 g  30 g Oral Q15 Min PRN    Or    glucose (Glutose) 40% oral gel 30 g  30 g Oral Q15 Min PRN    Or    glucose-vitamin C (Dex-4) chewable tab 8 tablet  8 tablet Oral Q15 Min PRN    amiodarone in dextrose 4.14% (Cordarone) 360 mg/200mL infusion premix  0.5 mg/min Intravenous  Continuous    amiodarone (Pacerone) tab 200 mg  200 mg Oral BID with meals    [Held by provider] losartan (Cozaar) tab 25 mg  25 mg Oral Daily   [3]   Medications Prior to Admission   Medication Sig    DICLOFENAC 50 MG Oral Tab EC TAKE 1 TABLET BY MOUTH 3 TIMES DAILY AS NEEDED.    levothyroxine 25 MCG Oral Tab Take 1 tablet (25 mcg total) by mouth before breakfast.    atorvastatin 40 MG Oral Tab TAKE 1 TABLET BY MOUTH EVERY DAY    aspirin (ASPIR-LOW) 81 MG Oral Tab EC Take 1 tablet (81 mg total) by mouth daily.    Losartan Potassium 25 MG Oral Tab Take 1 tablet (25 mg total) by mouth daily.    cyclobenzaprine 5 MG Oral Tab Take 1 tablet (5 mg total) by mouth 3 (three) times daily as needed for Muscle spasms.    albuterol 108 (90 Base) MCG/ACT Inhalation Aero Soln Inhale 2 puffs into the lungs every 6 (six) hours as needed.    fluocinolone 0.01 % External Cream 1 thin application to the affected areas twice a day as needed.    triamcinolone 0.1 % External Cream Apply topically 2 (two) times daily as needed.

## 2025-05-27 NOTE — CM/SW NOTE
Plan for discharge to home today. Notified [facility] liaison. Confirmed plan w/ Heath DALE.    SAL Grant [id number]

## 2025-05-27 NOTE — PLAN OF CARE
No acute events overnight. Patient ambulating and voiding well.  PRN norco x1 for pain.  Pacer wires remain capped and in place.         Problem: CARDIOVASCULAR - ADULT  Goal: Maintains optimal cardiac output and hemodynamic stability  Description: INTERVENTIONS:  - Monitor vital signs, rhythm, and trends  - Monitor for bleeding, hypotension and signs of decreased cardiac output  - Evaluate effectiveness of vasoactive medications to optimize hemodynamic stability  - Monitor arterial and/or venous puncture sites for bleeding and/or hematoma  - Assess quality of pulses, skin color and temperature  - Assess for signs of decreased coronary artery perfusion - ex. Angina  - Evaluate fluid balance, assess for edema, trend weights  Outcome: Progressing  Goal: Absence of cardiac arrhythmias or at baseline  Description: INTERVENTIONS:  - Continuous cardiac monitoring, monitor vital signs, obtain 12 lead EKG if indicated  - Evaluate effectiveness of antiarrhythmic and heart rate control medications as ordered  - Initiate emergency measures for life threatening arrhythmias  - Monitor electrolytes and administer replacement therapy as ordered  Outcome: Progressing     Problem: RESPIRATORY - ADULT  Goal: Achieves optimal ventilation and oxygenation  Description: INTERVENTIONS:  - Assess for changes in respiratory status  - Assess for changes in mentation and behavior  - Position to facilitate oxygenation and minimize respiratory effort  - Oxygen supplementation based on oxygen saturation or ABGs  - Provide Smoking Cessation handout, if applicable  - Encourage broncho-pulmonary hygiene including cough, deep breathe, Incentive Spirometry  - Assess the need for suctioning and perform as needed  - Assess and instruct to report SOB or any respiratory difficulty  - Respiratory Therapy support as indicated  - Manage/alleviate anxiety  - Monitor for signs/symptoms of CO2 retention  Outcome: Progressing     Problem: PAIN - ADULT  Goal:  Verbalizes/displays adequate comfort level or patient's stated pain goal  Description: INTERVENTIONS:  - Encourage pt to monitor pain and request assistance  - Assess pain using appropriate pain scale  - Administer analgesics based on type and severity of pain and evaluate response  - Implement non-pharmacological measures as appropriate and evaluate response  - Consider cultural and social influences on pain and pain management  - Manage/alleviate anxiety  - Utilize distraction and/or relaxation techniques  - Monitor for opioid side effects  - Notify MD/LIP if interventions unsuccessful or patient reports new pain  - Anticipate increased pain with activity and pre-medicate as appropriate  Outcome: Progressing

## 2025-05-27 NOTE — PHYSICAL THERAPY NOTE
Chart reviewed for PT treatment, RN Noah approves. Patient and wife present in room and have questions regarding discharge mobility and how sternal precautions impact this. Discussed and demonstrated lifting up to a gallon of mild and how to keep it close, sit to/from stand using scoot forward as well as mild forward trunk lean to control speed of standing or sitting/avoiding plopping and not using arms to prevent. He feels confident he can get up/down from toilet, wife to get RTS as well.  Discussed activity level as well as importance of rest with good understanding. All questions answered and needs in reach, pt feels ready for dc home and has no concerns, wife will be there to assist. Plans for a shower this afternoon and then dc home. RN aware of session, pt remains in chair.         Goals to be met by: 5/30/25  Patient Goal Patient's self-stated goal is: go home   Goal #1 Patient is able to demonstrate supine - sit EOB @ level: modified independent      Goal #1   Current Status  needing min A. Good technique   Goal #2 Patient is able to demonstrate transfers Sit to/from Stand at assistance level: modified independent with none      Goal #2  Current Status  supervision to CGA, wife able to assist   Goal #3 Patient is able to ambulate 400 feet with assist device: none at assistance level: modified independent   Goal #3   Current Status  MET with RN staff   Goal #4 Patient will negotiate 1 stairs/one curb w/ assistive device and supervision   Goal #4   Current Status  NT, educated pt to step up with strong leg, then light use of hands on door frame   Goal #5 Patient to demonstrate independence with home activity/exercise instructions provided to patient in preparation for discharge.   Goal #5   Current Status  in progress- discussion about activity level and gradual progression   Goal #6     Goal #6  Current Status               Therapeutic exs: 10 min

## 2025-05-27 NOTE — DISCHARGE SUMMARY
[facility]  part of [facility]    Discharge Summary    Elias Shah Patient Status:  Inpatient    1965 MRN [id number]   Location [facility] 2W/SW Attending Don Alejandra MD   Hosp Day # 5 PCP Bobby Swift MD     Date of Admission: 2025 Disposition: Home or Self Care     Date of Discharge: 25    Admitting Diagnosis: Abnormal cardiac CT angiography [R93.1]    Hospital Discharge Diagnoses: CAD s/p CABG x 2, HTN, HL. Obesity    Lace+ Score: 25  59-90 High Risk  29-58 Medium Risk  0-28   Low Risk.    TCM Follow-Up Recommendation:  LACE < 29: Low Risk of readmission after discharge from the hospital; Still recommend for TCM follow-up.    Problem List: Problem List[1]    Reason for Admission: elective CABG    Physical Exam:   Vitals:    25 0500   BP: 140/76   Pulse: 82   Resp: 22   Temp:      Gen: AAOx4, resting comfortably  Pulm: Lungs clear, normal respiratory effort  CV: Heart with regular rate and rhythm  Abd: Abdomen soft, nontender, nondistended, bowel sounds present  Neuro: No acute focal deficits  MSK: moves extremities  Skin: Warm and dry  Psych: calm affect  Ext: no cyanosis    History of Present Illness:   Per Dr. Mark  Elias Shah is a(n) 59 year old male with a PMH of HTN, HL, JAMES who underwent ct coronary angio which showed moderate to severe disease of the mid LAD with abnormal CT FFR and significant disease of the diagonal.  He was seen by cardiology in the office who recommended coronary cath.  Yesterday he had cardiac cath and it showed severe LAD disease and consult with CT surgery was recommended.  Patient was deemed a good candidate for CABG and he went to the OR this morning.  I am seeing him post op from CABG x2.  He is currently intubated and resting comfortably.     Hospital Course:   Acute CAD  - s/p CABG x2  - extubated successfully, doing well  - CT and hernandez removed   - plan asa, plavix, statin and beta blocker  -  stable for dc     HTN  - stable, hold ARB on dc  - started on lasix     HL  - statin     Hx of etoh abuse  - was on CIWA  - counseled on cessation      Obesity  - BMI 37  - diet and exercise when stable    Consultations: cardiology, CV surgery, Pulm    Procedures: see above    Complications: none    Discharge Condition: Stable    Discharge Medications:      Discharge Medications        START taking these medications        Instructions Prescription details   ascorbic acid 500 MG Tabs  Commonly known as: Vitamin C      Take 1 tablet (500 mg total) by mouth 3 (three) times daily.   Stop taking on: June 26, 2025  Quantity: 90 tablet  Refills: 0     clopidogrel 75 MG Tabs  Commonly known as: Plavix  Start taking on: May 28, 2025      Take 1 tablet (75 mg total) by mouth daily.   Stop taking on: August 26, 2025  Quantity: 90 tablet  Refills: 0     famotidine 20 MG Tabs  Commonly known as: Pepcid      Take 1 tablet (20 mg total) by mouth 2 (two) times daily.   Stop taking on: June 26, 2025  Quantity: 60 tablet  Refills: 0     furosemide 20 MG Tabs  Commonly known as: Lasix      Take 1 tablet (20 mg total) by mouth 2 (two) times daily for 14 days.   Stop taking on: Anabela 10, 2025  Quantity: 28 tablet  Refills: 0     HYDROcodone-acetaminophen 5-325 MG Tabs  Commonly known as: Norco      Take 1 tablet by mouth every 4 (four) hours as needed.   Quantity: 20 tablet  Refills: 0     multivitamin Tabs  Start taking on: May 28, 2025      Take 1 tablet by mouth daily.   Stop taking on: June 27, 2025  Quantity: 30 tablet  Refills: 0     potassium chloride 10 MEQ Tbcr  Commonly known as: Klor-Con M10      Take 1 tablet (10 mEq total) by mouth 2 (two) times daily for 14 days.   Stop taking on: Anabela 10, 2025  Quantity: 28 tablet  Refills: 0            CONTINUE taking these medications        Instructions Prescription details   albuterol 108 (90 Base) MCG/ACT Aers  Commonly known as: Ventolin HFA      Inhale 2 puffs into the lungs every  6 (six) hours as needed.   Quantity: 1 each  Refills: 1     aspirin 81 MG Tbec  Commonly known as: Aspir-Low      Take 1 tablet (81 mg total) by mouth daily.   Quantity: 30 tablet  Refills: 11     atorvastatin 40 MG Tabs  Commonly known as: Lipitor      TAKE 1 TABLET BY MOUTH EVERY DAY   Quantity: 90 tablet  Refills: 3     cyclobenzaprine 5 MG Tabs  Commonly known as: Flexeril      Take 1 tablet (5 mg total) by mouth 3 (three) times daily as needed for Muscle spasms.   Quantity: 30 tablet  Refills: 0     fluocinolone 0.01 % Crea  Commonly known as: Synalar      1 thin application to the affected areas twice a day as needed.   Quantity: 60 g  Refills: 0     levothyroxine 25 MCG Tabs  Commonly known as: Synthroid      Take 1 tablet (25 mcg total) by mouth before breakfast.   Quantity: 90 tablet  Refills: 3     triamcinolone 0.1 % Crea  Commonly known as: Kenalog      Apply topically 2 (two) times daily as needed.   Quantity: 60 g  Refills: 3            STOP taking these medications      diclofenac 50 MG Tbec  Commonly known as: Voltaren        losartan 25 MG Tabs  Commonly known as: Cozaar                  Where to Get Your Medications        These medications were sent to [facility]/pharmacy #[id number] - [place], [place] - [address] AT [facility], [phone], [phone]  [address], [place] [address]      Phone: [phone]   ascorbic acid 500 MG Tabs  clopidogrel 75 MG Tabs  famotidine 20 MG Tabs  furosemide 20 MG Tabs  HYDROcodone-acetaminophen 5-325 MG Tabs  multivitamin Tabs  potassium chloride 10 MEQ Tbcr         Greater than 35 minutes spent, >50% spent counseling re: treatment plan and workup     Don Alejandra MD  5/27/2025  a       [1]   Patient Active Problem List  Diagnosis    Benign tumor of parotid gland    Cervical radiculopathy    Hyperlipidemia LDL goal <100    Hip pain    Right inguinal pain    Elevated blood pressure reading    Agatston coronary artery calcium score greater  than 400    HTN (hypertension), benign    Abnormal cardiac CT angiography

## 2025-05-27 NOTE — PROGRESS NOTES
[facility]  part of [facility]    Progress Note    Elias Shah Patient Status:  Inpatient    1965 MRN [id number]   Location [facility] 2W/SW Attending Don Alejandra MD   Hosp Day # 5 PCP Bobby Swift MD         Subjective:   Subjective:  Up to the chair and comfortable on room air  Minimal cough with no colored sputum  No dyspnea  No abdominal pain or chest pain with no fever  Objective:   Blood pressure 140/76, pulse 82, temperature 98.2 °F (36.8 °C), temperature source Temporal, resp. rate 22, height 5' 11\" (1.803 m), weight 263 lb 0.1 oz (119.3 kg), SpO2 94%.  Physical Exam  Vitals and nursing note reviewed.   Constitutional:       General: He is not in acute distress.  HENT:      Head: Atraumatic.      Mouth/Throat:      Mouth: Mucous membranes are moist.   Eyes:      General: No scleral icterus.  Cardiovascular:      Rate and Rhythm: Normal rate.      Heart sounds:      No gallop.   Pulmonary:      Effort: No respiratory distress.      Breath sounds: No stridor. No wheezing, rhonchi or rales.   Chest:      Chest wall: No tenderness.   Abdominal:      General: Abdomen is flat. Bowel sounds are normal.      Palpations: Abdomen is soft.   Musculoskeletal:      Comments: Trace lower extremity pitting edema   Lymphadenopathy:      Cervical: No cervical adenopathy.   Neurological:      General: No focal deficit present.      Mental Status: He is oriented to person, place, and time.         Results:   Lab Results   Component Value Date    WBC 12.9 (H) 2025    HGB 10.9 (L) 2025    HCT 32.4 (L) 2025    .0 2025    CREATSERUM 0.95 2025    BUN 10 2025     2025    K 3.6 2025     2025    CO2 26.0 2025     (H) 2025    CA 8.5 (L) 2025    ALB 4.4 2025    ALKPHO 103 2025    BILT 1.0 2025    TP 6.8 2025    AST 25 2025    ALT 58 (H) 2025    PTT  25.8 05/23/2025    INR 1.25 (H) 05/23/2025    T4F 1.1 08/02/2024    TSH 3.082 05/23/2025    PSA 1.3 12/19/2018    ESRML 19 05/25/2025    CRP <0.29 06/27/2023    MG 2.2 05/24/2025    TROPHS 8 03/22/2022       XR CHEST PA + LAT CHEST (SBS=98090)  Result Date: 5/26/2025  CONCLUSION:  1. Postoperative changes from a recent CABG.  [place]-Nikolai catheter and left basilar chest tube have been removed.  No pneumothorax. 2. Stable small bilateral pleural effusions suggesting mild CHF or fluid overload. 3. Unchanged mild passive atelectasis at the left lung base.    Dictated by (CST): Andrew Bueno MD on 5/26/2025 at 7:45 AM     Finalized by (CST): Andrew Bueno MD on 5/26/2025 at 7:47 AM                Assessment & Plan:     1- CAD s/p CABG x 2 on 5/23/25   Doing well overall and chest tubes were removed  On room air with normal lung exam  Aspirin, statin, Plavix, beta-blocker, amiodarone  Pulmonary status now stable  Hemodynamically stable  Pain control  Incentive spirometry    2-DVT prophylaxis  Lovenox  subcu                  Joe Dorman MD  5/27/2025

## 2025-05-27 NOTE — PROGRESS NOTES
[facility]  Cardiology Progress Note    Elias Shah Patient Status:  Inpatient    1965 MRN [id number]   Location [facility] 2W/SW Attending Don Alejandra MD   Hosp Day # 5 PCP Bobby Swift MD     Subjective     Feels well today, no cardiac complaints.    Objective:     Patient Vitals for the past 24 hrs:   BP Temp Temp src Pulse Resp SpO2 Weight   25 0600 -- -- -- -- -- -- 263 lb 0.1 oz (119.3 kg)   25 0500 140/76 -- -- 82 22 94 % --   25 0400 126/85 98.2 °F (36.8 °C) Temporal 87 25 96 % --   25 0300 125/76 -- -- 84 21 94 % --   25 0200 130/87 -- -- 88 26 94 % --   25 0100 137/82 -- -- 90 26 94 % --   25 0000 135/71 98.2 °F (36.8 °C) Temporal 88 25 95 % --   25 2300 139/87 -- -- 86 21 92 % --   25 2200 136/74 -- -- 89 26 98 % --   25 2100 (!) 161/74 -- -- 88 23 90 % --   25 2000 125/77 97.6 °F (36.4 °C) Temporal 89 22 94 % --   25 1900 119/84 -- -- 85 22 94 % --   25 1800 134/83 -- -- 107 20 95 % --   25 1600 (!) 142/92 97.4 °F (36.3 °C) Temporal 99 15 97 % --   25 1400 113/90 -- -- 92 (!) 27 92 % --   25 1200 -- 97.5 °F (36.4 °C) Pulmonary Ar -- -- -- --   25 1038 122/83 -- -- 95 -- -- --       Intake/Output:   Last 3 shifts:   Intake/Output                   25 0700 - 25 0659 25 0700 - 25 0659 25 07 - 25 0659       Intake    P.O.  400  --  --    P.O. 400 -- --    IV PIGGYBACK  100  --  --    Volume (mL) (potassium chloride 20 mEq/100mL IVPB premix 20 mEq) 100 -- --    Total Intake 500 -- --       Output    Urine  3450  975  --    Urine -- 975 --    Intermittent/Straight Cath (mL) 1700 -- --    Output (mL) ([REMOVED] Urethral Catheter Latex;Double-lumen) 1750 -- --    Chest Tube  30  --  --    Output (mL) ([REMOVED] Y Chest Tube 1 and 2 1 Anterior Mediastinal 32 Fr. 2 Anterior Pleural 32 Fr.) 30 -- --    Total Output 3480 975  --       Net I/O     -2980 -975 --             Vent Settings:      Hemodynamic parameters (last 24 hours):      Scheduled Meds: Scheduled Medications[1]    Continuous Infusions: Medication Infusions[2]    Results:     Lab Results   Component Value Date    WBC 12.9 (H) 05/25/2025    HGB 10.9 (L) 05/25/2025    HCT 32.4 (L) 05/25/2025    .0 05/25/2025    CREATSERUM 0.95 05/25/2025    BUN 10 05/25/2025     05/25/2025    K 3.6 05/25/2025     05/25/2025    CO2 26.0 05/25/2025     (H) 05/25/2025    CA 8.5 (L) 05/25/2025    ALB 4.4 05/22/2025    ALKPHO 103 05/22/2025    BILT 1.0 05/22/2025    TP 6.8 05/22/2025    AST 25 05/22/2025    ALT 58 (H) 05/22/2025    PTT 25.8 05/23/2025    INR 1.25 (H) 05/23/2025    T4F 1.1 08/02/2024    TSH 3.082 05/23/2025    PSA 1.3 12/19/2018    ESRML 19 05/25/2025    CRP <0.29 06/27/2023    MG 2.2 05/24/2025       Recent Labs   Lab 05/23/25  1233 05/23/25  2148 05/24/25  0351 05/25/25  0507   *  --  127* 106*   BUN 11  --  10 10   CREATSERUM 0.94  --  0.99 0.95   CA 8.0*  --  7.8* 8.5*     --  140 137   K 4.3 3.7 3.6 3.6     --  109 103   CO2 23.0  --  24.0 26.0     Recent Labs   Lab 05/23/25  1233 05/24/25  0351 05/25/25  0507   RBC 4.34 3.56* 3.49*   HGB 13.9 11.3* 10.9*   HCT 39.6 31.8* 32.4*   MCV 91.2 89.3 92.8   MCH 32.0 31.7 31.2   MCHC 35.1 35.5 33.6   RDW 12.2 12.5 12.7   NEPRELIM  --  7.38  --    WBC 16.3* 9.9 12.9*   .0 174.0 151.0       No results for input(s): \"BNPML\" in the last 168 hours.    No results for input(s): \"TROP\", \"CK\" in the last 168 hours.    XR CHEST PA + LAT CHEST (WZT=[id number])  Result Date: 5/26/2025  CONCLUSION:  1. Postoperative changes from a recent CABG.  [place]-Nikolai catheter and left basilar chest tube have been removed.  No pneumothorax. 2. Stable small bilateral pleural effusions suggesting mild CHF or fluid overload. 3. Unchanged mild passive atelectasis at the left lung base.    Dictated by (CST): Ximena  MD Andrew on 5/26/2025 at 7:45 AM     Finalized by (CST): Andrew Bueno MD on 5/26/2025 at 7:47 AM                     Exam:     General: Alert and oriented x 3. No apparent distress.   HEENT: Normocephalic, anicteric sclera, neck supple, no thyromegaly or adenopathy.  Neck: No JVD, carotids 2+, no bruits.  Cardiac: Regular rate and rhythm. S1, S2 normal. No murmur, pericardial rub, S3, or extra cardiac sounds.  Lungs: Clear without wheezes, rales, rhonchi or dullness.  Normal excursions and effort.  Abdomen: Soft, non-tender. No organosplenomegally, mass or rebound, BS-present.  Extremities: Without clubbing or cyanosis. No edema.    Neurologic: Alert and oriented, normal affect. No focal defects  Skin: Warm and dry.     Assessment and Plan:   Assessment:  Postop day #4 for CABG x 2 with LIMA to LAD and SVG to OM  Normal LVEF  No arrhythmias overnight     Plan:  Continue amiodarone protocol  Beta-blockers, statin and aspirin Plavix.  Postop volume overload started on Lasix 20 mg IV twice daily with great response continue  Further management per CT surgery    Discharge home today.  Has outpatient follow-up with Dr. Marion.    Nawaf Basurto MD  [facility]  5/27/2025         [1]    enoxaparin  40 mg Subcutaneous Daily    levothyroxine  25 mcg Oral Daily @ 0700    atorvastatin  40 mg Oral Daily    furosemide  20 mg Intravenous BID (Diuretic)    sennosides  8.6 mg Oral BID    docusate sodium  100 mg Oral BID    metoclopramide  10 mg Intravenous Q6H    famotidine  20 mg Oral BID    Or    famotidine  20 mg Intravenous BID    metoprolol tartrate  25 mg Oral 2x Daily(Beta Blocker)    mupirocin  1 Application Nasal BID    chlorhexidine gluconate  15 mL Mouth/Throat BID    multivitamin  1 tablet Oral Daily    ascorbic acid  500 mg Oral TID    clopidogrel  75 mg Oral Daily    aspirin  81 mg Oral Daily    amiodarone  200 mg Oral BID with meals    [Held by provider] losartan  25 mg Oral Daily    [2]    sodium chloride 10 mL/hr at 05/23/25 1326    dexmedetomidine Stopped (05/23/25 1700)    DOBUTamine Stopped (05/24/25 0300)    nitroGLYCERIN in dextrose 5%      norepinephrine Stopped (05/24/25 0755)    amiodarone Stopped (05/24/25 0700)

## 2025-05-28 ENCOUNTER — PATIENT OUTREACH (OUTPATIENT)
Dept: CASE MANAGEMENT | Age: 60
End: 2025-05-28

## 2025-05-28 LAB
ISTAT ACTIVATED CLOTTING TIME: 452 SECONDS (ref 125–137)
ISTAT ACTIVATED CLOTTING TIME: 464 SECONDS (ref 125–137)
ISTAT ACTIVATED CLOTTING TIME: 608 SECONDS (ref 125–137)
ISTAT ACTIVATED CLOTTING TIME: <125 SECONDS (ref 125–137)
ISTAT ACTIVATED CLOTTING TIME: <125 SECONDS (ref 125–137)
ISTAT ACTIVATED CLOTTING TIME: >675 SECONDS (ref 125–137)

## 2025-05-28 RX ORDER — AMIODARONE HYDROCHLORIDE 200 MG/1
TABLET ORAL
Qty: 44 TABLET | Refills: 0 | Status: SHIPPED | OUTPATIENT
Start: 2025-05-28 | End: 2025-07-04

## 2025-05-28 RX ORDER — METOPROLOL SUCCINATE 50 MG/1
50 TABLET, EXTENDED RELEASE ORAL DAILY
Qty: 30 TABLET | Refills: 0 | Status: SHIPPED | OUTPATIENT
Start: 2025-05-28

## 2025-05-28 NOTE — PLAN OF CARE
Pt to continue metoprolol succinate 50mg daily and Amiodarone 200mg BID for 7 days then 200 daily. Medications sent to his pharmacy and called and notified pt.

## 2025-05-28 NOTE — PAYOR COMM NOTE
--------------  DISCHARGE REVIEW    Payor: TAYO OPEN ACCESS   Subscriber #:  [id number]  Authorization Number: [id number]    Admit date: 25  Admit time:   2:58 PM  Discharge Date: 2025  3:57 PM     Admitting Physician: Cesar Marion MD  Attending Physician:  No att. providers found  Primary Care Physician: Bobby Swift MD  Discharge Plan     Discharged: 2025 1557   Discharging provider: Don Alejandra MD   Discharge disposition: Home or Self Care           Discharge Summary Notes        Discharge Summary signed by Don Alejandra MD at 2025  2:38 PM       Author: Don Alejandra MD Specialty: HOSPITALIST, Internal Medicine Author Type: Physician    Filed: 2025  2:38 PM Date of Service: 2025  2:34 PM Status: Signed    : Don Alejandra MD (Physician)               [facility]  part of [facility]    Discharge Summary    Elias Shah Patient Status:  Inpatient    1965 MRN [id number]   Location [facility] 2W/SW Attending Don Alejandra MD    Day # 5 PCP Bobby Swift MD     Date of Admission: 2025 Disposition: Home or Self Care     Date of Discharge: 25    Admitting Diagnosis: Abnormal cardiac CT angiography [R93.1]    Hospital Discharge Diagnoses: CAD s/p CABG x 2, HTN, HL. Obesity    Lace+ Score: 25  59-90 High Risk  29-58 Medium Risk  0-28   Low Risk.    TCM Follow-Up Recommendation:  LACE < 29: Low Risk of readmission after discharge from the hospital; Still recommend for TCM follow-up.    Problem List: Problem List[1]    Reason for Admission: elective CABG    Physical Exam:   Vitals:    25 0500   BP: 140/76   Pulse: 82   Resp: 22   Temp:      Gen: AAOx4, resting comfortably  Pulm: Lungs clear, normal respiratory effort  CV: Heart with regular rate and rhythm  Abd: Abdomen soft, nontender, nondistended, bowel sounds present  Neuro: No acute focal deficits  MSK: moves  extremities  Skin: Warm and dry  Psych: calm affect  Ext: no cyanosis    History of Present Illness:   Per Dr. Jas Griffin Tristan Shah is a(n) 59 year old male with a PMH of HTN, HL, JAMES who underwent ct coronary angio which showed moderate to severe disease of the mid LAD with abnormal CT FFR and significant disease of the diagonal.  He was seen by cardiology in the office who recommended coronary cath.  Yesterday he had cardiac cath and it showed severe LAD disease and consult with CT surgery was recommended.  Patient was deemed a good candidate for CABG and he went to the OR this morning.  I am seeing him post op from CABG x2.  He is currently intubated and resting comfortably.     Hospital Course:   Acute CAD  - s/p CABG x2  - extubated successfully, doing well  - CT and hernandez removed   - plan asa, plavix, statin and beta blocker  - stable for dc     HTN  - stable, hold ARB on dc  - started on lasix     HL  - statin     Hx of etoh abuse  - was on CIWA  - counseled on cessation      Obesity  - BMI 37  - diet and exercise when stable    Consultations: cardiology, CV surgery, Pulm    Procedures: see above    Complications: none    Discharge Condition: Stable    Discharge Medications:      Discharge Medications        START taking these medications        Instructions Prescription details   ascorbic acid 500 MG Tabs  Commonly known as: Vitamin C      Take 1 tablet (500 mg total) by mouth 3 (three) times daily.   Stop taking on: June 26, 2025  Quantity: 90 tablet  Refills: 0     clopidogrel 75 MG Tabs  Commonly known as: Plavix  Start taking on: May 28, 2025      Take 1 tablet (75 mg total) by mouth daily.   Stop taking on: August 26, 2025  Quantity: 90 tablet  Refills: 0     famotidine 20 MG Tabs  Commonly known as: Pepcid      Take 1 tablet (20 mg total) by mouth 2 (two) times daily.   Stop taking on: June 26, 2025  Quantity: 60 tablet  Refills: 0     furosemide 20 MG Tabs  Commonly known as: Lasix      Take  1 tablet (20 mg total) by mouth 2 (two) times daily for 14 days.   Stop taking on: Anabela 10, 2025  Quantity: 28 tablet  Refills: 0     HYDROcodone-acetaminophen 5-325 MG Tabs  Commonly known as: Norco      Take 1 tablet by mouth every 4 (four) hours as needed.   Quantity: 20 tablet  Refills: 0     multivitamin Tabs  Start taking on: May 28, 2025      Take 1 tablet by mouth daily.   Stop taking on: June 27, 2025  Quantity: 30 tablet  Refills: 0     potassium chloride 10 MEQ Tbcr  Commonly known as: Klor-Con M10      Take 1 tablet (10 mEq total) by mouth 2 (two) times daily for 14 days.   Stop taking on: Anabela 10, 2025  Quantity: 28 tablet  Refills: 0            CONTINUE taking these medications        Instructions Prescription details   albuterol 108 (90 Base) MCG/ACT Aers  Commonly known as: Ventolin HFA      Inhale 2 puffs into the lungs every 6 (six) hours as needed.   Quantity: 1 each  Refills: 1     aspirin 81 MG Tbec  Commonly known as: Aspir-Low      Take 1 tablet (81 mg total) by mouth daily.   Quantity: 30 tablet  Refills: 11     atorvastatin 40 MG Tabs  Commonly known as: Lipitor      TAKE 1 TABLET BY MOUTH EVERY DAY   Quantity: 90 tablet  Refills: 3     cyclobenzaprine 5 MG Tabs  Commonly known as: Flexeril      Take 1 tablet (5 mg total) by mouth 3 (three) times daily as needed for Muscle spasms.   Quantity: 30 tablet  Refills: 0     fluocinolone 0.01 % Crea  Commonly known as: Synalar      1 thin application to the affected areas twice a day as needed.   Quantity: 60 g  Refills: 0     levothyroxine 25 MCG Tabs  Commonly known as: Synthroid      Take 1 tablet (25 mcg total) by mouth before breakfast.   Quantity: 90 tablet  Refills: 3     triamcinolone 0.1 % Crea  Commonly known as: Kenalog      Apply topically 2 (two) times daily as needed.   Quantity: 60 g  Refills: 3            STOP taking these medications      diclofenac 50 MG Tbec  Commonly known as: Voltaren        losartan 25 MG Tabs  Commonly known  as: Cozaar                  Where to Get Your Medications        These medications were sent to [id number] - [place], [place] - [address] AT [facility], [phone], [phone]  [address], [place] [address]      Phone: [phone]   ascorbic acid 500 MG Tabs  clopidogrel 75 MG Tabs  famotidine 20 MG Tabs  furosemide 20 MG Tabs  HYDROcodone-acetaminophen 5-325 MG Tabs  multivitamin Tabs  potassium chloride 10 MEQ Tbcr         Greater than 35 minutes spent, >50% spent counseling re: treatment plan and workup     Don Alejandra MD  5/27/2025  a      Electronically signed by Don Alejandra MD on 5/27/2025  2:38 PM         REVIEWER COMMENTS         [1]   Patient Active Problem List  Diagnosis    Benign tumor of parotid gland    Cervical radiculopathy    Hyperlipidemia LDL goal <100    Hip pain    Right inguinal pain    Elevated blood pressure reading    Agatston coronary artery calcium score greater than 400    HTN (hypertension), benign    Abnormal cardiac CT angiography

## 2025-05-28 NOTE — PROGRESS NOTES
Transitional Care Management   Discharge Date: 25  Contact Date: 2025    Assessment:  (Insert appropriate Smartphrase below after completing flowsheet)  TCM Initial Assessment    General:  Assessment completed with: Patient  Patient Subjective: The patient states he is feeling okay.  Chief Complaint: CORONARY ARTERY BYPASS GRAFT x2; UTILIZING THE LEFT INTERNAL MAMMARY ARTERY TO THE LAD; SVG TO THE OM; LEFT LEG ENDOSCOPIC GREATER SAPHENOUS VEIN GRAFT HARVEST; INSERTION OF TEMPORARY VENTRICULAR PACING WIRE;  Verify patient name and  with patient/ caregiver: Yes    Hospital Stay/Discharge:  Tell me what you understand of why you were in the hospital or emergency department: Bypass surgery  Prior to leaving the hospital were your Discharge Instructions reviewed with you?: Yes  Did you receive a copy of your written Discharge Instructions?: Yes  What questions do you have about your Discharge Instructions?: None  Do you feel better or worse since you left the hospital or emergency department?: Better    Follow - Up Appointment:  Do you have a follow-up appointment?: Yes  Date: 25  Physician: Dr. Swift  Are there any barriers to getting to your follow-up appointment?: Yes    Home Health/DME:  Prior to leaving the hospital was Home Health (HH) arranged for you?: Yes  Has HH been out or set up a visit to see you?: Visit Scheduled  Date: 25     Prior to leaving the hospital or emergency department was Durable Medical Equipment (DME), medical supplies, or infusions arranged for you?: No  Are DME/medical supply/infusions needs identified by staff during this assessment?: No     Medications/Diet:       Were you given a different diet per your Discharge Instructions?: No     Questions/Concerns:  Do you have any questions or concerns that have not been discussed?: No       Follow-up Appointments:  Your appointments       Date & Time Appointment Department (Center)    2025 4:00 PM T Sleep [facility]  Home Sleep Apnea with [facility] SLEEP ROOMS [facility] [place] ([facility])        Jun 06, 2025 9:30 AM CDT Hospital Follow Up with Bobby Swift MD [facility] ([facility])        Jul 24, 2025 11:00 AM T [place] [facility] Orientation with [facility] CARD ORIENTATION [facility][1] Cardiopulmonary Rehab ([facility])              [facility][1] Cardiopulmonary Rehab  [facility]  [address] [facility] [address]  [phone] [facility]  [address] [place] [address]  [phone] [facility], [address], [facility]  [address] [facility] [address]  [phone]            Transitional Care Clinic  Was TCC Ordered: No  Was TCC Scheduled: No   - If yes: []  Advised patient to bring all medications and blood glucose meter/supplies if applicable.    Primary Care Provider (If no TCC appointment)  Does patient already have a PCP appointment scheduled? Yes  Care Manager Scheduled PCP office TCM appointment with patient    Specialist  Does the patient have any other follow-up appointment(s) that need to be scheduled? Yes   -If yes: Care Manager reviewed upcoming specialist appointments with patient: Yes   -Does the patient need assistance scheduling appointment(s): No      Book By Date: 6/10/2025

## 2025-05-30 ENCOUNTER — ORDER TRANSCRIPTION (OUTPATIENT)
Dept: CARDIAC REHAB | Facility: HOSPITAL | Age: 60
End: 2025-05-30

## 2025-05-30 DIAGNOSIS — Z95.1 S/P CABG (CORONARY ARTERY BYPASS GRAFT): Primary | ICD-10-CM

## 2025-06-04 ENCOUNTER — TELEPHONE (OUTPATIENT)
Dept: PULMONOLOGY | Facility: CLINIC | Age: 60
End: 2025-06-04

## 2025-06-04 NOTE — H&P
Mr. Shah is a 59 year old male past medical history of hypertension, hyperlipidemia, cervical radiculopathy who presents today for posthospitalization TCM follow-up    Overall, patient was hospitalized 5/22 - 5/27  - Hospital follow-up requested 5/23    Admitted for elective CABG.  Prior to surgery, he was noted to have moderate to severe disease of the mid LAD with abnormal CT FFR and significant disease of the diagonal.  Evaluated by cardiology who recommended coronary catheterization which showed severe left anterior descending disease.  CT surgery was recommended, and he was deemed a good candidate for CABG status post two-vessel bypass.  Did well in the postoperative setting.  Required CIWA but otherwise stable.  Discharged with close follow-up.    Imaging:  MRI cervical spine 12/28/2017  FINDINGS:   The cranio-cervical junction is unremarkable.     There is mild grade 1 anterolisthesis of C3 on C4. There is trace   retrolisthesis of C5 on C6.     There is normal height and signal intensity of the vertebral bodies.     There is moderate loss of C5-6 and C6-7 intervertebral disc height   and signal. There is moderate loss of C3-4 intervertebral disc height   anteriorly.     There is normal signal intensity of the cervical cord.     A 2.4 cm T2 hyperintense signal abnormality in the superficial lobe   of the right parotid gland cannot be fully assessed on the current   study.     At C2-C3: There is very minimal right neural foraminal stenosis. The   left neural foramen and central spinal canal are widely patent.     At C3-C4: A disc osteophyte complex deforms the ventral cord and   causes moderate to severe central spinal narrowing. No associated   cord signal abnormality. There is severe left and moderate/severe   right neural foraminal stenosis.     At C4-C5: A small disc osteophyte complex is seen without cord   deformity. There is severe left and moderate/severe right neural   foraminal stenosis     At  C5-C6:  A disc osteophyte complex deforms the cord and causes   markedly severe central spinal narrowing. Severe bilateral neural   foraminal stenosis is more advanced on the left     At C6-C7: A disc osteophyte complex deforms the right anterior cord   and causes severe central spinal narrowing. Severe bilateral neural   foraminal stenosis is present.       At C7-T1:  A small disc osteophyte complex is seen without cord   deformity. Severe left and mild to moderate right neural foraminal   stenosis       IMPRESSION:     A 2.4 cm T2 hyperintense signal abnormality in the superficial lobe   of the right parotid gland cannot be fully assessed on the current   study. Dedicated CT soft tissue neck with contrast would be helpful   for further assessment.     Multilevel spondylosis is most advanced at C3-4, C5-6 and C6-7. No   abnormal cord signal.      X-rays 7/19/2023  Right hip    Impression   CONCLUSION:  1. No acute fracture dislocation.  2. Moderate right hip osteoarthritis has progressed.  Mild left hip degenerative changes..         Right knee    Impression   CONCLUSION:  1. No acute fracture dislocation.  2. Mild tricompartment osteoarthritis.          Lumbar x-ray 1/13/2025        Impression   CONCLUSION: Moderate degenerative changes within the lower thoracic/lumbar spine.       Cervical x-ray 2/11/2025        Impression   CONCLUSION:  1. Slight reversal normal cervical lordosis.  Multilevel cervical spondylosis.  2. No acute fracture or malalignment.        OSH MRI report 4/25/2025  - Straightening of lumbar lordosis from L1-L2, L4-L5 through the lumbar spine with increased lordotic curvature L3-L4 through L5-S1.  Retrolisthesis at L4-L5 and borderline retrolisthesis at L5-S1     Multilevel disc desiccation and moderate disc space narrowing at L2-L5 with type I Modic endplate changes     L2-L3 3 mm central disc herniation with cranial migration disc bulge, L3-L4 12 mm central disc herniation with disc bulge  and endplate spurring, L4-L5 3 mm central disc herniation    Carotid Doppler 5/22/2025         Impression   CONCLUSION:  1. Mild atherosclerosis of the carotid bifurcations without hemodynamically stenosis or occlusion.     2. Antegrade flow is documented in the vertebral arteries bilaterally.       3. Lesser incidental findings as above.            CT chest 5/22/2025    Impression   CONCLUSION:     Mild calcific atherosclerosis thoracic aorta without evidence of aneurysm.       Chest x-ray 5/26/2025    Impression   CONCLUSION:  1. Postoperative changes from a recent CABG.  Hildreth-Nikolai catheter and left basilar chest tube have been removed.  No pneumothorax.  2. Stable small bilateral pleural effusions suggesting mild CHF or fluid overload.  3. Unchanged mild passive atelectasis at the left lung base.          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 Marion seen before surgery 5/6/2025.  On that visit, CTA CTA calcium score of 662 localized to the LAD mid segment borderline FFR distally positive though nonspecific lesion.  Diagonal branch is significant FFR positive stenosis  - Underwent coronary angiogram 5/22/2025 which confirmed severe distal left main disease, normal EF, normal left ventricular EDP.  Surgical consultation was sought at that time  - Status post two-vessel CABG with Dr. Robbins.  Hospitalized 5/22 - 5/27.  Briefly needed CIWA protocol but otherwise was stable.  Tolerated IV diuresis, had stable expected blood loss anemia and managed medically.  - Currently on aspirin 81 mg, Plavix 75 mg daily, metoprolol 50 mg once a day extended release  - Was seen in cardiology clinic [facility] 5/30/2025.  Ongoing furosemide 20 mg twice a day for lower extremity edema that is improving.   Ongoing optimal medical therapy for cholesterol, blood pressure.  Has follow-up with Dr. Marion, Dr. Robbins    Low back pain due to lumbar disc herniation  Urgent care visit 12/4 reviewed, x-rays were unrevealing.  - Chronic ongoing issue for which we are trying to manage from last office visit.  - MRI 4/25/2025 showed retrolisthesis at multiple levels, multilevel disc desiccation and moderate disc space narrowing L2-L5, central disc herniation L2-L3, L3-L4, L4-L5  -Would recommend initial trial of conservative therapy:     -Acetaminophen 500-650 mg every 4-6 hours as needed for pain relief  - Avoid the use of NSAIDs such as Advil/ibuprofen, Aleve/naproxen while on steroids  - Proceed with prednisone 40 mg for 3 days, 30 mg for 3 days, 20 mg for 3 days, 10 mg for 3 days  -Ibuprofen 200-400 mg every 8 hours as needed for anti-inflammatory and pain relief only when steroids are completed  -For more severe pain, okay for continued postop pain management plan  -Trial of physical therapy can be considered if home stretches and exercises insufficient.          Chronic cough  Lingering cough over the last 3 months, possibly related to allergic rhinitis.  Worsening over the last 2 weeks concerning for symptoms of acute bacterial rhinosinusitis.  -Chest x-ray without significant abnormality  - Trial of flonase 1 spray each nostril until symptoms improve  - Trial of antihistamine Zyrtec/Claritin/Allegra  - Seems to be stable, will monitor     Neck pain  Cervical radiculopathy  Inciting event was bowling last Thursday, then acute onset the next day.  Exam notable for: Spurling test, remains neurologically intact  - Since last visit, this has improved.  There may be some involvement of the right hand with some numbness and tingling  - Physical therapy will also help open up these areas     Psoriasis versus dermatitis  Localized specifically to knuckles of digits 2, 3, 4.  - Continue with home moisturizing cream 1-2 times a day  especially after showering  - Trial of triamcinolone 1 application twice a day for 7 days     Ventral hernia  Only with recumbency, some enlargement but otherwise asymptomatic  - We will clinically monitor for now  - If changes in bowel habits, GI symptoms occur within require surgical intervention.     Left thigh meralgia paresthetica  Intermittent burning sensation of the left thigh.  Possibly related to cervical radiculopathy, versus meralgia paresthetica  - Provided exercises to perform at home for both cervical radiculopathy and meralgia paresthetica  - We will monitor     Hyperlipidemia  -Last lipid panel LDL 95  - Repeat fasting lipid panel in the future   - Continue with atorvastatin 80 mg for further LDL control       HTN  - Hydrochlorothiazide-losartan was held.  Blood pressure goal less than 130/80.  Observing for now, resume losartan  - On Toprol-XL 50 mg once a day  - We will continue current management for now and monitor over time     Weight management         Wt Readings from Last 6 Encounters:   05/27/25 263 lb 0.1 oz (119.3 kg)   04/29/25 275 lb (124.7 kg)   01/09/25 269 lb (122 kg)   07/23/24 263 lb (119.3 kg)   01/16/24 263 lb (119.3 kg)   06/27/23 257 lb (116.6 kg)          We discussed the importance of net caloric reduction by:  -Optimizing nutrition.  Would benefit to focus on high-fiber intake with fruits, vegetables.  Opt for leaner meats such as chicken/fish/turkey/pork, baked rather than fried/use of high oil content.  Low-fat dairy can be incorporated.  - The goal for nutrition is to gradually decrease calories per day.  We discussed other methods to achieve this such as decreasing excessive snacking, decreasing portions, decrease the frequency of eating out.  - Weight gain is best obtained by modifying food intake.  However, exercise can help achieve caloric reduction by burning off calories.  Recommend at least 150 minutes of moderate intensity exercise for weight maintenance.  Higher  intensity exercise for longer periods of time can also promote weight loss   -These lifestyle changes should be viewed as long-term even with weight loss.  This promotes overall general health and decrease risk for chronic diseases in the future.  -We will attempt a trial with conservative measures within 3 months.  If no improvement, we can consider medications or possibly referral to a specialist in the future  - Reconvene once he recovers from the postop setting

## 2025-06-04 NOTE — TELEPHONE ENCOUNTER
Patient recently had 5/23/2025 Bypass Surgery and wanted to know if it is ok to reschedule Sleep Study?  Please call.  Thank you.

## 2025-06-04 NOTE — PATIENT INSTRUCTIONS
You are seen in clinic today for back pain.  Today, I recommended:    We reviewed your hospital course including cardiac catheterization and subsequent bypass surgery  As you are having here that you are recovering very well.  This will help long-term reduce risk for further coronary artery disease complications  - Ongoing blood pressure control  - Continue monitoring your weights at home to evaluate for the leg swelling  - Lets give the cholesterol medication at the higher dosage for more time.    Follow-up with cardiology, Dr. Marion and Dr. Robbins    MRI of the back did show multiple levels of disc herniation likely as the cause of back pain and nerve impingement.  -Acetaminophen 500-650 mg every 4-6 hours as needed for pain relief  - Avoid the use of NSAIDs such as Advil/ibuprofen, Aleve/naproxen while on steroids  - Proceed with prednisone 40 mg for 3 days, 30 mg for 3 days, 20 mg for 3 days, 10 mg for 3 days  -Ibuprofen 200-400 mg every 8 hours as needed for anti-inflammatory and pain relief only when steroids are completed  -For more severe pain, okay for continued postop pain management plan  -Trial of physical therapy can be considered if home stretches and exercises insufficient.    Monitor for any changes of the cervical neck pain      Return to clinic in 3 months for annual physical exam

## 2025-06-06 ENCOUNTER — OFFICE VISIT (OUTPATIENT)
Dept: INTERNAL MEDICINE CLINIC | Facility: CLINIC | Age: 60
End: 2025-06-06

## 2025-06-06 VITALS
HEART RATE: 74 BPM | BODY MASS INDEX: 37.21 KG/M2 | HEIGHT: 71 IN | WEIGHT: 265.81 LBS | SYSTOLIC BLOOD PRESSURE: 122 MMHG | DIASTOLIC BLOOD PRESSURE: 68 MMHG | OXYGEN SATURATION: 95 % | TEMPERATURE: 99 F

## 2025-06-06 DIAGNOSIS — M54.50 MIDLINE LOW BACK PAIN, UNSPECIFIED CHRONICITY, UNSPECIFIED WHETHER SCIATICA PRESENT: ICD-10-CM

## 2025-06-06 DIAGNOSIS — G47.33 OSA (OBSTRUCTIVE SLEEP APNEA): ICD-10-CM

## 2025-06-06 DIAGNOSIS — Z09 HOSPITAL DISCHARGE FOLLOW-UP: Primary | ICD-10-CM

## 2025-06-06 DIAGNOSIS — E78.5 DYSLIPIDEMIA: ICD-10-CM

## 2025-06-06 DIAGNOSIS — I25.10 CORONARY ARTERY DISEASE INVOLVING NATIVE CORONARY ARTERY OF NATIVE HEART WITHOUT ANGINA PECTORIS: ICD-10-CM

## 2025-06-06 DIAGNOSIS — E03.9 HYPOTHYROIDISM, UNSPECIFIED TYPE: ICD-10-CM

## 2025-06-06 DIAGNOSIS — I10 HTN (HYPERTENSION), BENIGN: ICD-10-CM

## 2025-06-06 NOTE — PROGRESS NOTES
Subjective:   Elias Shah is a 59 year old male who presents for hospital follow up.   He was discharged from [facility] to Home Health Care Services  Admission Date: 5/22/25   Discharge Date: 5/27/25  Hospital Discharge Diagnosis: CAD s/p CABG    Interactive contact within 2 business days post discharge first initiated on Date: 5/28/2025    I accessed Anaergia and/or YEOXIN VMall Everywhere and personally reviewed the following for the recent hospitalization: provider notes, consults, summaries, labs and other test results and the pertinent findings are documented below.     HPI:     Mr. Shah is a 59 year old male past medical history of hypertension, hyperlipidemia, cervical radiculopathy who presents today for posthospitalization TCM follow-up     Overall, patient was hospitalized 5/22 - 5/27  - Hospital follow-up requested 5/23     Admitted for elective CABG.  Prior to surgery, he was noted to have moderate to severe disease of the mid LAD with abnormal CT FFR and significant disease of the diagonal.  Evaluated by cardiology who recommended coronary catheterization which showed severe left anterior descending disease.  CT surgery was recommended, and he was deemed a good candidate for CABG status post two-vessel bypass.  Did well in the postoperative setting.  Required CIWA but otherwise stable.  Discharged with close follow-up.    0 pain, 1 Norco. More discomfort on the back. No Shortness of breath. He is walking more. PT and leg exercises, marching in place. No fevers nor chills. The back of the hands feel cold.  He recalls that     Appetite is coming back. Burping a lot more. Using the incentive spirometry        History/Other:   Current Medications:  Medication Reconciliation:  I am aware of an inpatient discharge within the last 30 days.  The discharge medication list has been reconciled with the patient's current medication list and reviewed by me. See medication list for additions of new medication,  and changes to current doses of medications and discontinued medications.  Outpatient Medications Marked as Taking for the 6/6/25 encounter (Office Visit) with Bobby Siwft MD   Medication Sig    metoprolol succinate ER 50 MG Oral Tablet 24 Hr Take 1 tablet (50 mg total) by mouth daily.    amiodarone 200 MG Oral Tab Take 1 tablet (200 mg total) by mouth 2 (two) times daily for 7 days, THEN 1 tablet (200 mg total) daily.    ascorbic acid 500 MG Oral Tab Take 1 tablet (500 mg total) by mouth 3 (three) times daily.    clopidogrel (PLAVIX) 75 MG Oral Tab Take 1 tablet (75 mg total) by mouth daily.    famotidine 20 MG Oral Tab Take 1 tablet (20 mg total) by mouth 2 (two) times daily.    furosemide 20 MG Oral Tab Take 1 tablet (20 mg total) by mouth 2 (two) times daily for 14 days.    potassium chloride 10 MEQ Oral Tab CR Take 1 tablet (10 mEq total) by mouth 2 (two) times daily for 14 days.    multivitamin Oral Tab Take 1 tablet by mouth daily.    cyclobenzaprine 5 MG Oral Tab Take 1 tablet (5 mg total) by mouth 3 (three) times daily as needed for Muscle spasms.    albuterol 108 (90 Base) MCG/ACT Inhalation Aero Soln Inhale 2 puffs into the lungs every 6 (six) hours as needed.    levothyroxine 25 MCG Oral Tab Take 1 tablet (25 mcg total) by mouth before breakfast.    fluocinolone 0.01 % External Cream 1 thin application to the affected areas twice a day as needed.    atorvastatin 40 MG Oral Tab TAKE 1 TABLET BY MOUTH EVERY DAY (Patient taking differently: Take 2 tablets (80 mg total) by mouth nightly. Per cardiology)    triamcinolone 0.1 % External Cream Apply topically 2 (two) times daily as needed.    aspirin (ASPIR-LOW) 81 MG Oral Tab EC Take 1 tablet (81 mg total) by mouth daily.       Review of Systems:  GENERAL: weight stable, energy stable, no sweating  SKIN: denies any unusual skin lesions  EYES: denies blurred vision or double vision  HEENT: denies nasal congestion, sinus pain or ST  LUNGS: denies shortness of  breath with exertion  CARDIOVASCULAR: denies chest pain on exertion or palpitations  GI: denies abdominal pain, denies heartburn, denies diarrhea  MUSCULOSKELETAL: denies pain, normal range of motion of extremities  NEURO: denies headaches, denies dizziness, denies weakness  PSYCHE: denies depression or anxiety  HEMATOLOGIC: denies hx of anemia, or bruising, denies bleeding  ENDOCRINE: denies thyroid history  ALL/ASTHMA: denies hx of allergy or asthma    Objective:   No LMP for male patient.  Estimated body mass index is 37.07 kg/m² as calculated from the following:    Height as of this encounter: 5' 11\" (1.803 m).    Weight as of this encounter: 265 lb 12.8 oz (120.6 kg).   /68   Pulse 74   Temp 98.8 °F (37.1 °C)   Ht 5' 11\" (1.803 m)   Wt 265 lb 12.8 oz (120.6 kg)   SpO2 95%   BMI 37.07 kg/m²    GENERAL: well developed, well nourished, in no apparent distress  SKIN: no rashes, no suspicious lesions  HEENT: atraumatic, normocephalic, ears and throat are clear  EYES: PERRLA, EOMI, conjunctiva are clear  NECK: supple, no adenopathy, no bruits  CHEST: no chest tenderness  LUNGS: clear to auscultation  CARDIO: RRR without murmur  GI: good BS's, no masses, HSM or tenderness  MUSCULOSKELETAL: back is not tender, FROM of the extremities  EXTREMITIES: no cyanosis, clubbing or edema  NEURO: Oriented times three, cranial nerves are intact, motor and sensory are grossly intact    Recent Results (from the past 8760 hours)   CBC With Differential With Platelet    Collection Time: 05/27/25  9:29 AM   Result Value Ref Range    WBC 11.2 (H) 4.0 - 11.0 x10(3) uL    RBC 3.54 (L) 4.30 - 5.70 x10(6)uL    HGB 11.2 (L) 13.0 - 17.5 g/dL    HCT 31.9 (L) 39.0 - 53.0 %    MCV 90.1 80.0 - 100.0 fL    MCH 31.6 26.0 - 34.0 pg    MCHC 35.1 31.0 - 37.0 g/dL    RDW-SD 41.3 35.1 - 46.3 fL    RDW 12.5 11.0 - 15.0 %    .0 150.0 - 450.0 10(3)uL    Neutrophil Absolute Prelim 7.76 (H) 1.50 - 7.70 x10 (3) uL    Neutrophil Absolute  7.76 (H) 1.50 - 7.70 x10(3) uL    Lymphocyte Absolute 1.48 1.00 - 4.00 x10(3) uL    Monocyte Absolute 1.48 (H) 0.10 - 1.00 x10(3) uL    Eosinophil Absolute 0.30 0.00 - 0.70 x10(3) uL    Basophil Absolute 0.05 0.00 - 0.20 x10(3) uL    Immature Granulocyte Absolute 0.13 0.00 - 1.00 x10(3) uL    Neutrophil % 69.3 %    Lymphocyte % 13.2 %    Monocyte % 13.2 %    Eosinophil % 2.7 %    Basophil % 0.4 %    Immature Granulocyte % 1.2 %     *Note: Due to a large number of results and/or encounters for the requested time period, some results have not been displayed. A complete set of results can be found in Results Review.       Recent Results (from the past 8760 hours)   Basic Metabolic Panel (8)    Collection Time: 05/27/25  9:29 AM   Result Value Ref Range    Glucose 133 (H) 70 - 99 mg/dL    Sodium 133 (L) 136 - 145 mmol/L    Potassium 3.3 (L) 3.5 - 5.1 mmol/L    Chloride 98 98 - 112 mmol/L    CO2 25.0 21.0 - 32.0 mmol/L    Anion Gap 10 0 - 18 mmol/L    BUN 15 9 - 23 mg/dL    Creatinine 0.94 0.70 - 1.30 mg/dL    BUN/CREA Ratio 16.0 10.0 - 20.0    Calcium, Total 9.0 8.7 - 10.4 mg/dL    Calculated Osmolality 279 275 - 295 mOsm/kg    eGFR-Cr 93 >=60 mL/min/1.73m2     Comment: eGFR calculated using the CKD-EPI 2021 calculation     *Note: Due to a large number of results and/or encounters for the requested time period, some results have not been displayed. A complete set of results can be found in Results Review.     Imaging:  MRI cervical spine 12/28/2017  FINDINGS:   The cranio-cervical junction is unremarkable.     There is mild grade 1 anterolisthesis of C3 on C4. There is trace   retrolisthesis of C5 on C6.     There is normal height and signal intensity of the vertebral bodies.     There is moderate loss of C5-6 and C6-7 intervertebral disc height   and signal. There is moderate loss of C3-4 intervertebral disc height   anteriorly.     There is normal signal intensity of the cervical cord.     A 2.4 cm T2 hyperintense  signal abnormality in the superficial lobe   of the right parotid gland cannot be fully assessed on the current   study.     At C2-C3: There is very minimal right neural foraminal stenosis. The   left neural foramen and central spinal canal are widely patent.     At C3-C4: A disc osteophyte complex deforms the ventral cord and   causes moderate to severe central spinal narrowing. No associated   cord signal abnormality. There is severe left and moderate/severe   right neural foraminal stenosis.     At C4-C5: A small disc osteophyte complex is seen without cord   deformity. There is severe left and moderate/severe right neural   foraminal stenosis     At C5-C6:  A disc osteophyte complex deforms the cord and causes   markedly severe central spinal narrowing. Severe bilateral neural   foraminal stenosis is more advanced on the left     At C6-C7: A disc osteophyte complex deforms the right anterior cord   and causes severe central spinal narrowing. Severe bilateral neural   foraminal stenosis is present.       At C7-T1:  A small disc osteophyte complex is seen without cord   deformity. Severe left and mild to moderate right neural foraminal   stenosis       IMPRESSION:     A 2.4 cm T2 hyperintense signal abnormality in the superficial lobe   of the right parotid gland cannot be fully assessed on the current   study. Dedicated CT soft tissue neck with contrast would be helpful   for further assessment.     Multilevel spondylosis is most advanced at C3-4, C5-6 and C6-7. No   abnormal cord signal.      X-rays 7/19/2023  Right hip    Impression   CONCLUSION:  1. No acute fracture dislocation.  2. Moderate right hip osteoarthritis has progressed.  Mild left hip degenerative changes..         Right knee    Impression   CONCLUSION:  1. No acute fracture dislocation.  2. Mild tricompartment osteoarthritis.            Lumbar x-ray 1/13/2025        Impression   CONCLUSION: Moderate degenerative changes within the lower  thoracic/lumbar spine.         Cervical x-ray 2/11/2025        Impression   CONCLUSION:  1. Slight reversal normal cervical lordosis.  Multilevel cervical spondylosis.  2. No acute fracture or malalignment.         [facility] MRI report 4/25/2025  - Straightening of lumbar lordosis from L1-L2, L4-L5 through the lumbar spine with increased lordotic curvature L3-L4 through L5-S1.  Retrolisthesis at L4-L5 and borderline retrolisthesis at L5-S1     Multilevel disc desiccation and moderate disc space narrowing at L2-L5 with type I Modic endplate changes     L2-L3 3 mm central disc herniation with cranial migration disc bulge, L3-L4 12 mm central disc herniation with disc bulge and endplate spurring, L4-L5 3 mm central disc herniation     Carotid Doppler 5/22/2025         Impression   CONCLUSION:  1. Mild atherosclerosis of the carotid bifurcations without hemodynamically stenosis or occlusion.     2. Antegrade flow is documented in the vertebral arteries bilaterally.       3. Lesser incidental findings as above.               CT chest 5/22/2025    Impression   CONCLUSION:     Mild calcific atherosclerosis thoracic aorta without evidence of aneurysm.         Chest x-ray 5/26/2025    Impression   CONCLUSION:  1. Postoperative changes from a recent CABG.  [place] catheter and left basilar chest tube have been removed.  No pneumothorax.  2. Stable small bilateral pleural effusions suggesting mild CHF or fluid overload.  3. Unchanged mild passive atelectasis at the left lung base.            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 Hospital discharge follow-up (Primary)  2. Midline low back pain, unspecified chronicity, unspecified whether sciatica present  3. Hypothyroidism, unspecified type  4. Dyslipidemia  5. Coronary artery disease involving native  coronary artery of native heart without angina pectoris  6. HTN (hypertension), benign  7. JAMES (obstructive sleep apnea)      Coronary artery disease  - Recall that patient establish with Dr. Marion seen before surgery 5/6/2025.  On that visit, CTA CTA calcium score of 662 localized to the LAD mid segment borderline FFR distally positive though nonspecific lesion.  Diagonal branch is significant FFR positive stenosis  - Underwent coronary angiogram 5/22/2025 which confirmed severe distal left main disease, normal EF, normal left ventricular EDP.  Surgical consultation was sought at that time  - Status post two-vessel CABG with Dr. Robbins.  Hospitalized 5/22 - 5/27.  Briefly needed CIWA protocol but otherwise was stable.  Tolerated IV diuresis, had stable expected blood loss anemia and managed medically.  - Currently on aspirin 81 mg, Plavix 75 mg daily, metoprolol 50 mg once a day extended release  - Was seen in cardiology clinic [facility] 5/30/2025.  Ongoing furosemide 20 mg twice a day for lower extremity edema that is improving.  Ongoing optimal medical therapy for cholesterol, blood pressure.  Has follow-up with Dr. Marion, Dr. Robbins    We did discuss that there are no clear guidelines on the resumption of alcohol and caffeine.  Discussed that alcohol can have some wound care healing effects.  Would at least wait 1-2 weeks before resuming 1 glass/day.  Caffeine is more indeterminate, the stimulant effect makes me hesitant given his cardiac status at this time.  Would hold this indefinitely unless cleared by CV surgery, cardiology to resume.     Low back pain due to lumbar disc herniation  Urgent care visit 12/4 reviewed, x-rays were unrevealing.  - Chronic ongoing issue for which we are trying to manage from last office visit.  - MRI 4/25/2025 showed retrolisthesis at multiple levels, multilevel disc desiccation and moderate disc space narrowing L2-L5, central disc herniation L2-L3, L3-L4, L4-L5  -Would  recommend initial trial of conservative therapy:     -Acetaminophen 500-650 mg every 4-6 hours as needed for pain relief  - Avoid the use of NSAIDs such as Advil/ibuprofen, Aleve/naproxen while on steroids  - Proceed with prednisone 40 mg for 3 days, 30 mg for 3 days, 20 mg for 3 days, 10 mg for 3 days  -Ibuprofen 200-400 mg every 8 hours as needed for anti-inflammatory and pain relief only when steroids are completed  -For more severe pain, okay for continued postop pain management plan  -Trial of physical therapy can be considered if home stretches and exercises insufficient.       Chronic cough  Lingering cough over the last 3 months, possibly related to allergic rhinitis.  Worsening over the last 2 weeks concerning for symptoms of acute bacterial rhinosinusitis.  -Chest x-ray without significant abnormality  - Trial of flonase 1 spray each nostril until symptoms improve  - Trial of antihistamine Zyrtec/Claritin/Allegra  - Continue with management for postnasal drip, possible seasonal allergies     Neck pain  Cervical radiculopathy  Inciting event was bowling last Thursday, then acute onset the next day.  Exam notable for: Spurling test, remains neurologically intact  - Since last visit, this has improved.  There may be some involvement of the right hand with some numbness and tingling  - Physical therapy will also help open up these areas     Psoriasis versus dermatitis  Localized specifically to knuckles of digits 2, 3, 4.  - Continue with home moisturizing cream 1-2 times a day especially after showering  - Trial of triamcinolone 1 application twice a day for 7 days     Ventral hernia  Only with recumbency, some enlargement but otherwise asymptomatic  - We will clinically monitor for now  - If changes in bowel habits, GI symptoms occur within require surgical intervention.     Left thigh meralgia paresthetica  Intermittent burning sensation of the left thigh.  Possibly related to cervical radiculopathy, versus  meralgia paresthetica  - Provided exercises to perform at home for both cervical radiculopathy and meralgia paresthetica  - We will monitor     Hyperlipidemia  -Last lipid panel LDL 95  - Repeat fasting lipid panel in the future   - Continue with atorvastatin 80 mg for further LDL control        HTN  - Hydrochlorothiazide-losartan was held.  Blood pressure goal less than 130/80.  Observing for now, resume losartan  - On Toprol-XL 50 mg once a day  - We will continue current management for now and monitor over time     Weight management        Wt Readings from Last 6 Encounters:   06/06/25 265 lb 12.8 oz (120.6 kg)   05/27/25 263 lb 0.1 oz (119.3 kg)   04/29/25 275 lb (124.7 kg)   01/09/25 269 lb (122 kg)   07/23/24 263 lb (119.3 kg)   01/16/24 263 lb (119.3 kg)       We discussed the importance of net caloric reduction by:  -Optimizing nutrition.  Would benefit to focus on high-fiber intake with fruits, vegetables.  Opt for leaner meats such as chicken/fish/turkey/pork, baked rather than fried/use of high oil content.  Low-fat dairy can be incorporated.  - The goal for nutrition is to gradually decrease calories per day.  We discussed other methods to achieve this such as decreasing excessive snacking, decreasing portions, decrease the frequency of eating out.  - Weight gain is best obtained by modifying food intake.  However, exercise can help achieve caloric reduction by burning off calories.  Recommend at least 150 minutes of moderate intensity exercise for weight maintenance.  Higher intensity exercise for longer periods of time can also promote weight loss   -These lifestyle changes should be viewed as long-term even with weight loss.  This promotes overall general health and decrease risk for chronic diseases in the future.  -We will attempt a trial with conservative measures within 3 months.  If no improvement, we can consider medications or possibly referral to a specialist in the future    Return in about 3  months (around 9/6/2025) for Annual physical exam.

## 2025-06-06 NOTE — TELEPHONE ENCOUNTER
Dr. Marley, please review patient message below. He has postponed his sleep study due to recent CABG and his recovery period. Thank you.

## 2025-06-23 RX ORDER — METOPROLOL SUCCINATE 50 MG/1
50 TABLET, EXTENDED RELEASE ORAL DAILY
Qty: 30 TABLET | Refills: 0 | OUTPATIENT
Start: 2025-06-23

## 2025-06-23 RX ORDER — AMIODARONE HYDROCHLORIDE 200 MG/1
TABLET ORAL
Qty: 44 TABLET | Refills: 0 | OUTPATIENT
Start: 2025-06-23 | End: 2025-07-30

## 2025-06-23 NOTE — TELEPHONE ENCOUNTER
Refill request has failed the Ambulatory Medication Refill Standing Order and is routed to the primary physician to review the following:    Requested Prescriptions     Refused Prescriptions Disp Refills    metoprolol succinate ER 50 MG Oral Tablet 24 Hr 30 tablet 0     Sig: Take 1 tablet (50 mg total) by mouth daily.    amiodarone 200 MG Oral Tab 44 tablet 0     Sig: Take 1 tablet (200 mg total) by mouth 2 (two) times daily for 7 days, THEN 1 tablet (200 mg total) daily.       Medications not ordered by Dr. Swift

## 2025-07-07 ENCOUNTER — MED REC SCAN ONLY (OUTPATIENT)
Dept: INTERNAL MEDICINE CLINIC | Facility: CLINIC | Age: 60
End: 2025-07-07

## 2025-07-07 ENCOUNTER — TELEPHONE (OUTPATIENT)
Dept: INTERNAL MEDICINE CLINIC | Facility: CLINIC | Age: 60
End: 2025-07-07

## 2025-07-07 NOTE — TELEPHONE ENCOUNTER
Home health orders received from [facility]- orders signed and faxed over with confirmation.

## 2025-07-22 NOTE — TELEPHONE ENCOUNTER
Received faxed refill request for below Medication from [facility][id number]  Levothyroxine 25mcg Tablet  Qty #90  Refills: 3  Take 1 tablet by mouth before breakfast    Patient has been reassigned to Dr. Leticia Pinto due to Dr. Swift's practice closing

## 2025-07-24 ENCOUNTER — CARDPULM VISIT (OUTPATIENT)
Dept: CARDIAC REHAB | Facility: HOSPITAL | Age: 60
End: 2025-07-24
Attending: INTERNAL MEDICINE
Payer: COMMERCIAL

## 2025-07-24 DIAGNOSIS — Z95.1 S/P CABG (CORONARY ARTERY BYPASS GRAFT): Primary | ICD-10-CM

## 2025-07-24 RX ORDER — LEVOTHYROXINE SODIUM 25 UG/1
25 TABLET ORAL
Qty: 90 TABLET | Refills: 0 | Status: SHIPPED | OUTPATIENT
Start: 2025-07-24

## 2025-07-24 NOTE — TELEPHONE ENCOUNTER
Refill passed per [facility] protocol.  Patient advised to schedule an appointment.    Requested Prescriptions   Pending Prescriptions Disp Refills    levothyroxine 25 MCG Oral Tab 90 tablet 3     Sig: Take 1 tablet (25 mcg total) by mouth before breakfast.       Thyroid Medication Protocol Passed - 7/23/2025  8:23 PM        Passed - TSH in past 12 months        Passed - Last TSH value is normal     Lab Results   Component Value Date    TSH 3.082 05/23/2025                 Passed - In person appointment or virtual visit in the past 12 mos or appointment in next 3 mos     Recent Outpatient Visits              1 month ago Hospital discharge follow-up    [facility] [place] [facility]    Office Visit    2 months ago Hypersomnia    [facility]    Office Visit    6 months ago Midline low back pain, unspecified chronicity, unspecified whether sciatica present    [facility] [place] [facility] [place], MD    Office Visit    1 year ago Annual physical exam    [facility] [place] [facility] [place], MD    Office Visit    1 year ago Chronic cough    [facility] [place] [facility] [place], MD    Office Visit          Future Appointments         Provider Department Appt Notes    Tomorrow [facility] 1 - [facility] - Cardiopulmonary Rehab CABG x 2 5/23/25  Dr. Marion                    Passed - Medication is active on med list             Recent Outpatient Visits              1 month ago Hospital discharge follow-up    [facility] [place] [facility], Carlo, MD    Office Visit    2 months ago Hypersomnia    [facility]    Office Visit    6 months ago Midline low back pain, unspecified chronicity, unspecified whether  sciatica present    [facility]    Office Visit    1 year ago [facility] physical exam    [facility] Carlo, [facility]    Office Visit    1 year ago Chronic cough    [facility]    Office Visit            Future Appointments         Provider Department Appt Notes    Tomorrow [facility] 1 - [facility] - Cardiopulmonary Rehab CABG x 2 5/23/25  Dr. Marion

## 2025-07-30 ENCOUNTER — CARDPULM VISIT (OUTPATIENT)
Dept: CARDIAC REHAB | Facility: HOSPITAL | Age: 60
End: 2025-07-30
Attending: INTERNAL MEDICINE
Payer: COMMERCIAL

## 2025-07-30 PROCEDURE — 93798 PHYS/QHP OP CAR RHAB W/ECG: CPT

## 2025-08-01 ENCOUNTER — CARDPULM VISIT (OUTPATIENT)
Dept: CARDIAC REHAB | Facility: HOSPITAL | Age: 60
End: 2025-08-01
Attending: INTERNAL MEDICINE

## 2025-08-01 PROCEDURE — 93798 PHYS/QHP OP CAR RHAB W/ECG: CPT

## 2025-08-04 ENCOUNTER — CARDPULM VISIT (OUTPATIENT)
Dept: CARDIAC REHAB | Facility: HOSPITAL | Age: 60
End: 2025-08-04
Attending: INTERNAL MEDICINE

## 2025-08-04 PROCEDURE — 93798 PHYS/QHP OP CAR RHAB W/ECG: CPT

## 2025-08-06 ENCOUNTER — CARDPULM VISIT (OUTPATIENT)
Dept: CARDIAC REHAB | Facility: HOSPITAL | Age: 60
End: 2025-08-06
Attending: INTERNAL MEDICINE

## 2025-08-06 PROCEDURE — 93798 PHYS/QHP OP CAR RHAB W/ECG: CPT

## 2025-08-06 RX ORDER — LEVOTHYROXINE SODIUM 25 UG/1
25 TABLET ORAL
Qty: 90 TABLET | Refills: 0 | OUTPATIENT
Start: 2025-08-06

## 2025-08-08 ENCOUNTER — CARDPULM VISIT (OUTPATIENT)
Dept: CARDIAC REHAB | Facility: HOSPITAL | Age: 60
End: 2025-08-08
Attending: INTERNAL MEDICINE

## 2025-08-08 PROCEDURE — 93798 PHYS/QHP OP CAR RHAB W/ECG: CPT

## 2025-08-11 ENCOUNTER — CARDPULM VISIT (OUTPATIENT)
Dept: CARDIAC REHAB | Facility: HOSPITAL | Age: 60
End: 2025-08-11
Attending: INTERNAL MEDICINE

## 2025-08-11 PROCEDURE — 93798 PHYS/QHP OP CAR RHAB W/ECG: CPT

## 2025-08-13 ENCOUNTER — CARDPULM VISIT (OUTPATIENT)
Dept: CARDIAC REHAB | Facility: HOSPITAL | Age: 60
End: 2025-08-13
Attending: INTERNAL MEDICINE

## 2025-08-13 PROCEDURE — 93798 PHYS/QHP OP CAR RHAB W/ECG: CPT

## 2025-08-14 ENCOUNTER — LAB ENCOUNTER (OUTPATIENT)
Dept: LAB | Age: 60
End: 2025-08-14
Attending: INTERNAL MEDICINE

## 2025-08-14 DIAGNOSIS — E78.5 HYPERLIPEMIA: Primary | ICD-10-CM

## 2025-08-14 DIAGNOSIS — I10 HYPERTENSION: ICD-10-CM

## 2025-08-14 LAB
ALBUMIN SERPL-MCNC: 4.8 G/DL (ref 3.2–4.8)
ALBUMIN/GLOB SERPL: 2.2 (ref 1–2)
ALP LIVER SERPL-CCNC: 103 U/L (ref 45–117)
ALT SERPL-CCNC: 27 U/L (ref 10–49)
ANION GAP SERPL CALC-SCNC: 5 MMOL/L (ref 0–18)
AST SERPL-CCNC: 16 U/L (ref ?–34)
BILIRUB SERPL-MCNC: 1 MG/DL (ref 0.3–1.2)
BUN BLD-MCNC: 11 MG/DL (ref 9–23)
BUN/CREAT SERPL: 10 (ref 10–20)
CALCIUM BLD-MCNC: 9.8 MG/DL (ref 8.7–10.4)
CHLORIDE SERPL-SCNC: 103 MMOL/L (ref 98–112)
CHOLEST SERPL-MCNC: 156 MG/DL (ref ?–200)
CO2 SERPL-SCNC: 30 MMOL/L (ref 21–32)
CREAT BLD-MCNC: 1.1 MG/DL (ref 0.7–1.3)
EGFRCR SERPLBLD CKD-EPI 2021: 77 ML/MIN/1.73M2 (ref 60–?)
FASTING PATIENT LIPID ANSWER: YES
FASTING STATUS PATIENT QL REPORTED: YES
GLOBULIN PLAS-MCNC: 2.2 G/DL (ref 2–3.5)
GLUCOSE BLD-MCNC: 102 MG/DL (ref 70–99)
HDLC SERPL-MCNC: 50 MG/DL (ref 40–59)
LDLC SERPL CALC-MCNC: 91 MG/DL (ref ?–100)
NONHDLC SERPL-MCNC: 106 MG/DL (ref ?–130)
OSMOLALITY SERPL CALC.SUM OF ELEC: 286 MOSM/KG (ref 275–295)
POTASSIUM SERPL-SCNC: 4.4 MMOL/L (ref 3.5–5.1)
PROT SERPL-MCNC: 7 G/DL (ref 5.7–8.2)
SODIUM SERPL-SCNC: 138 MMOL/L (ref 136–145)
TRIGL SERPL-MCNC: 78 MG/DL (ref 30–149)
VLDLC SERPL CALC-MCNC: 13 MG/DL (ref 0–30)

## 2025-08-14 PROCEDURE — 80053 COMPREHEN METABOLIC PANEL: CPT

## 2025-08-14 PROCEDURE — 80061 LIPID PANEL: CPT

## 2025-08-14 PROCEDURE — 36415 COLL VENOUS BLD VENIPUNCTURE: CPT

## 2025-08-15 ENCOUNTER — CARDPULM VISIT (OUTPATIENT)
Dept: CARDIAC REHAB | Facility: HOSPITAL | Age: 60
End: 2025-08-15
Attending: INTERNAL MEDICINE

## 2025-08-15 PROCEDURE — 93798 PHYS/QHP OP CAR RHAB W/ECG: CPT

## 2025-08-18 ENCOUNTER — CARDPULM VISIT (OUTPATIENT)
Dept: CARDIAC REHAB | Facility: HOSPITAL | Age: 60
End: 2025-08-18
Attending: INTERNAL MEDICINE

## 2025-08-18 PROCEDURE — 93798 PHYS/QHP OP CAR RHAB W/ECG: CPT

## 2025-08-20 ENCOUNTER — CARDPULM VISIT (OUTPATIENT)
Dept: CARDIAC REHAB | Facility: HOSPITAL | Age: 60
End: 2025-08-20
Attending: INTERNAL MEDICINE

## 2025-08-20 ENCOUNTER — MED REC SCAN ONLY (OUTPATIENT)
Dept: INTERNAL MEDICINE CLINIC | Facility: CLINIC | Age: 60
End: 2025-08-20

## 2025-08-20 PROCEDURE — 93798 PHYS/QHP OP CAR RHAB W/ECG: CPT

## 2025-08-22 ENCOUNTER — CARDPULM VISIT (OUTPATIENT)
Dept: CARDIAC REHAB | Facility: HOSPITAL | Age: 60
End: 2025-08-22
Attending: INTERNAL MEDICINE

## 2025-08-22 PROCEDURE — 93798 PHYS/QHP OP CAR RHAB W/ECG: CPT

## 2025-08-25 ENCOUNTER — CARDPULM VISIT (OUTPATIENT)
Dept: CARDIAC REHAB | Facility: HOSPITAL | Age: 60
End: 2025-08-25
Attending: INTERNAL MEDICINE

## 2025-08-25 PROCEDURE — 93798 PHYS/QHP OP CAR RHAB W/ECG: CPT

## 2025-08-27 ENCOUNTER — CARDPULM VISIT (OUTPATIENT)
Dept: CARDIAC REHAB | Facility: HOSPITAL | Age: 60
End: 2025-08-27
Attending: INTERNAL MEDICINE

## 2025-08-27 PROCEDURE — 93798 PHYS/QHP OP CAR RHAB W/ECG: CPT

## 2025-08-29 ENCOUNTER — APPOINTMENT (OUTPATIENT)
Dept: CARDIAC REHAB | Facility: HOSPITAL | Age: 60
End: 2025-08-29
Attending: INTERNAL MEDICINE

## (undated) DEVICE — 12 ML SYRINGE LUER-LOCK TIP: Brand: MONOJECT

## (undated) DEVICE — HEART A: Brand: MEDLINE INDUSTRIES, INC.

## (undated) DEVICE — PUNCH AORT 4MM SS THRMPLSTC SLF ALIGNING

## (undated) DEVICE — FORESIGHT LARGE SENSOR: Brand: FORESIGHT

## (undated) DEVICE — CATHETER THOR 32FR L23IN BLU RADPQ STRP THRM

## (undated) DEVICE — SUT PROL 7-0 18IN BV-1 NABSRB BLU L9.3MM 3/8

## (undated) DEVICE — PACK PERFUSION CUSTOM W BCARE5

## (undated) DEVICE — SUT VCRL 2-0 36IN CT-1 ABSRB UD L36MM 1/2 CIR

## (undated) DEVICE — PROVE COVER: Brand: UNBRANDED

## (undated) DEVICE — SUT VCRL 1-0 36IN CTX ABSRB UD L48MM 1/2 CIR

## (undated) DEVICE — HANDPIECE SUR ROTARY NRECHRG BTTRY PWR ZDRIVE

## (undated) DEVICE — WAX BNE 2.5GM BEESWAX PAR AND ISO PALMITATE

## (undated) DEVICE — BNDG,ELSTC,MATRIX,STRL,6"X5YD,LF,HOOK&LP: Brand: MEDLINE

## (undated) DEVICE — ABSORBABLE HEMOSTAT (OXIDIZED REGENERATED CELLULOSE): Brand: SURGICEL NU-KNIT

## (undated) DEVICE — CATHETER THOR 32FR L23IN R ANG CLR PVC HEP

## (undated) DEVICE — SUT PERMA- 2-0 18IN SH NABSRB BLK CR 26MM

## (undated) DEVICE — 12 FOOT DISPOSABLE EXTENSION CABLE WITH SAFE CONNECT / SCREW-DOWN

## (undated) DEVICE — DRAPE DISC W112XL168CM DISP FOR HUSH SLUSH

## (undated) DEVICE — DISPOSABLE CABLE TIE GUN: Brand: BIOSEAL INC.

## (undated) DEVICE — GAMMEX® PI HYBRID SIZE 8, STERILE POWDER-FREE SURGICAL GLOVE, POLYISOPRENE AND NEOPRENE BLEND: Brand: GAMMEX

## (undated) DEVICE — SUT PROL 3-0 30IN NABSRB BLU 22MM SH-1 1/2

## (undated) DEVICE — DRAIN CHST SGL COLL 1 PT TB FOR ATS BG CMPTBL

## (undated) DEVICE — SUT PROL 5-0 24IN C-1 NABSRB BLU 13MM 3/8 CIR

## (undated) DEVICE — SUT ETHBND XL 0 30IN CT-1 NABSRB GRN 36MM 1/2

## (undated) DEVICE — HEART DRAPE AND SUPPLY: Brand: MEDLINE INDUSTRIES, INC.

## (undated) DEVICE — CS5/5+ FASTPACK, 225ML 150U RES: Brand: HAEMONETICS CELL SAVER 5/5+ SYSTEMS

## (undated) DEVICE — SUT MCRYL 4-0 18IN PS-2 ABSRB UD 19MM 3/8 CIR

## (undated) DEVICE — SUCTION CANISTER, 3000CC,SAFELINER: Brand: DEROYAL

## (undated) DEVICE — [HIGH FLOW INSUFFLATOR,  DO NOT USE IF PACKAGE IS DAMAGED,  KEEP DRY,  KEEP AWAY FROM SUNLIGHT,  PROTECT FROM HEAT AND RADIOACTIVE SOURCES.]: Brand: PNEUMOSURE

## (undated) DEVICE — CANNULA PERF PED AD 9FR L5.5IN AORT ROOT FLNG

## (undated) DEVICE — SUT PERMAHAND 1 L30IN N ABSRB BLK TIE SILK

## (undated) DEVICE — ADAPTER CRDPLG ANTGRD RTRGD 3W

## (undated) DEVICE — SUT PROL 4-0 36IN RB-1 NABSRB BLU 17MM 1/2 CI

## (undated) DEVICE — SUT PROL 6-0 18IN C-1 NABSRB BLU 13MM 3/8 CIR

## (undated) DEVICE — BNDG,ELSTC,MATRIX,STRL,4"X5YD,LF,HOOK&LP: Brand: MEDLINE

## (undated) DEVICE — A STERILE, SEMI-RIGID TUBE USED IN OPEN HEART SURGERY TO FACILITATE THE TRANSFER OF PRIMING FLUIDS DURING THE PRIMING OF THE EXTRACORPOREAL CIRCUIT OF A CARDIOPULMONARY BYPASS SYSTEM. IT IS A NONINVASIVE PRODUCT IN THE FORM OF A TUBE OR TUBING USED TO CHANNEL THE FLUIDS (I.E., PRIMER/BLOOD) TO FACILITATE PRIMING OF THE EXTRACORPOREAL CIRCUIT AND CONNECTION OF THE CARDIOPULMONARY BYPASS SYSTEM (HEART/LUNG MACHINE) TO THE PATIENT. IT IS TYPICALLY A MOULDED PLASTIC TUBE WITH HARD PLASTIC SPIKE AT THE DISTAL END THAT CONNECTS TO THE BAG OR CONTAINER OF PRIMER SOLUTION, A CONNECTOR AT THE PROXIMAL END, AND A CENTRAL PINCH CLAMP. THIS IS A SINGLE-USE DEVICE.: Brand: QUICKIE PRIME - 1/4" X 1/16"

## (undated) DEVICE — SET TRNQT SUR 12FR TWO 1 BLU 1 SNR DLP

## (undated) DEVICE — SOLUTION IV 1000ML 0.9% NACL PRESERVATIVE

## (undated) DEVICE — TRAY CATH FOLEY 350CC 16FR CATH BACTIGUARD SIL

## (undated) DEVICE — 3M™ BAIR HUGGER® UNDERBODY BLANKET, FULL ACCESS, 10 PER CASE 63500: Brand: BAIR HUGGER™

## (undated) DEVICE — PACK ASSRY CUSTOM TUBING

## (undated) DEVICE — SOLUTION IRRIG 1000ML 0.9% NACL USP BTL

## (undated) DEVICE — Device: Brand: VIRTUOSAPH PLUS WITH RADIAL INDICATION

## (undated) DEVICE — DEVICE BLWR/MSTR ACCUMIST ATCH

## (undated) DEVICE — LEAD PACE 475MM CHN A OR V MYOCARDIAL STEROID

## (undated) DEVICE — SUT PROL 6-0 24IN C-1 NABSRB BLU 13MM 3/8 CIR

## (undated) DEVICE — TOURNIQUET 12FR L7IN 3 CLR 1 SNR VENA CAVA

## (undated) DEVICE — DRESSING FOAM 4.25IN LEN 12.5IN W WND PD N

## (undated) DEVICE — PISTOL GRIP SKIN STAPLER: Brand: MULTIFIRE PREMIUM

## (undated) DEVICE — SUT MCRYL 3-0 27IN ABSRB UD L24MM PS-1

## (undated) NOTE — LETTER
[facility]  Administration of Anesthesia  I, Elias Shah agree to be cared for by a physician anesthesiologist alone and/or with a nurse anesthetist, who is specially trained to monitor me and give me medicine to put me to sleep or keep me comfortable during my procedure    I understand that my anesthesiologist and/or anesthetist is not an employee or agent of [facility] or [employer]. He or she works for [facility]    As the patient asking for anesthesia services, I agree to:  Allow the anesthesiologist (anesthesia doctor) to give me medicine and do additional procedures as necessary. Some examples are: Starting or using an “IV” to give me medicine, fluids or blood during my procedure, and having a breathing tube placed to help me breathe when I’m asleep (intubation). In the event that my heart stops working properly, I understand that my anesthesiologist will make every effort to sustain my life, unless otherwise directed by [facility] Do Not Resuscitate documents.  Tell my anesthesia doctor before my procedure:  If I am pregnant.  The last time that I ate or drank.  iii. All of the medicines I take (including prescriptions, herbal supplements, and pills I can buy without a prescription (including street drugs/illegal medications). Failure to inform my anesthesiologist about these medicines may increase my risk of anesthetic complications.  iv.If I am allergic to anything or have had a reaction to anesthesia before.  I understand how the anesthesia medicine will help me (benefits).  I understand that with any type of anesthesia medicine there are risks:  The most common risks are: nausea, vomiting, sore throat, muscle soreness, damage to my eyes, mouth, or teeth (from breathing tube placement).  Rare risks include: remembering what happened during my procedure, allergic reactions to medications, injury to my airway, heart, lungs, vision, nerves,  or muscles and in extremely rare instances death.  My doctor has explained to me other choices available to me for my care (alternatives).  Pregnant Patients (“epidural”):  I understand that the risks of having an epidural (medicine given into my back to help control pain during labor), include itching, low blood pressure, difficulty urinating, headache or slowing of the baby’s heart. Very rare risks include infection, bleeding, seizure, irregular heart rhythms and nerve injury.  Regional Anesthesia (“spinal”, “epidural”, & “nerve blocks”):  I understand that rare but potential complications include headache, bleeding, infection, seizure, irregular heart rhythms, and nerve injury.    _____________________________________________________________________________  Patient (or Representative) Signature/Relationship to Patient  Date   Time    _____________________________________________________________________________   Name (if used)    Language/Organization   Time    _____________________________________________________________________________  Nurse Anesthetist Signature     Date   Time  _____________________________________________________________________________  Anesthesiologist Signature     Date   Time  I have discussed the procedure and information above with the patient (or patient’s representative) and answered their questions. The patient or their representative has agreed to have anesthesia services.    _____________________________________________________________________________  Witness        Date   Time  I have verified that the signature is that of the patient or patient’s representative, and that it was signed before the procedure  Patient Name: Elias Shah     : 1965                 Printed: 2025 at 11:32 AM    Medical Record #: [id number]                                            Page 1 of 1  ----------ANESTHESIA CONSENT----------

## (undated) NOTE — LETTER
[facility]  [address], [place], [place]    Authorization for Surgical Operation and Procedure                               I hereby authorize Dr. Robbins, my physician and his/her assistants (if applicable), which may include medical students, residents, and/or fellows, to perform the following surgical operation/ procedure and administer such anesthesia as may be determined necessary by my physician: Coronary Artery Bypass Grafting: Possible Endoscopic Vein Cushing on Elias Shah   2.   I recognize that during the surgical operation/procedure, unforeseen conditions may necessitate additional or different procedures than those listed above.  I, therefore, further authorize and request that the above-named surgeon, assistants, or designees perform such procedures as are, in their judgment, necessary and desirable.    3.   My surgeon/physician has discussed prior to my surgery the potential benefits, risks and side effects of this procedure; the likelihood of achieving goals; and potential problems that might occur during recuperation.  They also discussed reasonable alternatives to the procedure, including risks, benefits, and side effects related to the alternatives and risks related to not receiving this procedure.  I have had all my questions answered and I acknowledge that no guarantee has been made as to the result that may be obtained.    4.   Should the need arise during my operation/procedure, which includes change of level of care prior to discharge, I also consent to the administration of blood and/or blood products.  Further, I understand that despite careful testing and screening of blood or blood products by collecting agencies, I may still be subject to ill effects as a result of receiving a blood transfusion and/or blood products.  The following are some, but not all, of the potential risks that can occur: fever and allergic reactions, hemolytic reactions, transmission  of diseases such as Hepatitis, AIDS and Cytomegalovirus (CMV) and fluid overload.  In the event that I wish to have an autologous transfusion of my own blood, or a directed donor transfusion, I will discuss this with my physician.  Check only if Refusing Blood or Blood Products  I understand refusal of blood or blood products as deemed necessary by my physician may have serious consequences to my condition to include possible death. I hereby assume responsibility for my refusal and release the hospital, its personnel, and my physicians from any responsibility for the consequences of my refusal.    o  Refuse   5.   I authorize the use of any specimen, organs, tissues, body parts or foreign objects that may be removed from my body during the operation/procedure for diagnosis, research or teaching purposes and their subsequent disposal by hospital authorities.  I also authorize the release of specimen test results and/or written reports to my treating physician on the hospital medical staff or other referring or consulting physicians involved in my care, at the discretion of the Pathologist or my treating physician.    6.   I consent to the photographing or videotaping of the operations or procedures to be performed, including appropriate portions of my body for medical, scientific, or educational purposes, provided my identity is not revealed by the pictures or by descriptive texts accompanying them.  If the procedure has been photographed/videotaped, the surgeon will obtain the original picture, image, videotape or CD.  The hospital will not be responsible for storage, release or maintenance of the picture, image, tape or CD.    7.   I consent to the presence of a  or observers in the operating room as deemed necessary by my physician or their designees.    8.   I recognize that in the event my procedure results in extended X-Ray/fluoroscopy time, I may develop a skin reaction.    9. If I have a Do  Not Attempt Resuscitation (DNAR) order in place, that status will be suspended while in the operating room, procedural suite, and during the recovery period unless otherwise explicitly stated by me (or a person authorized to consent on my behalf). The surgeon or my attending physician will determine when the applicable recovery period ends for purposes of reinstating the DNAR order.  10. Patients having a sterilization procedure: I understand that if the procedure is successful the results will be permanent and it will therefore be impossible for me to inseminate, conceive, or bear children.  I also understand that the procedure is intended to result in sterility, although the result has not been guaranteed.   11. I acknowledge that my physician has explained sedation/analgesia administration to me including the risk and benefits I consent to the administration of sedation/analgesia as may be necessary or desirable in the judgment of my physician.    I CERTIFY THAT I HAVE READ AND FULLY UNDERSTAND THE ABOVE CONSENT TO OPERATION and/or OTHER PROCEDURE.     ____________________________________  _________________________________        ______________________________  Signature of Patient    Signature of Responsible Person                Printed Name of Responsible Person                                      ____________________________________  _____________________________                ________________________________  Signature of Witness        Date  Time         Relationship to Patient    STATEMENT OF PHYSICIAN My signature below affirms that prior to the time of the procedure; I have explained to the patient and/or his/her legal representative, the risks and benefits involved in the proposed treatment and any reasonable alternative to the proposed treatment. I have also explained the risks and benefits involved in refusal of the proposed treatment and alternatives to the proposed treatment and have answered the  patient's questions. If I have a significant financial interest in a co-management agreement or a significant financial interest in any product or implant, or other significant relationship used in this procedure/surgery, I have disclosed this and had a discussion with my patient.     _____________________________________________________              _____________________________  (Signature of Physician)                                                                                         (Date)                                   (Time)  Patient Name: Elias Shah      : 1965      Printed: 2025     Medical Record #: [id number]                                      Page 1 of 1

## (undated) NOTE — ED AVS SNAPSHOT
Christy Prajapati   MRN: [id number]    Department:  [facility] Emergency Department   Date of Visit:  12/1/2017           Disclosure     Insurance plans vary and the physician(s) referred by the ER may not be covered by your plan.  Please contact CARE PHYSICIAN AT ONCE OR RETURN IMMEDIATELY TO THE EMERGENCY DEPARTMENT. If you have been prescribed any medication(s), please fill your prescription right away and begin taking the medication(s) as directed.   If you believe that any of the medications

## (undated) NOTE — LETTER
Hospital Discharge Documentation    From: [facility] Hospitalist's Office  Phone: [phone]    Patient discharged time/date: 2025  3:57 PM  Patient discharge disposition:  Home Health Care Services, discharged home with [facility].       Discharge Summary - D/C Summary        Discharge Summary signed by Don Alejandra MD at 2025  2:38 PM  Version 1 of 1      Author: Don Alejandra MD Service: Hospitalist Author Type: Physician    Filed: 2025  2:38 PM Date of Service: 2025  2:34 PM Status: Signed    : Don Alejandra MD (Physician)           [facility]  part of [facility]    Discharge Summary    Elias Shah Patient Status:  Inpatient    1965 MRN [id number]   Location [facility] 2W/SW Attending Don Alejandra MD   Hosp Day # 5 PCP Bobby Swift MD     Date of Admission: 2025 Disposition: Home or Self Care     Date of Discharge: 25    Admitting Diagnosis: Abnormal cardiac CT angiography [R93.1]    Hospital Discharge Diagnoses: CAD s/p CABG x 2, HTN, HL. Obesity    Lace+ Score: 25  59-90 High Risk  29-58 Medium Risk  0-28   Low Risk.    TCM Follow-Up Recommendation:  LACE < 29: Low Risk of readmission after discharge from the hospital; Still recommend for TCM follow-up.    Problem List: Problem List[1]    Reason for Admission: elective CABG    Physical Exam:   Vitals:    25 0500   BP: 140/76   Pulse: 82   Resp: 22   Temp:      Gen: AAOx4, resting comfortably  Pulm: Lungs clear, normal respiratory effort  CV: Heart with regular rate and rhythm  Abd: Abdomen soft, nontender, nondistended, bowel sounds present  Neuro: No acute focal deficits  MSK: moves extremities  Skin: Warm and dry  Psych: calm affect  Ext: no cyanosis    History of Present Illness:   Per Dr. Mark  Elias Shah is a(n) 59 year old male with a PMH of HTN, HL, JAMES who underwent ct coronary angio which showed moderate to  severe disease of the mid LAD with abnormal CT FFR and significant disease of the diagonal.  He was seen by cardiology in the office who recommended coronary cath.  Yesterday he had cardiac cath and it showed severe LAD disease and consult with CT surgery was recommended.  Patient was deemed a good candidate for CABG and he went to the OR this morning.  I am seeing him post op from CABG x2.  He is currently intubated and resting comfortably.     Hospital Course:   Acute CAD  - s/p CABG x2  - extubated successfully, doing well  - CT and hernandez removed   - plan asa, plavix, statin and beta blocker  - stable for dc     HTN  - stable, hold ARB on dc  - started on lasix     HL  - statin     Hx of etoh abuse  - was on CIWA  - counseled on cessation      Obesity  - BMI 37  - diet and exercise when stable    Consultations: cardiology, CV surgery, Pulm    Procedures: see above    Complications: none    Discharge Condition: Stable    Discharge Medications:      Discharge Medications        START taking these medications        Instructions Prescription details   ascorbic acid 500 MG Tabs  Commonly known as: Vitamin C      Take 1 tablet (500 mg total) by mouth 3 (three) times daily.   Stop taking on: June 26, 2025  Quantity: 90 tablet  Refills: 0     clopidogrel 75 MG Tabs  Commonly known as: Plavix  Start taking on: May 28, 2025      Take 1 tablet (75 mg total) by mouth daily.   Stop taking on: August 26, 2025  Quantity: 90 tablet  Refills: 0     famotidine 20 MG Tabs  Commonly known as: Pepcid      Take 1 tablet (20 mg total) by mouth 2 (two) times daily.   Stop taking on: June 26, 2025  Quantity: 60 tablet  Refills: 0     furosemide 20 MG Tabs  Commonly known as: Lasix      Take 1 tablet (20 mg total) by mouth 2 (two) times daily for 14 days.   Stop taking on: Anabela 10, 2025  Quantity: 28 tablet  Refills: 0     HYDROcodone-acetaminophen 5-325 MG Tabs  Commonly known as: Norco      Take 1 tablet by mouth every 4 (four) hours  as needed.   Quantity: 20 tablet  Refills: 0     multivitamin Tabs  Start taking on: May 28, 2025      Take 1 tablet by mouth daily.   Stop taking on: June 27, 2025  Quantity: 30 tablet  Refills: 0     potassium chloride 10 MEQ Tbcr  Commonly known as: Klor-Con M10      Take 1 tablet (10 mEq total) by mouth 2 (two) times daily for 14 days.   Stop taking on: Anabela 10, 2025  Quantity: 28 tablet  Refills: 0            CONTINUE taking these medications        Instructions Prescription details   albuterol 108 (90 Base) MCG/ACT Aers  Commonly known as: Ventolin HFA      Inhale 2 puffs into the lungs every 6 (six) hours as needed.   Quantity: 1 each  Refills: 1     aspirin 81 MG Tbec  Commonly known as: Aspir-Low      Take 1 tablet (81 mg total) by mouth daily.   Quantity: 30 tablet  Refills: 11     atorvastatin 40 MG Tabs  Commonly known as: Lipitor      TAKE 1 TABLET BY MOUTH EVERY DAY   Quantity: 90 tablet  Refills: 3     cyclobenzaprine 5 MG Tabs  Commonly known as: Flexeril      Take 1 tablet (5 mg total) by mouth 3 (three) times daily as needed for Muscle spasms.   Quantity: 30 tablet  Refills: 0     fluocinolone 0.01 % Crea  Commonly known as: Synalar      1 thin application to the affected areas twice a day as needed.   Quantity: 60 g  Refills: 0     levothyroxine 25 MCG Tabs  Commonly known as: Synthroid      Take 1 tablet (25 mcg total) by mouth before breakfast.   Quantity: 90 tablet  Refills: 3     triamcinolone 0.1 % Crea  Commonly known as: Kenalog      Apply topically 2 (two) times daily as needed.   Quantity: 60 g  Refills: 3            STOP taking these medications      diclofenac 50 MG Tbec  Commonly known as: Voltaren        losartan 25 MG Tabs  Commonly known as: Cozaar                  Where to Get Your Medications        These medications were sent to [facility]/pharmacy #[id number] - [place], [place] - [address] AT [facility], [phone], [phone]  [address], [place] [address]       Phone: [phone]   ascorbic acid 500 MG Tabs  clopidogrel 75 MG Tabs  famotidine 20 MG Tabs  furosemide 20 MG Tabs  HYDROcodone-acetaminophen 5-325 MG Tabs  multivitamin Tabs  potassium chloride 10 MEQ Tbcr         Greater than 35 minutes spent, >50% spent counseling re: treatment plan and workup     Don Alejandra MD  5/27/2025  a       [1]   Patient Active Problem List  Diagnosis    Benign tumor of parotid gland    Cervical radiculopathy    Hyperlipidemia LDL goal <100    Hip pain    Right inguinal pain    Elevated blood pressure reading    Agatston coronary artery calcium score greater than 400    HTN (hypertension), benign    Abnormal cardiac CT angiography       Electronically signed by Don Alejandra MD on 5/27/2025  2:38 PM

## (undated) NOTE — LETTER
[facility] CV SURG[facility]  [address]  [facility] [address]  [phone]    Blood Transfusion Consent    In the course of your treatment, it may become necessary to administer a transfusion of blood or blood components. This form provides basic information concerning this procedure and, if signed by you, authorizes its administration. By signing this form, you agree that all of your questions about the administration of blood or blood products have been answered by the ordering medical professional or designee.    Description of Procedure  Blood is introduced into one of your veins, commonly in the arm, using a sterilized disposable needle. The amount of blood transfused, and whether the transfusion will be of blood or blood components is a judgement the physician will make based on your particular needs.    Risks  The transfusion is a common procedure of low risk.  MINOR AND TEMPORARY REACTIONS ARE NOT UNCOMMON, including a slight bruise, swelling or local reaction in the area where the needle pierces your skin, or a nonserious reaction to the transfused material itself, including headache, fever or mild skin reaction, such as rash.  Serious reactions are possible, though very unlikely, and include severe allergic reaction (shock) and destruction (hemolysis) of transfused blood cells.  Infectious diseases which are known to be transmitted by blood transfusion include certain types of viral Hepatitis(liver infection from a virus), Human Immunodeficiency Virus (HIV-1,2) infection, a viral infection known to cause Acquired Immunodeficiency Syndrome (AIDS), as well as certain other bacterial, viral, and parasitic diseases. While a minimal risk of acquiring an infectious disease from transfused blood exists, in accordance with the Federal and State law, all due care has been taken in donor selection and testing to avoid transmission of disease.    Alternatives  If loss of blood poses serious threats during  your treatment, THERE IS NO EFFECTIVE ALTERNATIVE TO BLOOD TRANSFUSION. However, if you have any further questions on this matter, your provider will fully explain the alternatives to you if it has not already been done.    I, ______________________________, have read/had read to me the above. I understand the matters bearing on the decision whether or not to authorize a transfusion of blood or blood components. I have no questions which have not been answered to my full satisfaction. I hereby consent to such transfusion as my physician may deem necessary or advisable in the course of my treatment.    ______________________________________________                    ___________________________  (Signature of Patient or Responsible party in case of minor,                 (Printed Name of Patient or incompetent, or unconscious patient)              Responsible Party)    ___________________________               _____________________  (Relationship to Patient if not self)                                    (Date and Time)    __________________________                                                           ______________________              (Signature of Witness)               (Printed Name of Witness)     Language line ()    Telephone/Verbal/Video Consent    __________________________                     ____________________  (Signature of 2nd Witness           (Printed Name of 2nd  Telephone/Verbal/Video Consent)           Witness)    Patient Name: Elias Shah     : 1965                 Printed: May 22, 2025     Medical Record #: [id number]      Rev: 2023